# Patient Record
Sex: FEMALE | Race: WHITE | NOT HISPANIC OR LATINO | Employment: UNEMPLOYED | URBAN - METROPOLITAN AREA
[De-identification: names, ages, dates, MRNs, and addresses within clinical notes are randomized per-mention and may not be internally consistent; named-entity substitution may affect disease eponyms.]

---

## 2017-01-03 DIAGNOSIS — M79.673 PAIN OF FOOT, UNSPECIFIED LATERALITY: Primary | ICD-10-CM

## 2017-01-05 ENCOUNTER — OFFICE VISIT (OUTPATIENT)
Dept: ORTHOPEDICS | Facility: CLINIC | Age: 62
End: 2017-01-05
Payer: COMMERCIAL

## 2017-01-05 ENCOUNTER — HOSPITAL ENCOUNTER (OUTPATIENT)
Dept: RADIOLOGY | Facility: HOSPITAL | Age: 62
Discharge: HOME | End: 2017-01-05
Attending: ORTHOPAEDIC SURGERY
Payer: COMMERCIAL

## 2017-01-05 VITALS
WEIGHT: 164 LBS | SYSTOLIC BLOOD PRESSURE: 152 MMHG | BODY MASS INDEX: 30.18 KG/M2 | HEIGHT: 62 IN | HEART RATE: 79 BPM | DIASTOLIC BLOOD PRESSURE: 73 MMHG

## 2017-01-05 DIAGNOSIS — M20.11 HALLUX VALGUS, RIGHT: Primary | ICD-10-CM

## 2017-01-05 DIAGNOSIS — M79.673 PAIN OF FOOT, UNSPECIFIED LATERALITY: ICD-10-CM

## 2017-01-05 PROCEDURE — 99999 PR PBB SHADOW E&M-EST. PATIENT-LVL III: CPT | Mod: PBBFAC,,, | Performed by: ORTHOPAEDIC SURGERY

## 2017-01-05 PROCEDURE — 99214 OFFICE O/P EST MOD 30 MIN: CPT | Mod: S$GLB,,, | Performed by: ORTHOPAEDIC SURGERY

## 2017-01-05 PROCEDURE — 1159F MED LIST DOCD IN RCRD: CPT | Mod: S$GLB,,, | Performed by: ORTHOPAEDIC SURGERY

## 2017-01-05 PROCEDURE — 73630 X-RAY EXAM OF FOOT: CPT | Mod: 26,RT,, | Performed by: RADIOLOGY

## 2017-01-05 PROCEDURE — 73630 X-RAY EXAM OF FOOT: CPT | Mod: TC,PN,RT

## 2017-01-06 ENCOUNTER — PATIENT MESSAGE (OUTPATIENT)
Dept: FAMILY MEDICINE | Facility: CLINIC | Age: 62
End: 2017-01-06

## 2017-01-06 RX ORDER — PHENTERMINE HYDROCHLORIDE 37.5 MG/1
1 TABLET ORAL DAILY
COMMUNITY
Start: 2017-01-02 | End: 2019-06-14 | Stop reason: ALTCHOICE

## 2017-01-06 NOTE — PROGRESS NOTES
Past Medical History   Diagnosis Date    Allergy     Arthritis     GERD (gastroesophageal reflux disease)     Ulcer        Past Surgical History   Procedure Laterality Date    Hysterectomy      Knee surgery      Tonsillectomy, adenoidectomy, bilateral myringotomy and tubes      Cholecystectomy      Eye surgery      Tubal ligation         Current Outpatient Prescriptions   Medication Sig    ondansetron (ZOFRAN-ODT) 4 MG TbDL Take 1 tablet (4 mg total) by mouth every 8 (eight) hours as needed.     Current Facility-Administered Medications   Medication    dexamethasone (DECADRON) injection 4 mg    triamcinolone acetonide (KENALOG-40) injection 40 mg       Review of patient's allergies indicates:   Allergen Reactions    Amoxicillin Nausea Only     Cannot tolerate side effects    Erythromycin Nausea Only     Other reaction(s): Nausea    Oxycodone      Other reaction(s): Swelling  Other reaction(s): Flushing (skin)    Phenergan [promethazine] Other (See Comments)     Other reaction(s): Muscle pain  Muscle pain    Imitrex [sumatriptan succinate] Swelling and Palpitations     Other reaction(s): swelling  Other reaction(s): chest pains       History reviewed. No pertinent family history.    Social History     Social History    Marital status:      Spouse name: N/A    Number of children: N/A    Years of education: N/A     Occupational History    Not on file.     Social History Main Topics    Smoking status: Never Smoker    Smokeless tobacco: Never Used    Alcohol use No    Drug use: No    Sexual activity: Not on file     Other Topics Concern    Not on file     Social History Narrative       Chief Complaint:   Chief Complaint   Patient presents with    Foot Pain     right       History of present illness: Is a 61-year-old female patient of mine who comes in with a new complaint of right foot pain.  Patient denies injury or trauma.  She's been having pain around the great toe now for a few  weeks.  Does admit to being on her feet a lot recently.  Unfortunately, her mother recently passed away and she was standing up in the ICU for several weeks.  Pain as a 1 out of 10.  Pain when walking.  Pain on top of her foot and around the MTP joint of the great toe.  No previous history of foot pain.  No previous treatment.      Answers for HPI/ROS submitted by the patient on 1/2/2017   Leg pain  unexpected weight change: No  appetite change : No  sleep disturbance: No  IMMUNOCOMPROMISED: No  nervous/ anxious: No  dysphoric mood: No  rash: No  visual disturbance: No  eye redness: No  eye pain: No  ear pain: No  tinnitus: No  hearing loss: No  sinus pressure : No  nosebleeds: No  enviro allergies: Yes  food allergies: No  cough: No  shortness of breath: No  sweating: No  dysuria: No  frequency: No  difficulty urinating: No  hematuria: No  painful intercourse: No  chest pain: No  palpitations: No  nausea: No  vomiting: No  diarrhea: No  blood in stool: No  constipation: No  headaches: No  dizziness: No  numbness: Yes  seizures: No  joint swelling: No  myalgia: No  weakness: No  back pain: No  Pain Chronicity: recurrent  History of trauma: No  Onset: 1 to 4 weeks ago  Frequency: daily  Progression since onset: gradually worsening  Injury mechanism: jumping  injury location: at home  pain- numeric: 3/10  pain location: right foot  pain quality: sharp, burning  Radiating Pain: No  Aggravating factors: walking  fever: No  inability to bear weight: Yes  itching: No  joint locking: No  limited range of motion: No  stiffness: No  tingling: No  Treatments tried: cold, heat, exercise, NSAIDs, rest, other  physical therapy: ineffective  Improvement on treatment: no relief        Physical Examination:    Vital Signs:    Vitals:    01/05/17 1005   BP: (!) 152/73   Pulse: 79       Body mass index is 30 kg/(m^2).    This a well-developed, well nourished patient in no acute distress.  They are alert and oriented and cooperative  to examination.  Pt. walks without an antalgic gait.      Examination of the patient's right foot and ankle shows no signs of rashes or erythema. The patient has no ecchymosis or effusion or masses. The patient has a negative anterior drawer and talar tilting exam. The patient has full range of motion of ankle dorsiflexion, plantarflexion, inversion, and eversion. Patient has 5 out of 5 motor strength in all muscle groups. Patient has 2+ dorsalis pedis pulses and intact light touch sensation. The patient is nontender over both medial ankle ligaments and medial malleolus as well as lateral ankle ligaments and the lateral malleolus.  Patient does have pain of the MTP joint.  Mild bunion deformity.    Examination of the patient's left foot and ankle shows no signs of rashes or erythema. The patient has no ecchymosis or effusion or masses. The patient has a negative anterior drawer and talar tilting exam. The patient has full range of motion of ankle dorsiflexion, plantarflexion, inversion, and eversion. Patient has 5 out of 5 motor strength in all muscle groups. Patient has 2+ dorsalis pedis pulses and intact light touch sensation. The patient is nontender over both medial ankle ligaments and medial malleolus as well as lateral ankle ligaments and the lateral malleolus. Negative squeeze test.        X-rays: 3 views of the right foot are ordered and reviewed which show mild hallux valgus formation.  Achilles bone spur.     Assessment:: Right hallux valgus    Plan:  I reviewed the findings and the x-rays with her today.  We discussed treatment options.  Patient has a very mild hallux valgus to just recently became symptomatic.  I recommended some inserts for her shoes including cushioning under the first MTP joint particularly.  Follow-up as needed.    This note was created using Dragon voice recognition software that occasionally misinterpreted phrases or words.    Consult note is delivered via Epic messaging  service.

## 2017-01-10 ENCOUNTER — PATIENT MESSAGE (OUTPATIENT)
Dept: ORTHOPEDICS | Facility: CLINIC | Age: 62
End: 2017-01-10

## 2017-01-12 ENCOUNTER — OFFICE VISIT (OUTPATIENT)
Dept: ORTHOPEDICS | Facility: CLINIC | Age: 62
End: 2017-01-12
Payer: COMMERCIAL

## 2017-01-12 ENCOUNTER — HOSPITAL ENCOUNTER (OUTPATIENT)
Dept: RADIOLOGY | Facility: HOSPITAL | Age: 62
Discharge: HOME | End: 2017-01-12
Attending: ORTHOPAEDIC SURGERY
Payer: COMMERCIAL

## 2017-01-12 VITALS
SYSTOLIC BLOOD PRESSURE: 142 MMHG | HEIGHT: 62 IN | BODY MASS INDEX: 30.18 KG/M2 | DIASTOLIC BLOOD PRESSURE: 73 MMHG | WEIGHT: 164 LBS | HEART RATE: 81 BPM

## 2017-01-12 DIAGNOSIS — S93.602A FOOT SPRAIN, LEFT, INITIAL ENCOUNTER: Primary | ICD-10-CM

## 2017-01-12 DIAGNOSIS — M79.672 LEFT FOOT PAIN: Primary | ICD-10-CM

## 2017-01-12 DIAGNOSIS — M79.672 LEFT FOOT PAIN: ICD-10-CM

## 2017-01-12 PROCEDURE — 73630 X-RAY EXAM OF FOOT: CPT | Mod: 26,LT,, | Performed by: RADIOLOGY

## 2017-01-12 PROCEDURE — 99999 PR PBB SHADOW E&M-EST. PATIENT-LVL III: CPT | Mod: PBBFAC,,, | Performed by: ORTHOPAEDIC SURGERY

## 2017-01-12 PROCEDURE — 1159F MED LIST DOCD IN RCRD: CPT | Mod: S$GLB,,, | Performed by: ORTHOPAEDIC SURGERY

## 2017-01-12 PROCEDURE — 73630 X-RAY EXAM OF FOOT: CPT | Mod: TC,PN,LT

## 2017-01-12 PROCEDURE — 99214 OFFICE O/P EST MOD 30 MIN: CPT | Mod: S$GLB,,, | Performed by: ORTHOPAEDIC SURGERY

## 2017-01-12 NOTE — PROGRESS NOTES
Past Medical History   Diagnosis Date    Allergy     Arthritis     GERD (gastroesophageal reflux disease)     Ulcer        Past Surgical History   Procedure Laterality Date    Hysterectomy      Knee surgery      Tonsillectomy, adenoidectomy, bilateral myringotomy and tubes      Cholecystectomy      Eye surgery      Tubal ligation         Current Outpatient Prescriptions   Medication Sig    ondansetron (ZOFRAN-ODT) 4 MG TbDL Take 1 tablet (4 mg total) by mouth every 8 (eight) hours as needed.    phentermine (ADIPEX-P) 37.5 mg tablet Take 1 tablet by mouth once daily.     Current Facility-Administered Medications   Medication    dexamethasone (DECADRON) injection 4 mg    triamcinolone acetonide (KENALOG-40) injection 40 mg       Review of patient's allergies indicates:   Allergen Reactions    Amoxicillin Nausea Only     Cannot tolerate side effects    Erythromycin Nausea Only     Other reaction(s): Nausea    Oxycodone      Other reaction(s): Swelling  Other reaction(s): Flushing (skin)    Phenergan [promethazine] Other (See Comments)     Other reaction(s): Muscle pain  Muscle pain    Imitrex [sumatriptan succinate] Swelling and Palpitations     Other reaction(s): swelling  Other reaction(s): chest pains       History reviewed. No pertinent family history.    Social History     Social History    Marital status:      Spouse name: N/A    Number of children: N/A    Years of education: N/A     Occupational History    Not on file.     Social History Main Topics    Smoking status: Never Smoker    Smokeless tobacco: Never Used    Alcohol use No    Drug use: No    Sexual activity: Not on file     Other Topics Concern    Not on file     Social History Narrative       Chief Complaint:   Chief Complaint   Patient presents with    Foot Pain     left foot pain        History of present illness: Is a 61-year-old female patient of mine who comes in with a new complaint of left foot pain.  Patient  denies injury or trauma.  Patient stepped in a hole on January 9, 2017.  She now has pain over the dorsal lateral midfoot.  Pain with walking and standing.  Pain of 6 out of 10.    Physical Examination:    Vital Signs:    Vitals:    01/12/17 0822   BP: (!) 142/73   Pulse: 81       Body mass index is 30 kg/(m^2).    This a well-developed, well nourished patient in no acute distress.  They are alert and oriented and cooperative to examination.  Pt. walks without an antalgic gait.        Examination of the patient's left foot and ankle shows no signs of rashes or erythema. The patient has no ecchymosis or effusion or masses. The patient has a negative anterior drawer and talar tilting exam.  Tender to palpation over the dorsal midfoot in the area of the calcaneocuboid junction The patient has full range of motion of ankle dorsiflexion, plantarflexion, inversion, and eversion. Patient has 5 out of 5 motor strength in all muscle groups. Patient has 2+ dorsalis pedis pulses and intact light touch sensation. The patient is nontender over both medial ankle ligaments and medial malleolus as well  lateral ankle ligaments and the lateral malleolus. Negative squeeze test.        X-rays: 3 views of the left foot are ordered and reviewed which show no acute fracture     Assessment:: Left foot sprain    Plan:  I reviewed the findings and the x-rays with her today.  We discussed treatment options.  I recommended a postop shoe.  She may wean out of this as her pain improves.    This note was created using Dragon voice recognition software that occasionally misinterpreted phrases or words.    Consult note is delivered via Epic messaging service.    Answers for HPI/ROS submitted by the patient on 1/10/2017   Leg pain  unexpected weight change: No  appetite change : No  sleep disturbance: No  IMMUNOCOMPROMISED: No  nervous/ anxious: No  dysphoric mood: No  rash: No  visual disturbance: No  eye redness: No  eye pain: No  ear pain:  No  tinnitus: No  hearing loss: No  sinus pressure : No  nosebleeds: No  enviro allergies: Yes  food allergies: No  cough: No  shortness of breath: No  sweating: No  dysuria: No  frequency: No  difficulty urinating: No  hematuria: No  painful intercourse: No  chest pain: No  palpitations: No  nausea: No  vomiting: No  diarrhea: No  blood in stool: No  constipation: No  headaches: No  dizziness: No  numbness: No  seizures: No  joint swelling: No  myalgia: Yes  weakness: No  back pain: No  Pain Chronicity: new  History of trauma: No  Onset: yesterday  Frequency: constantly  Progression since onset: gradually worsening  Injury mechanism: twisting  injury location: at home  pain- numeric: 8/10  pain location: left foot  pain quality: aching, sharp  Radiating Pain: No  Aggravating factors: bearing weight, extension, standing, flexion, walking, rotation, touching  fever: No  inability to bear weight: Yes  itching: No  joint locking: No  limited range of motion: Yes  stiffness: No  tingling: No  Treatments tried: cold, heat, OTC pain meds, rest  physical therapy: ineffective  Improvement on treatment: no relief

## 2017-05-11 ENCOUNTER — PATIENT MESSAGE (OUTPATIENT)
Dept: ORTHOPEDICS | Facility: CLINIC | Age: 62
End: 2017-05-11

## 2017-05-12 DIAGNOSIS — M25.561 RIGHT KNEE PAIN, UNSPECIFIED CHRONICITY: Primary | ICD-10-CM

## 2017-05-15 ENCOUNTER — OFFICE VISIT (OUTPATIENT)
Dept: ORTHOPEDICS | Facility: CLINIC | Age: 62
End: 2017-05-15
Payer: COMMERCIAL

## 2017-05-15 ENCOUNTER — HOSPITAL ENCOUNTER (OUTPATIENT)
Dept: RADIOLOGY | Facility: HOSPITAL | Age: 62
Discharge: HOME | End: 2017-05-15
Attending: ORTHOPAEDIC SURGERY
Payer: COMMERCIAL

## 2017-05-15 VITALS
HEIGHT: 62 IN | BODY MASS INDEX: 30.18 KG/M2 | DIASTOLIC BLOOD PRESSURE: 71 MMHG | SYSTOLIC BLOOD PRESSURE: 142 MMHG | WEIGHT: 164 LBS | HEART RATE: 77 BPM

## 2017-05-15 DIAGNOSIS — S83.241A ACUTE MEDIAL MENISCAL TEAR, RIGHT, INITIAL ENCOUNTER: Primary | ICD-10-CM

## 2017-05-15 DIAGNOSIS — M25.561 RIGHT KNEE PAIN, UNSPECIFIED CHRONICITY: ICD-10-CM

## 2017-05-15 PROCEDURE — 99999 PR PBB SHADOW E&M-EST. PATIENT-LVL III: CPT | Mod: PBBFAC,,, | Performed by: ORTHOPAEDIC SURGERY

## 2017-05-15 PROCEDURE — 73564 X-RAY EXAM KNEE 4 OR MORE: CPT | Mod: TC,PN,RT

## 2017-05-15 PROCEDURE — 73564 X-RAY EXAM KNEE 4 OR MORE: CPT | Mod: 26,RT,, | Performed by: RADIOLOGY

## 2017-05-15 PROCEDURE — 99214 OFFICE O/P EST MOD 30 MIN: CPT | Mod: S$GLB,,, | Performed by: ORTHOPAEDIC SURGERY

## 2017-05-15 PROCEDURE — 1160F RVW MEDS BY RX/DR IN RCRD: CPT | Mod: S$GLB,,, | Performed by: ORTHOPAEDIC SURGERY

## 2017-05-15 PROCEDURE — 73562 X-RAY EXAM OF KNEE 3: CPT | Mod: 26,LT,, | Performed by: RADIOLOGY

## 2017-05-15 NOTE — PROGRESS NOTES
Past Medical History:   Diagnosis Date    Allergy     Arthritis     GERD (gastroesophageal reflux disease)     Ulcer        Past Surgical History:   Procedure Laterality Date    CHOLECYSTECTOMY      EYE SURGERY      HYSTERECTOMY      KNEE SURGERY      TONSILLECTOMY, ADENOIDECTOMY, BILATERAL MYRINGOTOMY AND TUBES      TUBAL LIGATION         Current Outpatient Prescriptions   Medication Sig    ondansetron (ZOFRAN-ODT) 4 MG TbDL Take 1 tablet (4 mg total) by mouth every 8 (eight) hours as needed.    phentermine (ADIPEX-P) 37.5 mg tablet Take 1 tablet by mouth once daily.     Current Facility-Administered Medications   Medication    dexamethasone (DECADRON) injection 4 mg    triamcinolone acetonide (KENALOG-40) injection 40 mg       Review of patient's allergies indicates:   Allergen Reactions    Amoxicillin Nausea Only     Cannot tolerate side effects    Erythromycin Nausea Only     Other reaction(s): Nausea    Oxycodone      Other reaction(s): Swelling  Other reaction(s): Flushing (skin)    Phenergan [promethazine] Other (See Comments)     Other reaction(s): Muscle pain  Muscle pain    Imitrex [sumatriptan succinate] Swelling and Palpitations     Other reaction(s): swelling  Other reaction(s): chest pains       History reviewed. No pertinent family history.    Social History     Social History    Marital status:      Spouse name: N/A    Number of children: N/A    Years of education: N/A     Occupational History    Not on file.     Social History Main Topics    Smoking status: Never Smoker    Smokeless tobacco: Never Used    Alcohol use No    Drug use: No    Sexual activity: Not on file     Other Topics Concern    Not on file     Social History Narrative       Chief Complaint:   Chief Complaint   Patient presents with    Right Knee - Pain       History of present illness: This is a 61-year-old patient of mine who comes with a new complaint of right knee pain.  Pain started in early  May.  There is no injury or trauma.  Pain with walking.  Pain is in the front and medial aspect of her knee.  Her knee completely liliana at times.  She feels popping and catching along the posterior medial knee.  She does have a history of meniscal tear on the left knee that required arthroscopic surgery several years prior.  Pain today as a 0 out of 10 but up to an 8 out of 10 after her giving way incidents.    Answers for HPI/ROS submitted by the patient on 5/12/2017   Leg pain  unexpected weight change: No  appetite change : No  sleep disturbance: No  IMMUNOCOMPROMISED: No  nervous/ anxious: No  dysphoric mood: No  rash: No  visual disturbance: No  eye redness: No  eye pain: No  ear pain: No  tinnitus: No  hearing loss: No  sinus pressure : No  nosebleeds: No  enviro allergies: No  food allergies: No  cough: No  shortness of breath: No  sweating: No  dysuria: No  frequency: No  difficulty urinating: No  hematuria: No  painful intercourse: No  chest pain: No  palpitations: No  nausea: No  vomiting: No  diarrhea: No  blood in stool: No  constipation: No  headaches: No  dizziness: No  numbness: No  seizures: No  joint swelling: No  myalgia: No  weakness: No  back pain: No  Pain Chronicity: new  History of trauma: No  Onset: 1 to 4 weeks ago  Frequency: daily  Progression since onset: gradually worsening  injury location: at home  pain- numeric: 8/10  pain location: right knee  pain quality: sharp, tight  Radiating Pain: No  Aggravating factors: walking  fever: No  inability to bear weight: Yes  itching: No  joint locking: No  limited range of motion: No  stiffness: No  tingling: No  Treatments tried: brace/corset, cold, heat, exercise, movement, rest  physical therapy: ineffective  Improvement on treatment: no relief        Physical Examination:    Vital Signs:    Vitals:    05/15/17 0845   BP: (!) 142/71   Pulse: 77       Body mass index is 30 kg/(m^2).    This a well-developed, well nourished patient in no acute  distress.  They are alert and oriented and cooperative to examination.  Pt. walks without an antalgic gait.      Examination of the right knee shows no rashes or erythema. There are no masses ecchymosis or effusion. Patient has full range of motion from 0-130°. Patient is nontender to palpation over lateral joint line and moderately tender to palpation over the medial joint line. Patient has a - Lachman exam, - anterior drawer exam, and - posterior drawer exam.  Positive medial Apley exam. Knee is stable to varus and valgus stress. 5 out of 5 motor strength. Palpable distal pulses. Intact light touch sensation. Negative Patellofemoral crepitus    Examination of the left knee shows no rashes or erythema. There are no masses ecchymosis or effusion. Patient has full range of motion from 0-130°. Patient is nontender to palpation over lateral joint line and nontender to palpation over the medial joint line. Patient has a - Lachman exam, - anterior drawer exam, and - posterior drawer exam. - Davian's exam. Knee is stable to varus and valgus stress. 5 out of 5 motor strength. Palpable distal pulses. Intact light touch sensation. Negative Patellofemoral crepitus    X-rays: X-rays of the right knee are ordered and reviewed which show very minimal medial joint space narrowing     Assessment:: Right medial meniscal tear    Plan:  I recommended an MRI to evaluate for possible bucket-handle tear of the medial meniscus.  Patient has joint line symptoms as well as mechanical symptoms and giving way of her knee.  Follow-up after the MRI is completed.    This note was created using Dragon voice recognition software that occasionally misinterpreted phrases or words.    Consult note is delivered via Epic messaging service.

## 2017-05-16 ENCOUNTER — HOSPITAL ENCOUNTER (OUTPATIENT)
Dept: RADIOLOGY | Facility: HOSPITAL | Age: 62
Discharge: HOME | End: 2017-05-16
Attending: ORTHOPAEDIC SURGERY
Payer: COMMERCIAL

## 2017-05-16 DIAGNOSIS — S83.241A ACUTE MEDIAL MENISCAL TEAR, RIGHT, INITIAL ENCOUNTER: ICD-10-CM

## 2017-05-16 PROCEDURE — 73721 MRI JNT OF LWR EXTRE W/O DYE: CPT | Mod: TC,RT

## 2017-05-16 PROCEDURE — 73721 MRI JNT OF LWR EXTRE W/O DYE: CPT | Mod: 26,RT,, | Performed by: RADIOLOGY

## 2017-05-22 ENCOUNTER — OFFICE VISIT (OUTPATIENT)
Dept: ORTHOPEDICS | Facility: CLINIC | Age: 62
End: 2017-05-22
Payer: COMMERCIAL

## 2017-05-22 VITALS
DIASTOLIC BLOOD PRESSURE: 80 MMHG | HEIGHT: 62 IN | WEIGHT: 149 LBS | BODY MASS INDEX: 27.42 KG/M2 | HEART RATE: 81 BPM | SYSTOLIC BLOOD PRESSURE: 143 MMHG

## 2017-05-22 DIAGNOSIS — M23.91 CHONDRAL DEFECT OF RIGHT TIBIAL PLATEAU: Primary | ICD-10-CM

## 2017-05-22 PROCEDURE — 99999 PR PBB SHADOW E&M-EST. PATIENT-LVL III: CPT | Mod: PBBFAC,,, | Performed by: ORTHOPAEDIC SURGERY

## 2017-05-22 PROCEDURE — 1160F RVW MEDS BY RX/DR IN RCRD: CPT | Mod: S$GLB,,, | Performed by: ORTHOPAEDIC SURGERY

## 2017-05-22 PROCEDURE — 99213 OFFICE O/P EST LOW 20 MIN: CPT | Mod: S$GLB,,, | Performed by: ORTHOPAEDIC SURGERY

## 2017-05-22 NOTE — PROGRESS NOTES
Past Medical History:   Diagnosis Date    Allergy     Arthritis     GERD (gastroesophageal reflux disease)     Ulcer        Past Surgical History:   Procedure Laterality Date    CHOLECYSTECTOMY      EYE SURGERY      HYSTERECTOMY      KNEE SURGERY      TONSILLECTOMY, ADENOIDECTOMY, BILATERAL MYRINGOTOMY AND TUBES      TUBAL LIGATION         Current Outpatient Prescriptions   Medication Sig    ondansetron (ZOFRAN-ODT) 4 MG TbDL Take 1 tablet (4 mg total) by mouth every 8 (eight) hours as needed.    phentermine (ADIPEX-P) 37.5 mg tablet Take 1 tablet by mouth once daily.     Current Facility-Administered Medications   Medication    dexamethasone (DECADRON) injection 4 mg    triamcinolone acetonide (KENALOG-40) injection 40 mg       Review of patient's allergies indicates:   Allergen Reactions    Amoxicillin Nausea Only     Cannot tolerate side effects    Erythromycin Nausea Only     Other reaction(s): Nausea    Oxycodone      Other reaction(s): Swelling  Other reaction(s): Flushing (skin)    Phenergan [promethazine] Other (See Comments)     Other reaction(s): Muscle pain  Muscle pain    Imitrex [sumatriptan succinate] Swelling and Palpitations     Other reaction(s): swelling  Other reaction(s): chest pains       History reviewed. No pertinent family history.    Social History     Social History    Marital status:      Spouse name: N/A    Number of children: N/A    Years of education: N/A     Occupational History    Not on file.     Social History Main Topics    Smoking status: Never Smoker    Smokeless tobacco: Never Used    Alcohol use No    Drug use: No    Sexual activity: Not on file     Other Topics Concern    Not on file     Social History Narrative    No narrative on file       Chief Complaint:   No chief complaint on file.      History of present illness: This is a 61-year-old patient of mine who comes with a new complaint of right knee pain.  Pain started in early May.   There is no injury or trauma.  Pain with walking.  Pain is in the front and medial aspect of her knee.  Her knee completely liliana at times.  She feels popping and catching along the posterior medial knee.  She does have a history of meniscal tear on the left knee that required arthroscopic surgery several years prior.  Pain today as a 0 out of 10 but up to an 8 out of 10 after her giving way incidents.  MRI did show chondral defect on the medial surface of the tibia with some corresponding edema.    Answers for HPI/ROS submitted by the patient on 5/19/2017   Leg pain  unexpected weight change: No  appetite change : No  sleep disturbance: No  IMMUNOCOMPROMISED: No  nervous/ anxious: No  dysphoric mood: No  rash: No  visual disturbance: No  eye redness: No  eye pain: No  ear pain: No  tinnitus: No  hearing loss: No  sinus pressure : No  nosebleeds: No  enviro allergies: No  food allergies: No  cough: No  shortness of breath: No  sweating: No  dysuria: No  frequency: No  difficulty urinating: No  hematuria: No  painful intercourse: No  chest pain: No  palpitations: No  nausea: No  vomiting: No  diarrhea: No  blood in stool: No  constipation: No  headaches: No  dizziness: No  numbness: No  seizures: No  joint swelling: No  myalgia: No  weakness: No  back pain: No  Pain Chronicity: new  History of trauma: No  Onset: 1 to 4 weeks ago  Frequency: intermittently  Progression since onset: gradually improving  injury location: at home  pain- numeric: 0/10  pain location: right knee  pain quality: sharp, tight  Radiating Pain: No  Aggravating factors: walking  fever: No  inability to bear weight: Yes  itching: No  joint locking: No  limited range of motion: No  stiffness: No  tingling: No  Treatments tried: brace/corset, cold, heat, exercise, movement, NSAIDs, OTC pain meds, rest  physical therapy: ineffective  Improvement on treatment: no relief        Physical Examination:    Vital Signs:    There were no vitals filed for  this visit.    There is no height or weight on file to calculate BMI.    This a well-developed, well nourished patient in no acute distress.  They are alert and oriented and cooperative to examination.  Pt. walks without an antalgic gait.      Examination of the right knee shows no rashes or erythema. There are no masses ecchymosis or effusion. Patient has full range of motion from 0-130°. Patient is nontender to palpation over lateral joint line and moderately tender to palpation over the medial joint line.Knee is stable to varus and valgus stress. 5 out of 5 motor strength. Palpable distal pulses. Intact light touch sensation. Negative Patellofemoral crepitus      X-rays: X-rays of the right knee are reviewed which show very minimal medial joint space narrowing    MRI of the right knee:1.  Tricompartmental osteoarthritis involving the right knee which is most pronounced in the medial compartment and also mild to moderate in the medial patellar facet. There is an area of full-thickness weightbearing medial tibial cartilage loss with underlying marrow edema. Meniscal degeneration and mild meniscal extrusion is noted without evidence for meniscal tear.     Assessment:: Right medial chondral defect    Plan:  I   reviewed the MRI with her today.  We talked about treatment options.  I recommended hyaluronic acid injections but she declined.  Patient will follow up as needed.    This note was created using Dragon voice recognition software that occasionally misinterpreted phrases or words.    Consult note is delivered via Epic messaging service.

## 2018-05-01 ENCOUNTER — OFFICE VISIT (OUTPATIENT)
Dept: OPHTHALMOLOGY | Facility: CLINIC | Age: 63
End: 2018-05-01
Payer: COMMERCIAL

## 2018-05-01 DIAGNOSIS — H25.13 NUCLEAR SCLEROSIS OF BOTH EYES: ICD-10-CM

## 2018-05-01 DIAGNOSIS — Z98.890 HX OF LASIK: ICD-10-CM

## 2018-05-01 DIAGNOSIS — H53.19 FLOATERS WITH PHOTOPSIA: Primary | ICD-10-CM

## 2018-05-01 DIAGNOSIS — H43.399 FLOATERS WITH PHOTOPSIA: Primary | ICD-10-CM

## 2018-05-01 PROCEDURE — 92002 INTRM OPH EXAM NEW PATIENT: CPT | Mod: S$GLB,,, | Performed by: OPHTHALMOLOGY

## 2018-05-01 PROCEDURE — 99999 PR PBB SHADOW E&M-EST. PATIENT-LVL II: CPT | Mod: PBBFAC,,, | Performed by: OPHTHALMOLOGY

## 2018-05-01 NOTE — PROGRESS NOTES
HPI     Eye Problem    Additional comments: Flashes and floaters with light sensitivity           Comments   DLE: x 1-2 yrs    Pt states x 1-2 days ago had a couple episodes of blurred vision but did   not last. Yesterday around 2pm she started seeing black squiggly things in   va then a little while later stared seeing sudden crescent shape flashes   of light. + very light sensitive but no pain. Seems to see flashes when   she moves her eye. + had lasik OU in 2000.       Last edited by Cheryle Quintana on 5/1/2018  8:08 AM. (History)        ROS     Positive for: Eyes (LASIK OU 2000)    Negative for: Endocrine (denies DM), Cardiovascular (denies HTN),   Heme/Lymph (denies ASA)    Last edited by Caroline Awan MD on 5/1/2018  9:03 AM.   (History)        Assessment /Plan     For exam results, see Encounter Report.    Floaters with photopsia    Nuclear sclerosis of both eyes    Hx of LASIK          No hole/tear noted on scleral depression. RD precautions reviewed. RTC 2 weeks, DFE OU, sooner if symptoms worsen.    Not visually significant.  Observe.    Monovision OS

## 2018-05-01 NOTE — PATIENT INSTRUCTIONS
What Are Flashes and Floaters?  Have you ever seen flashes of light, stars, or streaks that arent really there? A few of these flashes are seen by everyone from time to time. Usually you see them in one eye at a time. Flashes are often caused by the gel filling inside of your eye, called the vitreous, pulling on the retina. The retina is a membrane that lines the inside of your eye.  Floaters look like dark specks, clouds, threads, or spider webs moving through your eyesight. Most people see them once in a while. Floaters may be pieces of gel or other material floating inside your eye. They are usually harmless.      Who Gets Flashes?  As you age or if you are nearsighted, you are more likely to see flashes. Nearsightedness is when you have fuzzy distance vision. Sometimes, flashes are a sign of other eye problems that need care.  Who Gets Floaters?  The older you get, the more likely youll notice floaters. Floaters can also be caused by an eye injury or surgery. People who are very nearsighted may get more floaters. If floaters appear suddenly or greatly increase in number, see your healthcare provider. This may be a sign of an eye problem.  Date Last Reviewed: 5/31/2015 © 2000-2017 [employer]. [address] [place] [address], [place] [address]. All rights reserved. This information is not intended as a substitute for professional medical care. Always follow your healthcare professional's instructions.        What Are Retinal Tears and Detachments?  The retina is the inside lining of the eye. It turns light into nerve signals. These signals are then sent to the brain where they become the images you see. The retina may be torn or detached due to aging, an eye injury, or other problems. Tears and detachments are painless. But they often cause troubling vision changes. If you have symptoms of a tear or detachment, contact your eye doctor immediately. If you cannot reach your eye doctor, go to the emergency  "room. Retinal detachment is a medical emergency.      Symptoms of a detachment  If you have a detachment, you may notice:  · A shadow or curtain across your vision.  · Signs of a tear: floaters, flashes, and blurry vision  Symptoms of a tear  If you have a tear, you may notice:  · Floaters which look like spots or threads in your vision  · Flashes which look like bursts of light in your vision  · Sudden blurry vision  Date Last Reviewed: 6/13/2015 © 2000-2017 [employer]. [address] [place] [address], [place] [address]. All rights reserved. This information is not intended as a substitute for professional medical care. Always follow your healthcare professional's instructions.      POSTERIOR VITREOUS DETACHMENT PRECAUTIONS  The eye is filled with a gel (vitreous) that supports its shape. The retina is the light-sensitive tissue at the back of your eye. It records visual images and sends them to your brain so you can see. In front of the retina, the center of the eye is filled with a jelly-like substance called the vitreous.   With age, the vitreous liquefies and begins to contract,  from the retinal tissue. When the vitreous separates it causes floaters to appear gradually. (Floaters are small dots or strings that seem to be moving across your field of vision). These floaters themselves are typically harmless, however a dilated eye exam is necessary to make sure that the separation did not lead to a retinal tear.  Sometimes, when the vitreous pulls away from the retina it can cause a tear in the retina. If this happens, you will experience a sudden onset of many floaters, which may occur with flashes of light. A retinal tear is painless, but is a serious condition. If not treated, most retinal tears will progress to retinal detachment within days or weeks. This may appear as a "curtain" or "veil" covering part of the field of view, or may just cause blurred vision. Retinal detachment is also " painless, but it causes vision loss which is permanent.   Eye surgery is necessary to treat a retinal tear and prevent it from progressing to a retinal detachment. The methods commonly used are laser surgery or freezing. They may be done as outpatient procedures. Healing takes about two weeks. No treatment is necessary for floaters. They typically go away or become less noticeable with time.    SEE YOUR EYE DOCTOR IMMEDIATELY if any of the following signs of a new retinal tear occur:   Any sudden changes in your vision  Light flashes or wavy vision  Sudden blur in your vision  Burst of new floaters appearing in your field of vision

## 2018-09-09 ENCOUNTER — HOSPITAL ENCOUNTER (EMERGENCY)
Facility: HOSPITAL | Age: 63
Discharge: HOME | End: 2018-09-09
Attending: EMERGENCY MEDICINE
Payer: COMMERCIAL

## 2018-09-09 VITALS
HEIGHT: 62 IN | OXYGEN SATURATION: 99 % | TEMPERATURE: 98 F | RESPIRATION RATE: 18 BRPM | BODY MASS INDEX: 29.44 KG/M2 | HEART RATE: 90 BPM | WEIGHT: 160 LBS | SYSTOLIC BLOOD PRESSURE: 153 MMHG | DIASTOLIC BLOOD PRESSURE: 80 MMHG

## 2018-09-09 DIAGNOSIS — R07.9 CHEST PAIN: ICD-10-CM

## 2018-09-09 DIAGNOSIS — R09.1 PLEURISY: Primary | ICD-10-CM

## 2018-09-09 DIAGNOSIS — R07.89 CHEST DISCOMFORT: ICD-10-CM

## 2018-09-09 PROCEDURE — 99284 EMERGENCY DEPT VISIT MOD MDM: CPT | Mod: 25

## 2018-09-09 PROCEDURE — 93010 ELECTROCARDIOGRAM REPORT: CPT | Mod: ,,, | Performed by: INTERNAL MEDICINE

## 2018-09-09 PROCEDURE — 63600175 PHARM REV CODE 636 W HCPCS: Performed by: NURSE PRACTITIONER

## 2018-09-09 PROCEDURE — 96374 THER/PROPH/DIAG INJ IV PUSH: CPT

## 2018-09-09 PROCEDURE — 93005 ELECTROCARDIOGRAM TRACING: CPT

## 2018-09-09 RX ORDER — PREDNISONE 20 MG/1
40 TABLET ORAL DAILY
Qty: 8 TABLET | Refills: 0 | Status: SHIPPED | OUTPATIENT
Start: 2018-09-09 | End: 2018-09-13

## 2018-09-09 RX ORDER — BENZONATATE 100 MG/1
100 CAPSULE ORAL 3 TIMES DAILY PRN
Qty: 20 CAPSULE | Refills: 0 | Status: SHIPPED | OUTPATIENT
Start: 2018-09-09 | End: 2018-09-19

## 2018-09-09 RX ORDER — KETOROLAC TROMETHAMINE 30 MG/ML
10 INJECTION, SOLUTION INTRAMUSCULAR; INTRAVENOUS
Status: COMPLETED | OUTPATIENT
Start: 2018-09-09 | End: 2018-09-09

## 2018-09-09 RX ADMIN — KETOROLAC TROMETHAMINE 10 MG: 30 INJECTION, SOLUTION INTRAMUSCULAR at 06:09

## 2018-09-09 NOTE — ED PROVIDER NOTES
"Encounter Date: 9/9/2018    SCRIBE #1 NOTE: IAlly am scribing for, and in the presence of, Jerry Guevara NP .       History     Chief Complaint   Patient presents with    Chest Pain       Time seen by provider: 5:55 PM on 09/09/2018    Abi Duran is a 63 y.o. female with a hx of GERD and PNA who presents to the ED with c/o of CP since last night. She noticed the CP last night "before bed." At 3 am (approximately 15 hours ago), the pain worsened and has been constant since. Associated sx are "achy" and non reproducible neck pain and LUE pain. She notes the CP is reproducible and worsens with movement, palpation of the chest, and deep breaths. Pt states it was painful to breathe this afternoon as well prompting her to present to the ED. She took a 81 mg Aspirin this morning. She returned from vacation from [facility] a week ago with a productive cough, sore throat, nasal congestion, and sinus facial pain. The sore throat and sinus pain has since resolved but other sx persist. She reports "feeling really bad" yesterday, sx unresolved with Tylenol. She reports a hx of walking PNA and was asymptomatic. She denies hx of CHF and MI. Allergens include Amoxicillin, Erythromycin and Oxycodone.       The history is provided by the patient.     Review of patient's allergies indicates:   Allergen Reactions    Amoxicillin Nausea Only     Cannot tolerate side effects    Erythromycin Nausea Only     Other reaction(s): Nausea    Oxycodone      Other reaction(s): Swelling  Other reaction(s): Flushing (skin)    Phenergan [promethazine] Other (See Comments)     Other reaction(s): Muscle pain  Muscle pain    Imitrex [sumatriptan succinate] Swelling and Palpitations     Other reaction(s): swelling  Other reaction(s): chest pains     Past Medical History:   Diagnosis Date    Allergy     Arthritis     GERD (gastroesophageal reflux disease)     Ulcer      Past Surgical History:   Procedure Laterality Date    " CHOLECYSTECTOMY      EYE SURGERY      HYSTERECTOMY      KNEE SURGERY      TONSILLECTOMY, ADENOIDECTOMY, BILATERAL MYRINGOTOMY AND TUBES      TUBAL LIGATION       Family History   Problem Relation Age of Onset    Heart failure Mother     Stroke Mother     Macular degeneration Mother     Hypertension Mother     Heart attack Father     Emphysema Father     Lung cancer Father     Heart attack Sister     Breast cancer Maternal Grandmother     Heart attack Maternal Grandmother     Blindness Maternal Grandmother     Cataracts Maternal Grandmother     Breast cancer Paternal Grandmother     Heart attack Paternal Grandmother     Blindness Paternal Grandmother     Cataracts Paternal Grandmother     Diabetes Paternal Grandmother     Amblyopia Neg Hx     Cancer Neg Hx     Glaucoma Neg Hx     Retinal detachment Neg Hx     Strabismus Neg Hx     Thyroid disease Neg Hx      Social History     Tobacco Use    Smoking status: Never Smoker    Smokeless tobacco: Never Used   Substance Use Topics    Alcohol use: No    Drug use: No     Review of Systems   Constitutional: Negative for chills and fever.   HENT: Positive for sinus pain (resolved) and sore throat (resolved). Negative for congestion and rhinorrhea.    Eyes: Negative for pain and redness.   Respiratory: Positive for cough and shortness of breath (painful to breathe).    Cardiovascular: Positive for chest pain. Negative for palpitations.   Gastrointestinal: Negative for abdominal pain, diarrhea and nausea.   Genitourinary: Negative for dysuria, flank pain, frequency, hematuria and urgency.   Musculoskeletal: Positive for myalgias (LUE) and neck pain. Negative for gait problem.   Skin: Negative for rash.   Neurological: Negative for dizziness, light-headedness and headaches.       Physical Exam     Initial Vitals [09/09/18 1741]   BP Pulse Resp Temp SpO2   (!) 174/88 95 18 97.9 °F (36.6 °C) 99 %      MAP       --         Physical Exam    Nursing  note and vitals reviewed.  Constitutional: She appears well-developed and well-nourished. She is not diaphoretic. She is active. She does not have a sickly appearance. No distress.   HENT:   Head: Normocephalic and atraumatic.   Eyes: Conjunctivae are normal.   Neck: Normal range of motion and full passive range of motion without pain.   Cardiovascular: Normal rate, regular rhythm and normal heart sounds. Exam reveals no gallop and no friction rub.    No murmur heard.  Pulmonary/Chest: Breath sounds normal. She has no wheezes. She has no rhonchi. She has no rales.       Anterior chest wall tenderness.    Abdominal: Soft. Bowel sounds are normal. There is no tenderness.   Musculoskeletal: Normal range of motion.   Neurological: She is alert. Gait normal.   Skin: Skin is warm and dry. Capillary refill takes less than 2 seconds.   Psychiatric: She has a normal mood and affect.         ED Course   Procedures  Labs Reviewed - No data to display       Imaging Results          X-Ray Chest PA And Lateral (In process)                  Medical Decision Making:   History:   Old Medical Records: I decided to obtain old medical records.  Clinical Tests:   Radiological Study: Ordered and Reviewed  Medical Tests: Ordered and Reviewed       APC / Resident Notes:   Abi Duran is a 63 year old female presenting to the ED with c/o anterior chest pain. Patient's pain has been consistent since 3 AM but was resolved in the ED with IV Toradol. Her pain was completely reproducible with palpation of the chest. After getting relief with Toradol, she states she coughed and the chest pain returned. Chest xray interpreted by Dr. Hernandez with no acute findings such as pneumonia, pneumothorax. EKG shows no evidence of acute ischemia. I do not suspect that this pain is cardiac in nature as her pain is reproducible with palpation of the chest/movement/coughing. I do not suspect ACS or pericarditis/endocarditis. I also do not suspect PE.  Symptoms are most consistent with pleurisy due to recent viral upper respiratory infection. Patient advised me that she is unable to take oral NSAIDs. I will therefore prescribe a short course of prednisone and cough suppressants. Patient was provided with specific return precautions and verbalized understanding. I instructed her to follow up with Dr. Perales in 1-2 days as well. Based on my clinical evaluation, I do not appreciate any immediate, emergent, or life threatening condition or etiology that warrants additional workup today and feel that the patient can be discharged with close follow up care.          Scribe Attestation:   Scribe #1: I performed the above scribed service and the documentation accurately describes the services I performed. I attest to the accuracy of the note.    I, DEMETRIO Beach, personally performed the services described in this documentation. All medical record entries made by the scribe were at my direction and in my presence.  I have reviewed the chart and agree that the record reflects my personal performance and is accurate and complete. DEMETRIO Beach.  10:34 PM 09/09/2018             Clinical Impression:     1. Pleurisy    2. Chest discomfort    3. Chest pain          Disposition:   Disposition: Discharged  Condition: Stable                        Payton Guevara NP  09/09/18 0799

## 2018-09-10 NOTE — ED NOTES
"Patient is resting in bed with  at the bedside. Patient reports "Coughing and I had a sharp pain, but it is gone now." Patient rates her pain a 1/10. No needs or questions at this time.   "

## 2018-09-10 NOTE — ED NOTES
Upon discharge, patient is AAOx4, no cardiac or respiratory complications. Follow up care and  Medications have been reviewed with patient and has been instructed to return to the ER if needed. Patient verbalized understanding and ambulated to the lobby without difficulty. JUSTINO PENA.

## 2019-06-14 ENCOUNTER — HOSPITAL ENCOUNTER (EMERGENCY)
Facility: HOSPITAL | Age: 64
Discharge: HOME | End: 2019-06-14
Attending: EMERGENCY MEDICINE
Payer: COMMERCIAL

## 2019-06-14 ENCOUNTER — NURSE TRIAGE (OUTPATIENT)
Dept: ADMINISTRATIVE | Facility: CLINIC | Age: 64
End: 2019-06-14

## 2019-06-14 VITALS
TEMPERATURE: 98 F | RESPIRATION RATE: 20 BRPM | OXYGEN SATURATION: 97 % | BODY MASS INDEX: 34.24 KG/M2 | WEIGHT: 186.06 LBS | SYSTOLIC BLOOD PRESSURE: 141 MMHG | HEART RATE: 85 BPM | DIASTOLIC BLOOD PRESSURE: 68 MMHG | HEIGHT: 62 IN

## 2019-06-14 DIAGNOSIS — R09.1 PLEURISY: ICD-10-CM

## 2019-06-14 DIAGNOSIS — J40 BRONCHITIS: Primary | ICD-10-CM

## 2019-06-14 DIAGNOSIS — R06.02 SOB (SHORTNESS OF BREATH): ICD-10-CM

## 2019-06-14 LAB
ALBUMIN SERPL BCP-MCNC: 3.9 G/DL (ref 3.5–5.2)
ALP SERPL-CCNC: 90 U/L (ref 55–135)
ALT SERPL W/O P-5'-P-CCNC: 25 U/L (ref 10–44)
ANION GAP SERPL CALC-SCNC: 10 MMOL/L (ref 8–16)
AST SERPL-CCNC: 21 U/L (ref 10–40)
BASOPHILS # BLD AUTO: 0 K/UL (ref 0–0.2)
BASOPHILS NFR BLD: 0.4 % (ref 0–1.9)
BILIRUB SERPL-MCNC: 0.7 MG/DL (ref 0.1–1)
BNP SERPL-MCNC: 50 PG/ML (ref 0–99)
BUN SERPL-MCNC: 15 MG/DL (ref 8–23)
CALCIUM SERPL-MCNC: 9.4 MG/DL (ref 8.7–10.5)
CHLORIDE SERPL-SCNC: 109 MMOL/L (ref 95–110)
CO2 SERPL-SCNC: 26 MMOL/L (ref 23–29)
CREAT SERPL-MCNC: 0.8 MG/DL (ref 0.5–1.4)
D DIMER PPP IA.FEU-MCNC: 0.42 MG/L FEU
DIFFERENTIAL METHOD: ABNORMAL
EOSINOPHIL # BLD AUTO: 0.2 K/UL (ref 0–0.5)
EOSINOPHIL NFR BLD: 2.1 % (ref 0–8)
ERYTHROCYTE [DISTWIDTH] IN BLOOD BY AUTOMATED COUNT: 13.3 % (ref 11.5–14.5)
EST. GFR  (AFRICAN AMERICAN): >60 ML/MIN/1.73 M^2
EST. GFR  (NON AFRICAN AMERICAN): >60 ML/MIN/1.73 M^2
GLUCOSE SERPL-MCNC: 108 MG/DL (ref 70–110)
HCT VFR BLD AUTO: 38.8 % (ref 37–48.5)
HGB BLD-MCNC: 13 G/DL (ref 12–16)
LACTATE SERPL-SCNC: 1.2 MMOL/L (ref 0.5–2.2)
LYMPHOCYTES # BLD AUTO: 2.3 K/UL (ref 1–4.8)
LYMPHOCYTES NFR BLD: 23.5 % (ref 18–48)
MCH RBC QN AUTO: 28.5 PG (ref 27–31)
MCHC RBC AUTO-ENTMCNC: 33.4 G/DL (ref 32–36)
MCV RBC AUTO: 85 FL (ref 82–98)
MONOCYTES # BLD AUTO: 0.6 K/UL (ref 0.3–1)
MONOCYTES NFR BLD: 6.7 % (ref 4–15)
NEUTROPHILS # BLD AUTO: 6.4 K/UL (ref 1.8–7.7)
NEUTROPHILS NFR BLD: 67.3 % (ref 38–73)
PLATELET # BLD AUTO: 324 K/UL (ref 150–350)
PMV BLD AUTO: 8.6 FL (ref 9.2–12.9)
POTASSIUM SERPL-SCNC: 3.7 MMOL/L (ref 3.5–5.1)
PROT SERPL-MCNC: 6.8 G/DL (ref 6–8.4)
RBC # BLD AUTO: 4.54 M/UL (ref 4–5.4)
SODIUM SERPL-SCNC: 145 MMOL/L (ref 136–145)
TROPONIN I SERPL DL<=0.01 NG/ML-MCNC: <0.006 NG/ML (ref 0–0.03)
WBC # BLD AUTO: 9.6 K/UL (ref 3.9–12.7)

## 2019-06-14 PROCEDURE — 83605 ASSAY OF LACTIC ACID: CPT

## 2019-06-14 PROCEDURE — 93005 ELECTROCARDIOGRAM TRACING: CPT

## 2019-06-14 PROCEDURE — 80053 COMPREHEN METABOLIC PANEL: CPT

## 2019-06-14 PROCEDURE — 96374 THER/PROPH/DIAG INJ IV PUSH: CPT

## 2019-06-14 PROCEDURE — 85379 FIBRIN DEGRADATION QUANT: CPT

## 2019-06-14 PROCEDURE — 63600175 PHARM REV CODE 636 W HCPCS: Performed by: NURSE PRACTITIONER

## 2019-06-14 PROCEDURE — 85025 COMPLETE CBC W/AUTO DIFF WBC: CPT

## 2019-06-14 PROCEDURE — 84484 ASSAY OF TROPONIN QUANT: CPT

## 2019-06-14 PROCEDURE — 36415 COLL VENOUS BLD VENIPUNCTURE: CPT

## 2019-06-14 PROCEDURE — 83880 ASSAY OF NATRIURETIC PEPTIDE: CPT

## 2019-06-14 PROCEDURE — 99285 EMERGENCY DEPT VISIT HI MDM: CPT | Mod: 25

## 2019-06-14 RX ORDER — AZITHROMYCIN 250 MG/1
TABLET, FILM COATED ORAL
Qty: 6 TABLET | Refills: 0 | Status: SHIPPED | OUTPATIENT
Start: 2019-06-14 | End: 2019-06-19

## 2019-06-14 RX ORDER — PREDNISONE 10 MG/1
TABLET ORAL DAILY
Qty: 12 TABLET | Refills: 0 | Status: SHIPPED | OUTPATIENT
Start: 2019-06-14 | End: 2019-06-24

## 2019-06-14 RX ORDER — KETOROLAC TROMETHAMINE 30 MG/ML
15 INJECTION, SOLUTION INTRAMUSCULAR; INTRAVENOUS
Status: COMPLETED | OUTPATIENT
Start: 2019-06-14 | End: 2019-06-14

## 2019-06-14 RX ADMIN — KETOROLAC TROMETHAMINE 15 MG: 30 INJECTION, SOLUTION INTRAMUSCULAR at 02:06

## 2019-06-14 NOTE — ED NOTES
"Presents to the ER with c/o SOB that started a few weeks ago after returning home from a cruise. Patient reports chest heaviness that she describes as "I feel as if I have to cough something up." Associated complaints are subjective fever, hoarseness, congestion and productive cough with green sputum. Mucous membranes are pink and moist. Skin is warm, dry and intact. Lungs are clear bilaterally, respirations are regular and unlabored. Denies cough, congestion, rhinorrhea or SOB. BS active x4, no tenderness with palpation, abd is soft and not distended. Denies any appetite or activity change. S1S2, capillary refill is < 2 seconds. Denies dysuria, difficulty urinating, frequency, numbness, tingling or weakness. JEAN VSS  Patient reports that her symptoms are similar to the last time she has pleurisy, which was one year ago.    "

## 2019-06-14 NOTE — ED PROVIDER NOTES
Encounter Date: 6/14/2019    SCRIBE #1 NOTE: I, Trevin Becker, am scribing for, and in the presence of, DEMETRIO Davidson.       History     Chief Complaint   Patient presents with    Shortness of Breath    Pleurisy     Hx of Pleurisy       Time seen by provider: 1:06 PM on 06/14/2019    Abi Duran is a 63 y.o. female with a PMHx of allergy and pleurisy who presents to the ED with the onset of chest heaviness that began yesterday and has been present since onset. The pt states that she went on a cruise two weeks ago and the sx began shortly after. Associated sx include a green productive cough, congestion, hoarse voice, SOB, and fever. She explains that her chest hurts worse on the left side when she coughs or breathes. The pt states that she had a pleurisy a year ago and this feels similar. She denies having a hx of blood clots, being on blood thinners, or any other sx at this time. Past surgical hx includes a tonsillectomy and an adenoidectomy. Pt has a high drug allergy to Amoxicillins.     The history is provided by the patient.     Review of patient's allergies indicates:   Allergen Reactions    Amoxicillin Nausea Only     Cannot tolerate side effects    Erythromycin Nausea Only     Other reaction(s): Nausea    Oxycodone      Other reaction(s): Swelling  Other reaction(s): Flushing (skin)    Phenergan [promethazine] Other (See Comments)     Other reaction(s): Muscle pain  Muscle pain    Imitrex [sumatriptan succinate] Swelling and Palpitations     Other reaction(s): swelling  Other reaction(s): chest pains     Past Medical History:   Diagnosis Date    Allergy     Arthritis     GERD (gastroesophageal reflux disease)     Ulcer      Past Surgical History:   Procedure Laterality Date    CHOLECYSTECTOMY      EYE SURGERY      HYSTERECTOMY      KNEE SURGERY      TONSILLECTOMY, ADENOIDECTOMY, BILATERAL MYRINGOTOMY AND TUBES      TUBAL LIGATION       Family History   Problem Relation Age of  Onset    Heart failure Mother     Stroke Mother     Macular degeneration Mother     Hypertension Mother     Heart attack Father     Emphysema Father     Lung cancer Father     Heart attack Sister     Breast cancer Maternal Grandmother     Heart attack Maternal Grandmother     Blindness Maternal Grandmother     Cataracts Maternal Grandmother     Breast cancer Paternal Grandmother     Heart attack Paternal Grandmother     Blindness Paternal Grandmother     Cataracts Paternal Grandmother     Diabetes Paternal Grandmother     Amblyopia Neg Hx     Cancer Neg Hx     Glaucoma Neg Hx     Retinal detachment Neg Hx     Strabismus Neg Hx     Thyroid disease Neg Hx      Social History     Tobacco Use    Smoking status: Never Smoker    Smokeless tobacco: Never Used   Substance Use Topics    Alcohol use: No    Drug use: No     Review of Systems   Constitutional: Positive for fever.   HENT: Positive for congestion and voice change. Negative for sore throat.    Respiratory: Positive for cough and shortness of breath.         Positive for chest heaviness.   Cardiovascular: Negative for chest pain.   Gastrointestinal: Negative for nausea.   Genitourinary: Negative for dysuria.   Musculoskeletal: Negative for back pain.   Skin: Negative for rash.   Neurological: Negative for weakness.   Hematological: Does not bruise/bleed easily.   All other systems reviewed and are negative.      Physical Exam     Initial Vitals [06/14/19 1249]   BP Pulse Resp Temp SpO2   (!) 143/66 94 20 98.2 °F (36.8 °C) 98 %      MAP       --         Physical Exam    Nursing note and vitals reviewed.  Constitutional: Vital signs are normal. She appears well-developed and well-nourished. She is not diaphoretic. No distress.   Hoarseness.   HENT:   Head: Normocephalic and atraumatic.   Mouth/Throat: Uvula is midline, oropharynx is clear and moist and mucous membranes are normal. No oropharyngeal exudate, posterior oropharyngeal edema or  posterior oropharyngeal erythema.   Eyes: Pupils are equal, round, and reactive to light.   Neck: Neck supple.   Cardiovascular: Normal rate, regular rhythm, normal heart sounds and intact distal pulses. Exam reveals no gallop and no friction rub.    No murmur heard.  Pulmonary/Chest: Effort normal and breath sounds normal. She has no wheezes. She has no rhonchi. She has no rales.   Abdominal: Soft. Normal appearance and bowel sounds are normal. There is no tenderness.   Neurological: She is alert and oriented to person, place, and time. She has normal strength.   Skin: Skin is warm, dry and intact.   Psychiatric: She has a normal mood and affect. Her speech is normal and behavior is normal.     I, Dr. Yañez, personally performed the services described in this documentation. All medical record entries made by the scribe were at my direction and in my presence.  I have reviewed the chart and agree that the record reflects my personal performance and is accurate and complete.5:46 PM 06/14/2019        ED Course   Procedures  Labs Reviewed   CBC W/ AUTO DIFFERENTIAL - Abnormal; Notable for the following components:       Result Value    MPV 8.6 (*)     All other components within normal limits   COMPREHENSIVE METABOLIC PANEL   LACTIC ACID, PLASMA   TROPONIN I   B-TYPE NATRIURETIC PEPTIDE   D DIMER, QUANTITATIVE          Imaging Results          X-Ray Chest PA And Lateral (Final result)  Result time 06/14/19 13:34:45    Final result by Kostas Lechuga Jr., MD (06/14/19 13:34:45)                 Impression:      No acute abnormality.      Electronically signed by: Kostas Lechuga MD  Date:    06/14/2019  Time:    13:34             Narrative:    EXAMINATION:  XR CHEST PA AND LATERAL    CLINICAL HISTORY:  Cough;    TECHNIQUE:  PA and lateral views of the chest were performed.    COMPARISON:  Chest of September 9, 2018    FINDINGS:  The lungs are clear, with normal appearance of pulmonary vasculature and no pleural  effusion or pneumothorax.    The cardiac silhouette is normal in size. The hilar and mediastinal contours are unremarkable.    Bones are intact.                                 Medical Decision Making:   History:   Old Medical Records: I decided to obtain old medical records.  Differential Diagnosis:   Bronchitis  Pneumonia  PE  Clinical Tests:   Lab Tests: Ordered and Reviewed  Radiological Study: Ordered and Reviewed  Medical Tests: Ordered and Reviewed       APC / Resident Notes:   Patient is a 63 y.o. female who presents to the ED 06/14/2019 who underwent emergent evaluation for shortness of breath and pleurisy.  Patient has been sick with upper respiratory symptoms for approximately 2 weeks with associated productive cough.  The patient does not appear septic or toxic.  Vital signs normal.  She is in no acute respiratory distress.  Bilateral breath sounds clear.  Chest x-ray without acute findings.  I do not think pneumonia.  However given length of course of illness will treat patient with azithromycin.  Patient also complains of chest pain with breathing deep similar to previous episodes of pleurisy.  Patient likely does have pleurisy she is given anti-inflammatories steroids for this pain.  EKG without acute ST or T-wave abnormalities.  Normal sinus rhythm noted.  Troponin normal.  D-dimer negative. I doubt PE. I do Not think sepsis. Based on my clinical evaluation, I do not appreciate any immediate, emergent, or life threatening condition or etiology that warrants additional workup today and feel that the patient can be discharged with close follow up care. Case discussed with Dr. Yañez who also evaluated patient and who is agreeable to plan of care. Follow up and return precautions discussed; patient verbalized understanding and is agreeable to plan of care. Patient discharged home in stable condition.             Scribe Attestation:   Scribe #1: I performed the above scribed service and the  documentation accurately describes the services I performed. I attest to the accuracy of the note.    Attending Attestation:           Physician Attestation for Scribe:  Physician Attestation Statement for Johannyibe #1: I, Mónica Bassett, reviewed documentation, as scribed by in my presence, and it is both accurate and complete.     Comments: I, CHARLIE DavidsonC, personally performed the services described in this documentation. All medical record entries made by the scribe were at my direction and in my presence.  I have reviewed the chart and agree that the record reflects my personal performance and is accurate and complete. DEMETRIO Davidson.  5:15 PM 06/14/2019             ED Course as of Jun 14 1715 Fri Jun 14, 2019   1327 BP(!): 143/66 [EF]   1327 Temp: 98.2 °F (36.8 °C) [EF]   1327 Temp src: Oral [EF]   1327 Pulse: 94 [EF]   1327 Resp: 20 [EF]   1327 SpO2: 98 % [EF]   1329 Sinus rhythm normal axis poor R-wave progression no change from prior EKG in 2018 no ST segment elevation or depression 82 beats per minute independently interpreted    [EF]   1341 X-Ray Chest PA And Lateral [EF]   1351 Healthy 63-year-old presents to the ER with 2 weeks of coughing now with some shortness of breath and sternal chest discomfort worsens with lying flat.  Patient was seen last year for pleurisy and reports that her symptoms are currently identical.    [EF]   1411 Lactate, Julito: 1.2 [EF]   1521 D-dimer is negative ruling out pulmonary embolism in this low risk patient.  Two weeks of symptoms she will be placed on azithromycin.  Patient reports that similar symptoms resolved in the past with prednisone so this will be provided.  Patient reports current symptoms consistent with prior episode of pleurisy.    [EF]      ED Course User Index  [EF] Trace Yañez MD     Clinical Impression:       ICD-10-CM ICD-9-CM   1. Bronchitis J40 490   2. SOB (shortness of breath) R06.02 786.05   3. Pleurisy R09.1 511.0         Disposition:    Disposition: Discharged  Condition: Stable                        Mónica Bassett NP  06/14/19 7142       Trace Yañze MD  06/14/19 8459

## 2019-06-14 NOTE — TELEPHONE ENCOUNTER
Pt has audible laryngitis and coughing.  6/2/19 returned from cruise and began feeling poorly, runny nose, progressed to fever, and productive cough, with green and yellow mucous.  Yesterday her left chest wall began hurting when she breaths/coughs, lays down. Her throat is also sore, and significant fatigue.   Coughing spells cause sob.  She had pleurisy last year, feels exactly like that.    Reason for Disposition   Difficulty breathing    Protocols used: COUGH-A-OH

## 2019-11-08 ENCOUNTER — HOSPITAL ENCOUNTER (OUTPATIENT)
Dept: RADIOLOGY | Facility: CLINIC | Age: 64
Discharge: HOME | End: 2019-11-08
Attending: PHYSICIAN ASSISTANT
Payer: COMMERCIAL

## 2019-11-08 ENCOUNTER — OFFICE VISIT (OUTPATIENT)
Dept: FAMILY MEDICINE | Facility: CLINIC | Age: 64
End: 2019-11-08
Payer: COMMERCIAL

## 2019-11-08 VITALS
BODY MASS INDEX: 35.83 KG/M2 | RESPIRATION RATE: 17 BRPM | OXYGEN SATURATION: 98 % | SYSTOLIC BLOOD PRESSURE: 138 MMHG | HEIGHT: 62 IN | DIASTOLIC BLOOD PRESSURE: 86 MMHG | TEMPERATURE: 98 F | HEART RATE: 79 BPM | WEIGHT: 194.69 LBS

## 2019-11-08 DIAGNOSIS — R60.0 LOCALIZED EDEMA: Primary | ICD-10-CM

## 2019-11-08 DIAGNOSIS — I73.9 PVD (PERIPHERAL VASCULAR DISEASE): ICD-10-CM

## 2019-11-08 DIAGNOSIS — M79.662 PAIN OF LEFT CALF: ICD-10-CM

## 2019-11-08 DIAGNOSIS — R60.0 LOCALIZED EDEMA: ICD-10-CM

## 2019-11-08 PROCEDURE — 93971 EXTREMITY STUDY: CPT | Mod: 26,LT,S$GLB, | Performed by: RADIOLOGY

## 2019-11-08 PROCEDURE — 99214 OFFICE O/P EST MOD 30 MIN: CPT | Mod: S$GLB,,, | Performed by: PHYSICIAN ASSISTANT

## 2019-11-08 PROCEDURE — 93971 US LOWER EXTREMITY VEINS LEFT: ICD-10-PCS | Mod: 26,LT,S$GLB, | Performed by: RADIOLOGY

## 2019-11-08 PROCEDURE — 93971 EXTREMITY STUDY: CPT | Mod: TC,PO,LT

## 2019-11-08 PROCEDURE — 99999 PR PBB SHADOW E&M-EST. PATIENT-LVL IV: CPT | Mod: PBBFAC,,, | Performed by: PHYSICIAN ASSISTANT

## 2019-11-08 PROCEDURE — 99214 OFFICE O/P EST MOD 30 MIN: CPT | Mod: PBBFAC,25,PO | Performed by: PHYSICIAN ASSISTANT

## 2019-11-08 PROCEDURE — 99214 PR OFFICE/OUTPT VISIT, EST, LEVL IV, 30-39 MIN: ICD-10-PCS | Mod: S$GLB,,, | Performed by: PHYSICIAN ASSISTANT

## 2019-11-08 PROCEDURE — 99999 PR PBB SHADOW E&M-EST. PATIENT-LVL IV: ICD-10-PCS | Mod: PBBFAC,,, | Performed by: PHYSICIAN ASSISTANT

## 2019-11-08 NOTE — PROGRESS NOTES
Subjective:       Patient ID: Abi Duran is a 64 y.o. female.    Chief Complaint: Leg Swelling (left leg )    HPI   L leg swelling x 2 wks  Problems with L leg x yrs  Has varicose veins   Review of Systems   Constitutional: Negative.  Negative for activity change, appetite change, chills, diaphoresis, fatigue, fever and unexpected weight change.   HENT: Negative.  Negative for hearing loss, rhinorrhea and trouble swallowing.    Eyes: Negative.  Negative for discharge and visual disturbance.   Respiratory: Negative.  Negative for chest tightness and wheezing.    Cardiovascular: Positive for leg swelling. Negative for chest pain and palpitations.   Gastrointestinal: Negative.  Negative for blood in stool, constipation, diarrhea and vomiting.   Endocrine: Negative.  Negative for polydipsia and polyuria.   Genitourinary: Negative.  Negative for difficulty urinating, dysuria, hematuria and menstrual problem.   Musculoskeletal: Negative.  Negative for arthralgias, joint swelling and neck pain.   Skin: Negative.  Negative for rash.   Neurological: Negative.  Negative for weakness and headaches.   Psychiatric/Behavioral: Negative.  Negative for confusion and dysphoric mood.       Objective:      Physical Exam   Constitutional: She appears well-developed and well-nourished. No distress.   HENT:   Head: Normocephalic and atraumatic.   Mouth/Throat: Oropharynx is clear and moist.   Eyes: Conjunctivae are normal. No scleral icterus.   Neck: Normal range of motion. Neck supple. No tracheal deviation present. No thyromegaly present.   Cardiovascular: Normal rate, regular rhythm, normal heart sounds and intact distal pulses. Exam reveals no gallop and no friction rub.   No murmur heard.  Pulmonary/Chest: Effort normal and breath sounds normal. No stridor. No respiratory distress. She has no wheezes. She has no rales.   Musculoskeletal: She exhibits edema and tenderness.   Mild swelling and tenderness L calf  Spider veins  and varicosities both legs  Neg Esposito's    Lymphadenopathy:     She has no cervical adenopathy.   Skin: Skin is warm and dry. No rash noted.   Vitals reviewed.      Assessment:       1. Localized edema    2. Pain of left calf    3. PVD (peripheral vascular disease)        Plan:       Abi was seen today for leg swelling.    Diagnoses and all orders for this visit:    Localized edema  -     Cancel: US Extremity Non Vascular Complete Left; Future    Pain of left calf  -     Cancel: US Extremity Non Vascular Complete Left; Future    PVD (peripheral vascular disease)  -     Cancel: US Extremity Non Vascular Complete Left; Future    ultrasound calf ordered  Support hose  Proper leg elevation

## 2022-09-01 ENCOUNTER — OFFICE VISIT (OUTPATIENT)
Dept: FAMILY MEDICINE | Facility: CLINIC | Age: 67
End: 2022-09-01
Payer: MEDICARE

## 2022-09-01 ENCOUNTER — HOSPITAL ENCOUNTER (OUTPATIENT)
Dept: RADIOLOGY | Facility: CLINIC | Age: 67
Discharge: HOME | End: 2022-09-01
Attending: NURSE PRACTITIONER
Payer: MEDICARE

## 2022-09-01 VITALS
SYSTOLIC BLOOD PRESSURE: 132 MMHG | BODY MASS INDEX: 33.79 KG/M2 | WEIGHT: 183.63 LBS | TEMPERATURE: 99 F | HEIGHT: 62 IN | OXYGEN SATURATION: 97 % | HEART RATE: 89 BPM | DIASTOLIC BLOOD PRESSURE: 66 MMHG

## 2022-09-01 DIAGNOSIS — R22.0 RIGHT FACIAL SWELLING: ICD-10-CM

## 2022-09-01 DIAGNOSIS — S01.80XA OPEN WOUND OF FACE, INITIAL ENCOUNTER: ICD-10-CM

## 2022-09-01 DIAGNOSIS — S01.401A: Primary | ICD-10-CM

## 2022-09-01 PROCEDURE — 99213 OFFICE O/P EST LOW 20 MIN: CPT | Mod: PBBFAC,PO | Performed by: NURSE PRACTITIONER

## 2022-09-01 PROCEDURE — 70220 XR SINUSES MIN 3 VIEWS: ICD-10-PCS | Mod: 26,,, | Performed by: RADIOLOGY

## 2022-09-01 PROCEDURE — 99999 PR PBB SHADOW E&M-EST. PATIENT-LVL III: ICD-10-PCS | Mod: PBBFAC,,, | Performed by: NURSE PRACTITIONER

## 2022-09-01 PROCEDURE — 99213 PR OFFICE/OUTPT VISIT, EST, LEVL III, 20-29 MIN: ICD-10-PCS | Mod: S$PBB,,, | Performed by: NURSE PRACTITIONER

## 2022-09-01 PROCEDURE — 99213 OFFICE O/P EST LOW 20 MIN: CPT | Mod: S$PBB,,, | Performed by: NURSE PRACTITIONER

## 2022-09-01 PROCEDURE — 99999 PR PBB SHADOW E&M-EST. PATIENT-LVL III: CPT | Mod: PBBFAC,,, | Performed by: NURSE PRACTITIONER

## 2022-09-01 PROCEDURE — 70220 X-RAY EXAM OF SINUSES: CPT | Mod: TC,FY,PO

## 2022-09-01 PROCEDURE — 70220 X-RAY EXAM OF SINUSES: CPT | Mod: 26,,, | Performed by: RADIOLOGY

## 2022-09-01 NOTE — PROGRESS NOTES
"Subjective:       Patient ID: Abi Duran is a 67 y.o. female.    Chief Complaint: Facial Pain (Sore on Face)    HPI    Patient reports today with c/o right cheeck sore. Started as a pimple she popped the blister with her fingers. The area Burns, sharp pain  Patient thinks it's a broken tooth from her sinus cavity.  Past Medical History:   Diagnosis Date    Allergy     Arthritis     GERD (gastroesophageal reflux disease)     Ulcer        Review of patient's allergies indicates:   Allergen Reactions    Amoxicillin Nausea Only     Cannot tolerate side effects    Erythromycin Nausea Only     Other reaction(s): Nausea    Oxycodone      Other reaction(s): Swelling  Other reaction(s): Flushing (skin)    Phenergan [promethazine] Other (See Comments)     Other reaction(s): Muscle pain  Muscle pain    Imitrex [sumatriptan succinate] Swelling and Palpitations     Other reaction(s): swelling  Other reaction(s): chest pains         Current Outpatient Medications:     BORON ORAL, Take by mouth., Disp: , Rfl:     Review of Systems   Constitutional:  Negative for unexpected weight change.   Eyes:  Negative for visual disturbance.   Respiratory:  Negative for shortness of breath.    Cardiovascular:  Negative for palpitations and leg swelling.   Gastrointestinal:  Negative for blood in stool.   Allergic/Immunologic: Negative for immunocompromised state.   Psychiatric/Behavioral:  Negative for agitation. The patient is not nervous/anxious.      Objective:      /66 (BP Location: Left arm, Patient Position: Sitting, BP Method: Small (Manual))   Pulse 89   Temp 98.6 °F (37 °C) (Oral)   Ht 5' 2" (1.575 m)   Wt 83.3 kg (183 lb 10.3 oz)   SpO2 97%   BMI 33.59 kg/m²   Physical Exam  Constitutional:       Appearance: She is well-developed.   HENT:      Head:      Jaw: Tenderness present.        Comments: Wound coverd with scab, inside      Mouth/Throat:      Dentition: No dental tenderness, dental caries or dental " abscesses.      Comments: No wound are foreign object observed inside jaw  Skin:     General: Skin is warm and dry.   Neurological:      Mental Status: She is alert and oriented to person, place, and time.   Psychiatric:         Behavior: Behavior normal.         Thought Content: Thought content normal.         Judgment: Judgment normal.       Assessment:       1. Cheek wound, right, initial encounter        Plan:       Cheek wound, right, initial encounter  -     X-Ray Sinuses Min 3 Views; Future; Expected date: 09/01/2022      Time spent with patient: 20 minutes    Patient with be reevaluated in 6 months or sooner prn    Greater than 50% of this visit was spent counseling as described in above documentation:Yes    clothing

## 2022-09-02 ENCOUNTER — TELEPHONE (OUTPATIENT)
Dept: FAMILY MEDICINE | Facility: CLINIC | Age: 67
End: 2022-09-02
Payer: COMMERCIAL

## 2022-09-02 ENCOUNTER — PATIENT MESSAGE (OUTPATIENT)
Dept: FAMILY MEDICINE | Facility: CLINIC | Age: 67
End: 2022-09-02
Payer: COMMERCIAL

## 2022-09-02 NOTE — TELEPHONE ENCOUNTER
----- Message from Jazzmine Andrews NP sent at 9/2/2022  2:23 PM CDT -----  Please inform patient that the xray shows no fractures, no foreign objects in the sinus. Please apply OTC Bactroban ointment wound. If patient has any other concerns of bone figments form her teeth-she should report this to her Dentist there are special xray that only the dentist has to evaluate this.  
Call placed to patient for notification. Patient verbalized understanding.     
I have personally seen and examined this patient.  I have fully participated in the care of this patient. I have reviewed all pertinent clinical information, including history, physical exam, plan and the Resident’s note and agree except as noted.

## 2022-09-07 ENCOUNTER — TELEPHONE (OUTPATIENT)
Dept: DERMATOLOGY | Facility: CLINIC | Age: 67
End: 2022-09-07
Payer: COMMERCIAL

## 2022-09-07 NOTE — TELEPHONE ENCOUNTER
----- Message from Devyn Matson sent at 9/7/2022 12:12 PM CDT -----  Type:  Sooner Appointment Request    Caller is requesting a sooner appointment.  Caller declined first available appointment listed below.  Caller will not accept being placed on the waitlist and is requesting a message be sent to doctor.    Name of Caller:  Patient  When is the first available appointment?  04/11/23  Symptoms:  possible boil on cheek-- not healing  Best Call Back Number:  [phone]  Additional Information:

## 2022-09-08 ENCOUNTER — PATIENT MESSAGE (OUTPATIENT)
Dept: FAMILY MEDICINE | Facility: CLINIC | Age: 67
End: 2022-09-08
Payer: COMMERCIAL

## 2022-09-08 NOTE — TELEPHONE ENCOUNTER
Spoke with pt via phone. She states that she loves Mrs. Andrews but she had a few issues with her notes and would like for these to be addressed or removed.   Lesion was first notice during the first week of August  Not a blister (looked like white pimple)  Pt would like abdomen notes removed due to not discussing abdomen. Pt declines any issues with abdomen at this time.  Bruise/bleed easily- Pt notes that she does bruise and bleed easily.   Obese plan was not discussed with her and there were no materials/diet given to her.   Advised pt that documentation is typically done in template style that is personalized to each pt. Pt was not agreeable to this and advised that she would still like for these changes to be made. Pt also noted that her appt with Derm was not until December and she still does not know what could be wrong with her cheek. Pt also asked if meds would be sent to her pharmacy. Offered sooner visit at [facility] for Derm, pt declined. Offered to go to external Derm clinic, pt declined. Will route msg to provider for further action.

## 2022-09-13 ENCOUNTER — PATIENT MESSAGE (OUTPATIENT)
Dept: FAMILY MEDICINE | Facility: CLINIC | Age: 67
End: 2022-09-13
Payer: COMMERCIAL

## 2022-09-14 DIAGNOSIS — Z78.0 MENOPAUSE: ICD-10-CM

## 2022-09-15 RX ORDER — DOXYCYCLINE 100 MG/1
100 CAPSULE ORAL 2 TIMES DAILY
Qty: 20 CAPSULE | Refills: 0 | Status: SHIPPED | OUTPATIENT
Start: 2022-09-15 | End: 2022-09-20 | Stop reason: SINTOL

## 2022-09-15 NOTE — TELEPHONE ENCOUNTER
I spoke with this patient today and she is concerned about possible intact her sees in her note when she saw Pleschetta Jackson on 09/01/2022.  I will send an e-mail to miss Andrews to contact patient to discuss correction of any possible intact her sees.  In addition, she still has the wound on her cheek.  I told her I will send in an antibiotic to see if this is an acute infection.  She will touch base with be early next week to see if it is getting better or not.  If it is not getting any better, I will arrange to see her next week.

## 2022-09-19 ENCOUNTER — PATIENT MESSAGE (OUTPATIENT)
Dept: FAMILY MEDICINE | Facility: CLINIC | Age: 67
End: 2022-09-19
Payer: COMMERCIAL

## 2022-09-19 NOTE — TELEPHONE ENCOUNTER
I can see her at 2:30 tomorrow afternoon in my clinic. Please ask to open up that one slot. Thanks!

## 2022-09-20 ENCOUNTER — OFFICE VISIT (OUTPATIENT)
Dept: FAMILY MEDICINE | Facility: CLINIC | Age: 67
End: 2022-09-20
Payer: MEDICARE

## 2022-09-20 VITALS
HEART RATE: 96 BPM | BODY MASS INDEX: 34.03 KG/M2 | HEIGHT: 62 IN | WEIGHT: 184.94 LBS | RESPIRATION RATE: 17 BRPM | TEMPERATURE: 98 F | OXYGEN SATURATION: 96 %

## 2022-09-20 DIAGNOSIS — L98.9 FACIAL SKIN LESION: Primary | ICD-10-CM

## 2022-09-20 PROCEDURE — 99999 PR PBB SHADOW E&M-EST. PATIENT-LVL III: CPT | Mod: PBBFAC,,, | Performed by: FAMILY MEDICINE

## 2022-09-20 PROCEDURE — 99213 PR OFFICE/OUTPT VISIT, EST, LEVL III, 20-29 MIN: ICD-10-PCS | Mod: S$PBB,,, | Performed by: FAMILY MEDICINE

## 2022-09-20 PROCEDURE — 99213 OFFICE O/P EST LOW 20 MIN: CPT | Mod: PBBFAC,PN | Performed by: FAMILY MEDICINE

## 2022-09-20 PROCEDURE — 99999 PR PBB SHADOW E&M-EST. PATIENT-LVL III: ICD-10-PCS | Mod: PBBFAC,,, | Performed by: FAMILY MEDICINE

## 2022-09-20 PROCEDURE — 99213 OFFICE O/P EST LOW 20 MIN: CPT | Mod: S$PBB,,, | Performed by: FAMILY MEDICINE

## 2022-09-20 NOTE — PROGRESS NOTES
Subjective:       Patient ID: Abi Duran is a 67 y.o. female.    Chief Complaint: Mass (Pt states that she has had a spot on her right side of her face that started on the first week of august. Patient states that it is painful to touch.)    Patient presents with approximately 6 week history of a gradually enlarging lesion on the right cheek.  She states that she noticed this the 1st week of August and appeared almost like a pimple like structure.  She also states that there was a small hair that she probably plucked.  Since that time, it has been gradually enlarging.  She states that she did try to squeeze the lesion and nothing came out.  There has been no surrounding redness and no evidence of surrounding infection.  I did start her on doxycycline before I saw her for several days but she can only tolerate 6 doses due to GI upset.  When I look at it today, it really does not look like an infectious source.  She does state that she had another type of lesion in the past which resulted from a small shard of bone from dental surgery.  She did have a tooth removed and the tooth splinter in the right maxilla 2 years ago but has had no problems since that time.    Review of Systems   Constitutional:  Negative for chills and fever.   HENT:  Positive for facial swelling (Right facial lesion). Negative for dental problem and sinus pressure/congestion.        Objective:      Physical Exam  HENT:      Right Ear: Tympanic membrane and external ear normal.      Left Ear: Tympanic membrane and external ear normal.      Mouth/Throat:      Mouth: Mucous membranes are moist.      Pharynx: No posterior oropharyngeal erythema.      Comments: No fluctuance noted on the inside of the mouth when palpating in the area of the lesion in the right buccal cavity.  There is no prominence of the mass to the inside of the mouth either  Lymphadenopathy:      Cervical: No cervical adenopathy.       Assessment:       Problem List Items  Addressed This Visit    None  Visit Diagnoses       Facial skin lesion    -  Primary            Plan:       1. I spoke with Dr. Leilani Miller in Dermatology and she will see the patient on Monday 09/26/2022 for definitive diagnosis and treatment.

## 2022-09-26 ENCOUNTER — PATIENT MESSAGE (OUTPATIENT)
Dept: ADMINISTRATIVE | Facility: HOSPITAL | Age: 67
End: 2022-09-26
Payer: COMMERCIAL

## 2022-09-26 ENCOUNTER — OFFICE VISIT (OUTPATIENT)
Dept: DERMATOLOGY | Facility: CLINIC | Age: 67
End: 2022-09-26
Payer: MEDICARE

## 2022-09-26 DIAGNOSIS — D48.5 NEOPLASM OF UNCERTAIN BEHAVIOR OF SKIN: Primary | ICD-10-CM

## 2022-09-26 PROCEDURE — 99213 OFFICE O/P EST LOW 20 MIN: CPT | Mod: PBBFAC,PO | Performed by: STUDENT IN AN ORGANIZED HEALTH CARE EDUCATION/TRAINING PROGRAM

## 2022-09-26 PROCEDURE — 99999 PR PBB SHADOW E&M-EST. PATIENT-LVL III: ICD-10-PCS | Mod: PBBFAC,,, | Performed by: STUDENT IN AN ORGANIZED HEALTH CARE EDUCATION/TRAINING PROGRAM

## 2022-09-26 PROCEDURE — 99999 PR PBB SHADOW E&M-EST. PATIENT-LVL III: CPT | Mod: PBBFAC,,, | Performed by: STUDENT IN AN ORGANIZED HEALTH CARE EDUCATION/TRAINING PROGRAM

## 2022-09-26 PROCEDURE — 11102 PR TANGENTIAL BIOPSY, SKIN, SINGLE LESION: ICD-10-PCS | Mod: S$PBB,,, | Performed by: STUDENT IN AN ORGANIZED HEALTH CARE EDUCATION/TRAINING PROGRAM

## 2022-09-26 PROCEDURE — 11102 TANGNTL BX SKIN SINGLE LES: CPT | Mod: S$PBB,,, | Performed by: STUDENT IN AN ORGANIZED HEALTH CARE EDUCATION/TRAINING PROGRAM

## 2022-09-26 PROCEDURE — 88305 TISSUE EXAM BY PATHOLOGIST: CPT | Performed by: PATHOLOGY

## 2022-09-26 PROCEDURE — 88305 TISSUE EXAM BY PATHOLOGIST: CPT | Mod: 26,,, | Performed by: PATHOLOGY

## 2022-09-26 PROCEDURE — 88305 TISSUE EXAM BY PATHOLOGIST: ICD-10-PCS | Mod: 26,,, | Performed by: PATHOLOGY

## 2022-09-26 PROCEDURE — 11102 TANGNTL BX SKIN SINGLE LES: CPT | Mod: PBBFAC,PO | Performed by: STUDENT IN AN ORGANIZED HEALTH CARE EDUCATION/TRAINING PROGRAM

## 2022-09-26 PROCEDURE — 99499 NO LOS: ICD-10-PCS | Mod: S$PBB,,, | Performed by: STUDENT IN AN ORGANIZED HEALTH CARE EDUCATION/TRAINING PROGRAM

## 2022-09-26 PROCEDURE — 99499 UNLISTED E&M SERVICE: CPT | Mod: S$PBB,,, | Performed by: STUDENT IN AN ORGANIZED HEALTH CARE EDUCATION/TRAINING PROGRAM

## 2022-09-26 NOTE — PATIENT INSTRUCTIONS
Shave Biopsy Wound Care    Your doctor has performed a shave biopsy today.  A band aid and vaseline ointment has been placed over the site.  This should remain in place for 24 hours.  It is recommended that you keep the area dry for the first 24 hours.  After 24 hours, you may remove the band aid and wash the area with warm soap and water and apply Vaseline jelly.  Many patients prefer to use Neosporin or Bacitracin ointment.  This is acceptable; however, know that you can develop an allergy to this medication even if you have used it safely for years.  It is important to keep the area moist.  Letting it dry out and get air slows healing time, and will worsen the scar.  Band aid is optional after first 24 hours.      If you notice increasing redness, tenderness, pain, or yellow drainage at the biopsy site, please notify your doctor.  These are signs of an infection.    If your biopsy site is bleeding, apply firm pressure for 15 minutes straight.  Repeat for another 15 minutes, if it is still bleeding.   If the surgical site continues to bleed, then please contact your doctor.      [facility] [place] [address]/ [phone] FAX/ [email or web address]

## 2022-09-26 NOTE — PROGRESS NOTES
Subjective:       Patient ID:  Abi Duran is a 67 y.o. female who presents for   Chief Complaint   Patient presents with    Spot     Right cheek     New patient    Patient here today for spot to right cheek x 2 months. Red, hard, and painful when touched and when talking. Admits to squeezing but nothing comes out. Pt previously treated with Bactroban and oral course of doxycycline 100 mg x 3 days. Applying warm compresses nightly.    Denies Phx NMSC      Review of Systems   Constitutional:  Negative for fever and chills.   Respiratory:  Negative for cough and shortness of breath.    Gastrointestinal:  Negative for nausea and vomiting.      Objective:    Physical Exam   Constitutional: She appears well-developed and well-nourished.   Neurological: She is alert and oriented to person, place, and time.   Psychiatric: She has a normal mood and affect.   Skin:   Areas Examined (abnormalities noted in diagram):   Head / Face Inspection Performed            Diagram Legend     Erythematous scaling macule/papule c/w actinic keratosis       Vascular papule c/w angioma      Pigmented verrucoid papule/plaque c/w seborrheic keratosis      Yellow umbilicated papule c/w sebaceous hyperplasia      Irregularly shaped tan macule c/w lentigo     1-2 mm smooth white papules consistent with Milia      Movable subcutaneous cyst with punctum c/w epidermal inclusion cyst      Subcutaneous movable cyst c/w pilar cyst      Firm pink to brown papule c/w dermatofibroma      Pedunculated fleshy papule(s) c/w skin tag(s)      Evenly pigmented macule c/w junctional nevus     Mildly variegated pigmented, slightly irregular-bordered macule c/w mildly atypical nevus      Flesh colored to evenly pigmented papule c/w intradermal nevus       Pink pearly papule/plaque c/w basal cell carcinoma      Erythematous hyperkeratotic cursted plaque c/w SCC      Surgical scar with no sign of skin cancer recurrence      Open and closed comedones       Inflammatory papules and pustules      Verrucoid papule consistent consistent with wart     Erythematous eczematous patches and plaques     Dystrophic onycholytic nail with subungual debris c/w onychomycosis     Umbilicated papule    Erythematous-base heme-crusted tan verrucoid plaque consistent with inflamed seborrheic keratosis     Erythematous Silvery Scaling Plaque c/w Psoriasis     See annotation        Assessment / Plan:      Pathology Orders:       Normal Orders This Visit    Specimen to Pathology, Dermatology     Questions:    Procedure Type: Dermatology and skin neoplasms    Number of Specimens: 1    ------------------------: -------------------------    Spec 1 Procedure: Biopsy    Spec 1 Clinical Impression: KA    Spec 1 Source: right cheek    Release to patient:           Neoplasm of uncertain behavior of skin  -     Specimen to Pathology, Dermatology  Shave biopsy procedure note:    Shave biopsy performed after verbal consent including risk of infection, scar, recurrence, need for additional treatment of site. Area prepped with alcohol, anesthetized with approximately 1.0cc of 1% lidocaine with epinephrine. Lesional tissue shaved with razor blade. Hemostasis achieved with application of aluminum chloride followed by hyfrecation. No complications. Dressing applied. Wound care explained.           No follow-ups on file.

## 2022-09-27 ENCOUNTER — PATIENT MESSAGE (OUTPATIENT)
Dept: FAMILY MEDICINE | Facility: CLINIC | Age: 67
End: 2022-09-27
Payer: COMMERCIAL

## 2022-09-27 NOTE — TELEPHONE ENCOUNTER
I spoke to patient on yesterday 9/26/22. Patient reports no one called her from administration to address concerns or complaints about office visit charting-ask I requested Alcira Marquez to reach out on two separate occasions. Alcira informed by she call patient both times but no answer.  This has been an ongoing issue for 2-3 weeks now. Patient and I discuss in detail why she had concerns about our office visit charting. I apologized to patient and charting changed. If patient needs anymore changed in office visit charting she has to go through the Medical Records department to request changes, than I will change if appropriate.    Please call patient with Medical Records number.

## 2022-09-28 LAB
FINAL PATHOLOGIC DIAGNOSIS: NORMAL
GROSS: NORMAL
Lab: NORMAL
MICROSCOPIC EXAM: NORMAL

## 2022-09-29 ENCOUNTER — PATIENT MESSAGE (OUTPATIENT)
Dept: FAMILY MEDICINE | Facility: CLINIC | Age: 67
End: 2022-09-29
Payer: COMMERCIAL

## 2022-09-29 ENCOUNTER — TELEPHONE (OUTPATIENT)
Dept: DERMATOLOGY | Facility: CLINIC | Age: 67
End: 2022-09-29
Payer: COMMERCIAL

## 2022-10-06 ENCOUNTER — OFFICE VISIT (OUTPATIENT)
Dept: DERMATOLOGY | Facility: CLINIC | Age: 67
End: 2022-10-06
Payer: MEDICARE

## 2022-10-06 VITALS
HEIGHT: 62 IN | BODY MASS INDEX: 34.04 KG/M2 | SYSTOLIC BLOOD PRESSURE: 194 MMHG | HEART RATE: 79 BPM | DIASTOLIC BLOOD PRESSURE: 104 MMHG | WEIGHT: 185 LBS

## 2022-10-06 DIAGNOSIS — F41.9 ANXIETY: ICD-10-CM

## 2022-10-06 DIAGNOSIS — C44.329 SQUAMOUS CELL CANCER OF SKIN OF RIGHT CHEEK: Primary | ICD-10-CM

## 2022-10-06 PROCEDURE — 99214 OFFICE O/P EST MOD 30 MIN: CPT | Mod: S$PBB,,, | Performed by: DERMATOLOGY

## 2022-10-06 PROCEDURE — 99214 PR OFFICE/OUTPT VISIT, EST, LEVL IV, 30-39 MIN: ICD-10-PCS | Mod: S$PBB,,, | Performed by: DERMATOLOGY

## 2022-10-06 PROCEDURE — 99999 PR PBB SHADOW E&M-EST. PATIENT-LVL III: CPT | Mod: PBBFAC,,, | Performed by: DERMATOLOGY

## 2022-10-06 PROCEDURE — 99999 PR PBB SHADOW E&M-EST. PATIENT-LVL III: ICD-10-PCS | Mod: PBBFAC,,, | Performed by: DERMATOLOGY

## 2022-10-06 PROCEDURE — 99213 OFFICE O/P EST LOW 20 MIN: CPT | Mod: PBBFAC,PO | Performed by: DERMATOLOGY

## 2022-10-06 RX ORDER — ALPRAZOLAM 0.25 MG/1
TABLET ORAL
Qty: 2 TABLET | Refills: 0
Start: 2022-10-06 | End: 2023-09-14

## 2022-10-06 NOTE — PROGRESS NOTES
ALLERGIES:  Amoxicillin, Erythromycin, Oxycodone, Phenergan [promethazine], and Imitrex [sumatriptan succinate]    CHIEF COMPLAINT:  This 67 y.o. 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 Consultation requested by Leilani Miller M.D..    The patient is accompanied to this visit by her .     HISTORY OF PRESENT ILLNESS:   Location: right cheek  Duration: 3 months or more  Quality: persistent  Context: status post biopsy by Leilani Miller M.D; path = as below; pathology accession #[id number], [facility]     Defibrillator: No  Pacemaker: No  Artificial heart valves: No  Artificial joints: No       Final Pathologic Diagnosis   Date Value Ref Range Status   09/26/2022   Final    1. Skin, right cheek, shave biopsy:  - INVASIVE SQUAMOUS CELL CARCINOMA, WELL-DIFFERENTIATED.  - THE TUMOR EXTENDS TO THE DEEP AND LATERAL BIOPSY MARGINS.  This lesion is skin cancer. You will be contacted regarding treatment.       Comment:     Interp By Karissa Hernández M.D., Signed on 09/28/2022 at 19:29         REVIEW OF SYSTEMS:   General: general health good  Skin: previous skin cancer(s) No   If yes, details:   Relevant other:  No   Cardiovascular:   High Blood Pressure: No   Chest Pain:  No   Defibrillator: as above  Pacemaker: as above  Artificial heart valves: as above  Prior Endocarditis: No   Prior Heart Attack/MI: No     If yes, when:   Prior Cardiac Bypass or Stents:  No   If yes, when:   Mitral Valve Prolapse: No   Relevant other:  No   Respiratory:   Shortness of breath:  No   Relevant other:  No   Endocrine:   Diabetes:  No   Relevant other:  No   Hem/Lymph:   Taking Prescribed Blood Thinners:  No   Easy Bleeding:  Yes  Relevant other:  No   Allergy/Immuno: as noted above  Relevant other:  No   GI:   Prior Hepatitis:  No     If yes, details:   Relevant other:  No   Musculoskeletal:    Artificial joints: as above  Relevant other:  No   Neurologic:   Prior Stroke:  No     If yes, details:   Relevant other: Anxiety  Relevant other info:  No         PAST MEDICAL HISTORY:  Past Medical History:   Diagnosis Date    Allergy     Arthritis     GERD (gastroesophageal reflux disease)     Ulcer        PAST SURGICAL HISTORY:    Past Surgical History:   Procedure Laterality Date    CHOLECYSTECTOMY      EYE SURGERY      HYSTERECTOMY      KNEE SURGERY      TONSILLECTOMY, ADENOIDECTOMY, BILATERAL MYRINGOTOMY AND TUBES      TUBAL LIGATION          SOCIAL HISTORY:  Dependencies: smoking status as noted below  Social History     Tobacco Use    Smoking status: Never    Smokeless tobacco: Never   Substance Use Topics    Alcohol use: No    Drug use: No       PERTINENT MEDICATIONS:  See medications list.    Current Outpatient Medications:     BORON ORAL, Take by mouth., Disp: , Rfl:     ALLERGIES:  Amoxicillin, Erythromycin, Oxycodone, Phenergan [promethazine], and Imitrex [sumatriptan succinate]    EXAM:  Constitutional  General appearance: well-developed, well-nourished, well-kempt older white female      Neurologic/Psychiatric  Alert,  normal orientation to time, place, person  Somewhat anxious affect  Skin: see photo(s)  Head: background moderate solar damage to exposed areas of skin  inspection/palpation reveals an approximately 1+ cm crusted biopsy site on the right cheek   she confirmed this as the site of the prior biopsy and site(s) confirmed by reference to the photograph(s) attached below taken at the time of the biopsy/biopsies by the referring physician  Photo(s) this visit:      Photo(s) from biopsy visit:      ASSESSMENT: biopsy-proven squamous cell carcinoma of the right cheek  chronic solar damage to areas as noted above  Anxiety    PLAN:  The diagnosis and management options, and risks and benefits of the alternatives, including observation/non-treatment, radiation treatment, excision with vertical  frozen section or paraffin-embedded section margin evaluation, and Mohs' Micrographic Surgery, Fresh Tissue Technique, were discussed at length with the patient and her . In particular, the discussion included, but was not limited to, the following:    One alternative at this point would be to defer further treatment and observe the lesion. With small skin cancers of this kind, it is possible that a biopsy can be sufficient to definitively treat a small skin cancer of this kind. Alternatively, some skin cancers are slow growing and do not require immediate treatment. The potential advantage of this choice would be to avoid the need for possibly unnecessary additional surgery. Among the potential disadvantages of this would be the possibility of enlargement of the lesion, more extensive spread of the lesion or recurrence at a later date, which might necessitate a larger and more complex surgery.    Radiation treatment can be an effective treatment for this type of skin cancer. The usual course of treatment is every weekday for several weeks. Local irritation will result from treatment, although no systemic side effects are expected. The potential advantage of radiation treatment is that it avoids the need for surgery. Among the disadvantages of radiation treatment are the length of treatment, the local inflammatory response, the absence of pathologic confirmation of the removal of the skin cancer, a possible increased risk of additional skin cancer in the treated area in later years, and a somewhat increased risk of recurrence at a later date.     Excisional surgery can be an effective treatment for this type of skin cancer. This would involve excision of the lesion with margin evaluation by submitting the specimen to a pathologist for either immediate marginal assessment via frozen section processing, or delayed marginal assessment by fixed-tissue processing. The potential advantage of this technique is that  it offers a way of treating the lesion with some degree of histologic confirmation of tumor removal. Among the disadvantages of this treatment are the possible need for re-excision if marginal involvement is identified, a somewhat greater likelihood of recurrence as compared to Mohs' surgery because of the less comprehensive margin evaluation inherent in the technique, and the general potential risks of surgery, including allergic reactions to the anesthetic and other materials used, infection, injury to nerves in the area with consequent loss of sensation or muscle function, and scarring or distortion of surrounding structures.    Mohs' surgery is a very effective treatment for this type of skin cancer. The potential advantage of Mohs' surgery is that this technique offers the greatest possible certainty of knowing that the skin cancer has been completely removed, with the removal of the least amount of normal tissue. The potential disadvantages of Mohs' surgery include the duration of the surgery, the possible need for a separate surgery for reconstruction following tumor removal, and scarring as a result. In addition, general potential risks of surgery as noted above also apply to treatment via Mohs' surgery.    In light of the nature of this tumor and the location on the face in an area of increased risk of recurrence,  Mohs' micrographic surgery was thought to be the most appropriate management choice, and this diagnosis is appropriate for treatment by Mohs' micrographic surgery.     We also discussed options for management of the wound following completion of tumor removal via the Mohs' technique. This discussion include a review of the nature of, and risks and benefits of the alternatives, including healing via secondary intention, primary linear closure, closure via adjacent tissue transfer/skin flap(s), and closure by use of a skin graft.     Sufficient time was available for questions, and all questions  were answered to her satisfaction. She fully understands the aims, risks, alternatives, and possible complications, and has elected to proceed with the surgery, and verbally consented to do so. The procedure will be scheduled in the near future.    She requested a prescription for anxiety to take before the procedure. I have given her a prescription for Xanax, 0.25 mg, #2, to bring with her and take after signing the consent on the day of the surgery.    Routine pre-op instructions were given to her.    --------------------------------------  Note: Some or all of this note may have been generated using voice recognition software. There may be voice recognition errors including grammatical and/or spelling errors found in the text. Attempts were made to correct these errors prior to signature.

## 2022-11-08 NOTE — PROGRESS NOTES
Prior photo(s) of site(s) to confirm location(s):    Defibrillator: No  Pacemaker: No  Artificial heart valves: No  Artificial joints: No    ALLERGIES:   Amoxicillin, Erythromycin, Oxycodone, Phenergan [promethazine], and Imitrex [sumatriptan succinate]      Current Outpatient Medications:     ALPRAZolam (XANAX) 0.25 MG tablet, Take one tablet as directed, one hour prior to surgery, Disp: 2 tablet, Rfl: 0    BORON ORAL, Take by mouth., Disp: , Rfl:   -------------------------------------------------------------  PROCEDURE: Mohs' Micrographic Surgery    SITE: right cheek    INDICATION: squamous cell carcinoma in an area at increased risk of recurrence    CASE NUMBER: [id number]      ANESTHETIC: 5 mL 1% Lidocaine with Epinephrine 1:100,000    SURGICAL PREP: Ethanol ophthalmic Betadine    SURGEON: Jurgen Mei MD    ASSISTANTS: Juani Perez CST     PREOPERATIVE DIAGNOSIS: squamous cell carcinoma     POSTOPERATIVE DIAGNOSIS: squamous cell carcinoma     PATHOLOGIC DIAGNOSIS: well-differentiated squamous cell carcinoma      STAGES OF MOHS' SURGERY PERFORMED: one    TUMOR-FREE PLANE ACHIEVED: yes    HEMOSTASIS: Hyfrecation    SPECIMENS: one (one in stage A)    INITIAL LESION SIZE: 1.2 x 1.2 cm    FINAL DEFECT SIZE: 1.3 x 1.5 cm    WOUND REPAIR/DISPOSITION: see below    NARRATIVE:    The patient is a 67 y.o.female referred by Leilani Miller MD with a history of cancer on the right cheek which was biopsied - pathology accession #[id number], [facility]. Findings revealed squamous cell carcinoma. Examination revealed a pink scar on the right cheek at the site of prior biopsy, which was confirmed by reference to the photograph taken at the previous patient visit, as attached above. In light of the nature of this tumor and the location on the face, Mohs' micrographic surgery was thought to be the most appropriate management choice, and this diagnosis is appropriate for treatment by Mohs' micrographic surgery.  I  "discussed it with the patient and she fully understands the aims, risks, alternatives, and possible complications, and elects to proceed.  There are no medical or surgical contraindications to the procedure.     A signed informed consent was obtained.    PROCEDURE:  The patient was placed in the semi-recumbent position on the operating table in the Mohs' Surgery Suite. The area in question was thoroughly prepped with ethanol and ophthalmic Betadine. A sterile surgical marker was used to outline the clinically apparent margins of the involved area, and a narrow margin of normal-appearing skin. Reference marks were made at the periphery of the outlined area with the surgical marker. The proposed area of excision was measured and photographed. Local anesthesia as noted above was administered.  The total volume of anesthetic used throughout this portion of the procedure was as documented above. The area was prepared and draped in the standard manner. All of the grossly identifiable area of clinically abnormal tissue and an underlying/peripheral layer was taken and processed by the Mohs' technique.  Hemostasis was obtained with the hyfrecator. Tissue was taken from any areas of residual marginal involvement (if present) and processed by the Mohs' technique in as many stages as needed until a tumor-free plane was achieved.    Colors of inks used in the reference nicks at epidermal margins (if present) and/or inking of non-epithelial edges, if applicable, is represented on the Mohs map as follows: solid lines represent red ink, dots represent blue ink, jagged lines represent black ink, curlicues represent green ink, "xxx" represents yellow ink.    The first Mohs' layer consisted of one section(s) with 5 slide(s) evaluated. No residual tumor was noted at the margins of the first Mohs' layer. Histology of the specimen(s) showed changes consistent with chronic solar damage.    A total of one section(s) and 5 slide(s) were " examined under the microscope via the Mohs technique.  A cancer free plane was reached after layer number one. Defect final size was as noted above.      The wound was covered with a nonadherent dressing between stages, and the patient allowed to wait in the waiting area during these periods. The final defect was photographed at the completion of the Mohs' procedure.    See the separate procedure note which follows regarding repair of the defect following Mohs' surgery.        -----------------------------------------------    REPAIR FOLLOWING MOHS' MICROGRAPHIC SURGERY    PREOPERATIVE DIAGNOSIS: defect following Mohs' surgery for a squamous cell carcinoma    POSTOPERATIVE DIAGNOSIS: same    PROCEDURE PERFORMED: intermediate (layered) closure     ANESTHETIC: 7 mL 1% Lidocaine with Epinephrine 1:100,000     SURGICAL PREP:ophthalmic Betadine    SURGEON: Jurgen Mei MD     ASSISTANTS: as above    LOCATION: right cheek      INDICATIONS:  Earlier in the day, the patient underwent Mohs' micrographic surgical excision 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. Tumor free margins were achieved after layer number one.  Later in the day, the management of the resulting wound was addressed with the patient. I discussed the various wound management options with the patient and she fully understands the aims, risks, alternatives, and possible complications of the alternatives, and she elects to proceed with closure of the defect in the manner noted below.  There are no medical or surgical contraindications to the procedure.    A signed informed consent was previously obtained.    PROCEDURE:  Repair via intermediate closure:  The patient was returned to the procedure room following completion of the Mohs' procedure and final slide review. Because of the size, shape and location of the defect, simple closure could not be achieved without excessive tension on the wound margins and an unacceptable risk of wound  dehiscence and standing cone deformities. After surgical prepping, additional anesthetic was infiltrated into the tissues surrounding the defect and the anticipated area of repair, to maintain anesthesia during the procedure. Preparation of the site was carried out by extending the defect through excision of small triangles of superfluous tissue on either side of the wound to square the shoulders of the defect and to allow closure without distortion by standing cone deformities, creating a fusiform defect measuring 1.3 x 4.5 cm in size. Wound margins were minimally undermined to allow closure with minimal tension. After hemostasis was achieved with the hyfrecator, closure was accomplished in layered fashion with:      seven #5-0 buried interrupted Vicryl suture(s) and    two #5-0 simple interrupted Prolene suture(s) and    two #5-0 running locked Prolene suture(s) for final approximation of the wound margins.    Total length of the final closure was 4.5 cm.     The site was photographed following completion of the repair. Final dressing consisted of petrolatum, Telfa and tape.    Estimated blood loss for the total procedure was less than 10 mL.    Total operative time including tissue processing in the Mohs' laboratory and microscopic Mohs' frozen section slide review was 2 hour(s). Verbal and written wound care instructions were given to the patient, and she expressed understanding of these instructions. The patient tolerated the procedure well and left the operating room in good condition; she is to return in 7 days for suture removal.     Dr. Mei's cell phone number was given to the patient with instructions to call prn with any problems.

## 2022-11-09 ENCOUNTER — PROCEDURE VISIT (OUTPATIENT)
Dept: DERMATOLOGY | Facility: CLINIC | Age: 67
End: 2022-11-09
Payer: MEDICARE

## 2022-11-09 VITALS
BODY MASS INDEX: 33.86 KG/M2 | DIASTOLIC BLOOD PRESSURE: 100 MMHG | HEART RATE: 80 BPM | HEIGHT: 62 IN | WEIGHT: 184 LBS | SYSTOLIC BLOOD PRESSURE: 140 MMHG

## 2022-11-09 DIAGNOSIS — C44.329 SQUAMOUS CELL CANCER OF SKIN OF RIGHT CHEEK: Primary | ICD-10-CM

## 2022-11-09 PROCEDURE — 12052 INTMD RPR FACE/MM 2.6-5.0 CM: CPT | Mod: S$PBB,51,, | Performed by: DERMATOLOGY

## 2022-11-09 PROCEDURE — 99499 NO LOS: ICD-10-PCS | Mod: S$PBB,,, | Performed by: DERMATOLOGY

## 2022-11-09 PROCEDURE — 12052 INTMD RPR FACE/MM 2.6-5.0 CM: CPT | Mod: PBBFAC,PO | Performed by: DERMATOLOGY

## 2022-11-09 PROCEDURE — 12052 PR INTERMED WOUND REPAIR FACE/EAR/EYELID/NOSE/LIP/MUC MEBR, 2.6 TO 5.0CM: ICD-10-PCS | Mod: S$PBB,51,, | Performed by: DERMATOLOGY

## 2022-11-09 PROCEDURE — 17311: ICD-10-PCS | Mod: S$PBB,,, | Performed by: DERMATOLOGY

## 2022-11-09 PROCEDURE — 17311 MOHS 1 STAGE H/N/HF/G: CPT | Mod: S$PBB,,, | Performed by: DERMATOLOGY

## 2022-11-09 PROCEDURE — 17311 MOHS 1 STAGE H/N/HF/G: CPT | Mod: PBBFAC,PO | Performed by: DERMATOLOGY

## 2022-11-09 PROCEDURE — 99499 UNLISTED E&M SERVICE: CPT | Mod: S$PBB,,, | Performed by: DERMATOLOGY

## 2022-11-14 ENCOUNTER — TELEPHONE (OUTPATIENT)
Dept: DERMATOLOGY | Facility: CLINIC | Age: 67
End: 2022-11-14
Payer: COMMERCIAL

## 2022-11-14 NOTE — TELEPHONE ENCOUNTER
"Patent left VM Friday 11/11. Returned call. After conversation with patient, sent the following message to Dr. Gonzalez.      "Returned patient's message from Friday. She says there has been no bleeding since her  spoke to you Thursday, and the incision appears the same as the photo she sent. She says she has a stomach ulcer and taking the ibuprofen causes stomach pain, so she has been taking only tylenol, but she is still have significant pain with any movement, and was asking if there is anything else she can take. She has also been putting ice packs on the incision for pain relief. She declined coming in today. "  "

## 2022-11-16 ENCOUNTER — OFFICE VISIT (OUTPATIENT)
Dept: DERMATOLOGY | Facility: CLINIC | Age: 67
End: 2022-11-16
Payer: MEDICARE

## 2022-11-16 DIAGNOSIS — Z48.02 VISIT FOR SUTURE REMOVAL: Primary | ICD-10-CM

## 2022-11-16 PROCEDURE — 99999 PR PBB SHADOW E&M-EST. PATIENT-LVL II: CPT | Mod: PBBFAC,,, | Performed by: DERMATOLOGY

## 2022-11-16 PROCEDURE — 99212 OFFICE O/P EST SF 10 MIN: CPT | Mod: PBBFAC,PO | Performed by: DERMATOLOGY

## 2022-11-16 PROCEDURE — 99999 PR PBB SHADOW E&M-EST. PATIENT-LVL II: ICD-10-PCS | Mod: PBBFAC,,, | Performed by: DERMATOLOGY

## 2022-11-16 PROCEDURE — 99024 POSTOP FOLLOW-UP VISIT: CPT | Mod: POP,,, | Performed by: DERMATOLOGY

## 2022-11-16 PROCEDURE — 99024 PR POST-OP FOLLOW-UP VISIT: ICD-10-PCS | Mod: POP,,, | Performed by: DERMATOLOGY

## 2022-11-16 NOTE — PROGRESS NOTES
CC: 67 y.o.female patient is here for suture removal.     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  Wound healing well.  Good approximation of skin edges.  No undue erythema to surrounding skin or signs or symptoms of infection.  No evidence of hematoma or other obvious cause of her persistent pain    IMPRESSION:  Healing well post Mohs' micrographic surgery and repair    PLAN:  Site cleaned with peroxide, sutures removed  Dressed with petrolatum   Reviewed further care and expected course  Followup 6 weeks; call prn sooner  Continue tylenol prn for pain

## 2022-12-07 ENCOUNTER — DOCUMENTATION ONLY (OUTPATIENT)
Dept: DERMATOLOGY | Facility: CLINIC | Age: 67
End: 2022-12-07
Payer: COMMERCIAL

## 2022-12-07 ENCOUNTER — TELEPHONE (OUTPATIENT)
Dept: DERMATOLOGY | Facility: CLINIC | Age: 67
End: 2022-12-07
Payer: COMMERCIAL

## 2022-12-07 NOTE — TELEPHONE ENCOUNTER
Patient called and stated that her surgery site was red, and looks like it is bleeding under the skin. She will send a photo to Dr. Mei.

## 2022-12-07 NOTE — PROGRESS NOTES
Amy Perez, CST  General Surgery     Conversation  (Newest Message First)  December 7, 2022  Amy Perez, CST     OR     1:23 PM  Note     Patient called and stated that her surgery site was red, and looks like it is bleeding under the skin. She will send a photo to Dr. Mei.             Received photo from patient via text message.  See photo(s) below.  Appearance suggests probable hemorrhagic suture granuloma. Will have patient in tomorrow if possible for evaluation.

## 2022-12-08 ENCOUNTER — OFFICE VISIT (OUTPATIENT)
Dept: DERMATOLOGY | Facility: CLINIC | Age: 67
End: 2022-12-08
Payer: MEDICARE

## 2022-12-08 DIAGNOSIS — T81.89XA SUTURE REACTION, INITIAL ENCOUNTER: Primary | ICD-10-CM

## 2022-12-08 PROCEDURE — 99999 PR PBB SHADOW E&M-EST. PATIENT-LVL II: CPT | Mod: PBBFAC,,, | Performed by: DERMATOLOGY

## 2022-12-08 PROCEDURE — 99212 OFFICE O/P EST SF 10 MIN: CPT | Mod: PBBFAC,PO | Performed by: DERMATOLOGY

## 2022-12-08 PROCEDURE — 99024 POSTOP FOLLOW-UP VISIT: CPT | Mod: POP,,, | Performed by: DERMATOLOGY

## 2022-12-08 PROCEDURE — 99024 PR POST-OP FOLLOW-UP VISIT: ICD-10-PCS | Mod: POP,,, | Performed by: DERMATOLOGY

## 2022-12-08 PROCEDURE — 99999 PR PBB SHADOW E&M-EST. PATIENT-LVL II: ICD-10-PCS | Mod: PBBFAC,,, | Performed by: DERMATOLOGY

## 2022-12-08 NOTE — PROGRESS NOTES
Review of patient's allergies indicates:   Allergen Reactions    Amoxicillin Nausea Only     Cannot tolerate side effects    Erythromycin Nausea Only     Other reaction(s): Nausea    Oxycodone      Other reaction(s): Swelling  Other reaction(s): Flushing (skin)    Phenergan [promethazine] Other (See Comments)     Other reaction(s): Muscle pain  Muscle pain    Imitrex [sumatriptan succinate] Swelling and Palpitations     Other reaction(s): swelling  Other reaction(s): chest pains         Current Outpatient Medications:     ALPRAZolam (XANAX) 0.25 MG tablet, Take one tablet as directed, one hour prior to surgery, Disp: 2 tablet, Rfl: 0    BORON ORAL, Take by mouth., Disp: , Rfl:      CC: 67 y.o.female patient is here for drainage at surgery site     HPI: Patient is one month s/p Mohs' micrographic surgery, fresh tissue technique, 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; with subsequent repair   Patient reports redness and drainage from site last 1-2 days  See photos below    EXAM:   Overall site well healed; there is an area of mild erythema to the superior end of the scar, with a less than 1 mm opening along the incision line which drains a small amount of hemorrhagic fluid on pressure to the area    Photos from patient          Postop      IMPRESSION: status post Mohs' micrographic surgery  Suture reaction to vicryl suture; now draining    PLAN:  Discussed diagnosis, options, risks benefits and alternatives  Expressed contents  Dressed with petrolatum and bandaid  Continue local care with petrolatum/bandaid for 3-4 days  Expect spontaneous resolution  Call prn  ============================================================  PREVIOUS NOTE(S):  Note of 11/16    CC: 67 y.o.female patient is here for suture removal.     HPI: Patient is one week(s) s/p Mohs' micrographic surgery, fresh tissue technique 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, with subsequent repair   Patient reports persistent pain to the  site; has been unable to sleep through the night until last night    EXAM:  Sutures intact.  Wound healing well.  Good approximation of skin edges.  No undue erythema to surrounding skin or signs or symptoms of infection.  No evidence of hematoma or other obvious cause of her persistent pain    IMPRESSION:  Healing well post Mohs' micrographic surgery and repair    PLAN:  Site cleaned with peroxide, sutures removed  Dressed with petrolatum   Reviewed further care and expected course  Followup 6 weeks; call prn sooner  Continue tylenol prn for pain

## 2022-12-12 ENCOUNTER — PATIENT MESSAGE (OUTPATIENT)
Dept: FAMILY MEDICINE | Facility: CLINIC | Age: 67
End: 2022-12-12
Payer: COMMERCIAL

## 2022-12-12 ENCOUNTER — TELEPHONE (OUTPATIENT)
Dept: DERMATOLOGY | Facility: CLINIC | Age: 67
End: 2022-12-12
Payer: COMMERCIAL

## 2022-12-12 RX ORDER — DOXYCYCLINE 100 MG/1
100 CAPSULE ORAL EVERY 12 HOURS
Qty: 20 CAPSULE | Refills: 0 | Status: SHIPPED | OUTPATIENT
Start: 2022-12-12 | End: 2023-09-14

## 2022-12-12 RX ORDER — SULFAMETHOXAZOLE AND TRIMETHOPRIM 800; 160 MG/1; MG/1
1 TABLET ORAL 2 TIMES DAILY
Qty: 20 TABLET | Refills: 0 | Status: SHIPPED | OUTPATIENT
Start: 2022-12-12 | End: 2022-12-12 | Stop reason: CLARIF

## 2022-12-12 NOTE — PROGRESS NOTES
2nd Addendum;3:33 pm 12/12/2022  Patient went to get prescription for Doxycyline; called to report it causes GI upset.  Will have her in tomorrow to evaluate site and possible culture for sensitivity    Addendum:   Pharmacy called to report patient allergic to Sulfa as originally prescribed, although this is not listed in her allergies here  Will Rx Doxycycline 100 mg PO BID x 10 days empirically, pending culture results  Will update allergies.  =========  Review of patient's allergies indicates:   Allergen Reactions    Amoxicillin Nausea Only     Cannot tolerate side effects    Erythromycin Nausea Only     Other reaction(s): Nausea    Oxycodone      Other reaction(s): Swelling  Other reaction(s): Flushing (skin)    Phenergan [promethazine] Other (See Comments)     Other reaction(s): Muscle pain  Muscle pain    Imitrex [sumatriptan succinate] Swelling and Palpitations     Other reaction(s): swelling  Other reaction(s): chest pains         Current Outpatient Medications:     ALPRAZolam (XANAX) 0.25 MG tablet, Take one tablet as directed, one hour prior to surgery, Disp: 2 tablet, Rfl: 0    BORON ORAL, Take by mouth., Disp: , Rfl:     sulfamethoxazole-trimethoprim 800-160mg (BACTRIM DS) 800-160 mg Tab, Take 1 tablet by mouth 2 (two) times daily., Disp: 20 tablet, Rfl: 0     CC: 67 y.o.female patient called today to report increasing redness and tenderness to site of surgery on right cheek     HPI: Patient is 4 weeks s/p Mohs' micrographic surgery, fresh tissue technique, 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; with subsequent repair   Was seen last week on 12/08; see last note; changes interpreted as late reaction to buried suture  Was originally scheduled to come in this afternoon, but given the distance at which she lives from the clinic, was asked to send photo; see below; some residual erythema to the upper scar, and also some noted to the inferior end of the scar    Photo from patient today:      Photos  12/07          IMPRESSION: status post Mohs' micrographic surgery  Possible wound infection; possible community-acquired MRSA    PLAN:  Will Rx Bactrim DS one PO BID x 10 days empirically  Call if not improving over next several days

## 2022-12-12 NOTE — TELEPHONE ENCOUNTER
Called patient to let her know that Dr. Mei will send in a rx for antibiotics. If it is not better by Wednesday call us and we will get her in.

## 2022-12-13 ENCOUNTER — OFFICE VISIT (OUTPATIENT)
Dept: DERMATOLOGY | Facility: CLINIC | Age: 67
End: 2022-12-13
Payer: MEDICARE

## 2022-12-13 PROCEDURE — 99024 PR POST-OP FOLLOW-UP VISIT: ICD-10-PCS | Mod: POP,,, | Performed by: DERMATOLOGY

## 2022-12-13 PROCEDURE — 99212 OFFICE O/P EST SF 10 MIN: CPT | Mod: PBBFAC,PO | Performed by: DERMATOLOGY

## 2022-12-13 PROCEDURE — 99999 PR PBB SHADOW E&M-EST. PATIENT-LVL II: CPT | Mod: PBBFAC,,, | Performed by: DERMATOLOGY

## 2022-12-13 PROCEDURE — 87070 CULTURE OTHR SPECIMN AEROBIC: CPT | Performed by: DERMATOLOGY

## 2022-12-13 PROCEDURE — 99024 POSTOP FOLLOW-UP VISIT: CPT | Mod: POP,,, | Performed by: DERMATOLOGY

## 2022-12-13 PROCEDURE — 99999 PR PBB SHADOW E&M-EST. PATIENT-LVL II: ICD-10-PCS | Mod: PBBFAC,,, | Performed by: DERMATOLOGY

## 2022-12-13 NOTE — PROGRESS NOTES
Review of patient's allergies indicates:   Allergen Reactions    Amoxicillin Nausea Only     Cannot tolerate side effects    Erythromycin Nausea Only     Other reaction(s): Nausea    Oxycodone      Other reaction(s): Swelling  Other reaction(s): Flushing (skin)    Phenergan [promethazine] Other (See Comments)     Other reaction(s): Muscle pain  Muscle pain    Sulfa (sulfonamide antibiotics)     Doxycycline Nausea Only    Imitrex [sumatriptan succinate] Swelling and Palpitations     Other reaction(s): swelling  Other reaction(s): chest pains         Current Outpatient Medications:     ALPRAZolam (XANAX) 0.25 MG tablet, Take one tablet as directed, one hour prior to surgery, Disp: 2 tablet, Rfl: 0    BORON ORAL, Take by mouth., Disp: , Rfl:     doxycycline (MONODOX) 100 MG capsule, Take 1 capsule (100 mg total) by mouth every 12 (twelve) hours., Disp: 20 capsule, Rfl: 0     CC: 67 y.o.female patient is here for followup post Mohs' surgery; with tenderness to site     HPI: Patient is one month s/p Mohs' micrographic surgery, fresh tissue technique, 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; with subsequent repair   See last note below  Patient reports some drainage from upper end of site yesterday; lower end of site also now tender    EXAM: there is some  jeanne-incisional erythema and tenderness on palpation to upper end of the scar; and a tender nodulocystic lesion to the lower end  There is a purulent exudate on pressure to the upper lesion    IMPRESSION: status post Mohs' micrographic surgery  Wound exudate  Possible wound infection; possible community-acquired MRSA    PLAN:  Discussed diagnosis, options, risks benefits and alternatives  prepped inferior lesion with ethanol  Approx 2-3 mm incision made along scar overlying this with a #11 blade  Some purulent/bloody exudate expressed with pressure at both sites  Swabbed with culturette for culture and sensitivity  Followup per culture results  Continue current  care  Will call with culture results when available and coordinate further treatment as needed    ============================================================  PREVIOUS NOTE(S):  Note of 12/08     Review of patient's allergies indicates:   Allergen Reactions    Amoxicillin Nausea Only     Cannot tolerate side effects    Erythromycin Nausea Only     Other reaction(s): Nausea    Oxycodone      Other reaction(s): Swelling  Other reaction(s): Flushing (skin)    Phenergan [promethazine] Other (See Comments)     Other reaction(s): Muscle pain  Muscle pain    Imitrex [sumatriptan succinate] Swelling and Palpitations     Other reaction(s): swelling  Other reaction(s): chest pains         Current Outpatient Medications:     ALPRAZolam (XANAX) 0.25 MG tablet, Take one tablet as directed, one hour prior to surgery, Disp: 2 tablet, Rfl: 0    BORON ORAL, Take by mouth., Disp: , Rfl:      CC: 67 y.o.female patient is here for drainage at surgery site     HPI: Patient is one month s/p Mohs' micrographic surgery, fresh tissue technique, 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; with subsequent repair   Patient reports redness and drainage from site last 1-2 days  See photos below    EXAM:   Overall site well healed; there is an area of mild erythema to the superior end of the scar, with a less than 1 mm opening along the incision line which drains a small amount of hemorrhagic fluid on pressure to the area    Photos from patient          Postop      IMPRESSION: status post Mohs' micrographic surgery  Suture reaction to vicryl suture; now draining    PLAN:  Discussed diagnosis, options, risks benefits and alternatives  Expressed contents  Dressed with petrolatum and bandaid  Continue local care with petrolatum/bandaid for 3-4 days  Expect spontaneous resolution  Call prn  ============================================================  PREVIOUS NOTE(S):  Note of 11/16    CC: 67 y.o.female patient is here for suture  removal.     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  Wound healing well.  Good approximation of skin edges.  No undue erythema to surrounding skin or signs or symptoms of infection.  No evidence of hematoma or other obvious cause of her persistent pain    IMPRESSION:  Healing well post Mohs' micrographic surgery and repair    PLAN:  Site cleaned with peroxide, sutures removed  Dressed with petrolatum   Reviewed further care and expected course  Followup 6 weeks; call prn sooner  Continue tylenol prn for pain

## 2022-12-16 LAB — BACTERIA SPEC AEROBE CULT: NORMAL

## 2023-01-03 ENCOUNTER — TELEPHONE (OUTPATIENT)
Dept: DERMATOLOGY | Facility: CLINIC | Age: 68
End: 2023-01-03
Payer: COMMERCIAL

## 2023-01-03 ENCOUNTER — OFFICE VISIT (OUTPATIENT)
Dept: DERMATOLOGY | Facility: CLINIC | Age: 68
End: 2023-01-03
Payer: MEDICARE

## 2023-01-03 DIAGNOSIS — T81.89XD SUTURE GRANULOMA, SUBSEQUENT ENCOUNTER: Primary | ICD-10-CM

## 2023-01-03 PROCEDURE — 99999 PR PBB SHADOW E&M-EST. PATIENT-LVL II: CPT | Mod: PBBFAC,,, | Performed by: DERMATOLOGY

## 2023-01-03 PROCEDURE — 99212 OFFICE O/P EST SF 10 MIN: CPT | Mod: PBBFAC,PO | Performed by: DERMATOLOGY

## 2023-01-03 PROCEDURE — 99024 PR POST-OP FOLLOW-UP VISIT: ICD-10-PCS | Mod: POP,,, | Performed by: DERMATOLOGY

## 2023-01-03 PROCEDURE — 99999 PR PBB SHADOW E&M-EST. PATIENT-LVL II: ICD-10-PCS | Mod: PBBFAC,,, | Performed by: DERMATOLOGY

## 2023-01-03 PROCEDURE — 99024 POSTOP FOLLOW-UP VISIT: CPT | Mod: POP,,, | Performed by: DERMATOLOGY

## 2023-01-03 NOTE — TELEPHONE ENCOUNTER
Patient called and having problems with surgery s ite, I made him an appointment to come in today.

## 2023-01-03 NOTE — PROGRESS NOTES
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; with subsequent repair  See prior notes  Patient reports tender spot to inferior end of scar x several days  Prior spot has improved; see recent notes    EXAM: healed scar; there is an approx 6 mm somewhat violaceous somewhat fluctuant nodule to the inferior end of the scar    IMPRESSION: status post Mohs' micrographic surgery  Hemorrhagic suture granuloma    PLAN:  Discussed diagnosis, options, risks benefits and alternatives  Verbal consent  Prep with ethanol  2 mm incision made with #11 blade  Small amount of sanguinopurulent material expressed  Discussed expected course  Followup prn if not resolving

## 2023-03-27 ENCOUNTER — PATIENT MESSAGE (OUTPATIENT)
Dept: FAMILY MEDICINE | Facility: CLINIC | Age: 68
End: 2023-03-27
Payer: COMMERCIAL

## 2023-03-27 ENCOUNTER — TELEPHONE (OUTPATIENT)
Dept: FAMILY MEDICINE | Facility: CLINIC | Age: 68
End: 2023-03-27
Payer: COMMERCIAL

## 2023-04-12 ENCOUNTER — PATIENT MESSAGE (OUTPATIENT)
Dept: ADMINISTRATIVE | Facility: HOSPITAL | Age: 68
End: 2023-04-12
Payer: COMMERCIAL

## 2023-04-19 ENCOUNTER — PATIENT OUTREACH (OUTPATIENT)
Dept: ADMINISTRATIVE | Facility: HOSPITAL | Age: 68
End: 2023-04-19
Payer: COMMERCIAL

## 2023-04-19 ENCOUNTER — PATIENT MESSAGE (OUTPATIENT)
Dept: ADMINISTRATIVE | Facility: HOSPITAL | Age: 68
End: 2023-04-19
Payer: COMMERCIAL

## 2023-04-19 DIAGNOSIS — Z12.39 ENCOUNTER FOR SCREENING FOR MALIGNANT NEOPLASM OF BREAST, UNSPECIFIED SCREENING MODALITY: Primary | ICD-10-CM

## 2023-09-08 ENCOUNTER — PATIENT MESSAGE (OUTPATIENT)
Dept: ORTHOPEDICS | Facility: CLINIC | Age: 68
End: 2023-09-08
Payer: MEDICARE

## 2023-09-09 ENCOUNTER — PATIENT MESSAGE (OUTPATIENT)
Dept: ORTHOPEDICS | Facility: CLINIC | Age: 68
End: 2023-09-09
Payer: MEDICARE

## 2023-09-13 DIAGNOSIS — M25.561 RIGHT KNEE PAIN, UNSPECIFIED CHRONICITY: Primary | ICD-10-CM

## 2023-09-14 ENCOUNTER — OFFICE VISIT (OUTPATIENT)
Dept: ORTHOPEDICS | Facility: CLINIC | Age: 68
End: 2023-09-14
Payer: MEDICARE

## 2023-09-14 ENCOUNTER — HOSPITAL ENCOUNTER (OUTPATIENT)
Dept: RADIOLOGY | Facility: HOSPITAL | Age: 68
Discharge: HOME | End: 2023-09-14
Attending: ORTHOPAEDIC SURGERY
Payer: MEDICARE

## 2023-09-14 VITALS — HEIGHT: 62 IN | BODY MASS INDEX: 33.86 KG/M2 | RESPIRATION RATE: 18 BRPM | WEIGHT: 184 LBS

## 2023-09-14 DIAGNOSIS — S83.241A TEAR OF MEDIAL MENISCUS OF RIGHT KNEE, CURRENT, UNSPECIFIED TEAR TYPE, INITIAL ENCOUNTER: Primary | ICD-10-CM

## 2023-09-14 DIAGNOSIS — M25.561 RIGHT KNEE PAIN, UNSPECIFIED CHRONICITY: ICD-10-CM

## 2023-09-14 DIAGNOSIS — S83.241A ACUTE MEDIAL MENISCAL TEAR, RIGHT, INITIAL ENCOUNTER: Primary | ICD-10-CM

## 2023-09-14 PROCEDURE — 99204 PR OFFICE/OUTPT VISIT, NEW, LEVL IV, 45-59 MIN: ICD-10-PCS | Mod: S$PBB,,, | Performed by: ORTHOPAEDIC SURGERY

## 2023-09-14 PROCEDURE — 99204 OFFICE O/P NEW MOD 45 MIN: CPT | Mod: S$PBB,,, | Performed by: ORTHOPAEDIC SURGERY

## 2023-09-14 PROCEDURE — 99213 OFFICE O/P EST LOW 20 MIN: CPT | Mod: PBBFAC,PO | Performed by: ORTHOPAEDIC SURGERY

## 2023-09-14 PROCEDURE — 99999 PR PBB SHADOW E&M-EST. PATIENT-LVL III: CPT | Mod: PBBFAC,,, | Performed by: ORTHOPAEDIC SURGERY

## 2023-09-14 PROCEDURE — 73564 X-RAY EXAM KNEE 4 OR MORE: CPT | Mod: 26,RT,, | Performed by: RADIOLOGY

## 2023-09-14 PROCEDURE — 73562 XR KNEE ORTHO RIGHT WITH FLEXION: ICD-10-PCS | Mod: 26,LT,, | Performed by: RADIOLOGY

## 2023-09-14 PROCEDURE — 73564 XR KNEE ORTHO RIGHT WITH FLEXION: ICD-10-PCS | Mod: 26,RT,, | Performed by: RADIOLOGY

## 2023-09-14 PROCEDURE — 73562 X-RAY EXAM OF KNEE 3: CPT | Mod: TC,PO,LT

## 2023-09-14 PROCEDURE — 99999 PR PBB SHADOW E&M-EST. PATIENT-LVL III: ICD-10-PCS | Mod: PBBFAC,,, | Performed by: ORTHOPAEDIC SURGERY

## 2023-09-14 PROCEDURE — 73562 X-RAY EXAM OF KNEE 3: CPT | Mod: 26,LT,, | Performed by: RADIOLOGY

## 2023-09-14 NOTE — PROGRESS NOTES
Past Medical History:   Diagnosis Date    Allergy     Arthritis     GERD (gastroesophageal reflux disease)     Ulcer        Past Surgical History:   Procedure Laterality Date    CHOLECYSTECTOMY      EYE SURGERY      HYSTERECTOMY      KNEE SURGERY      TONSILLECTOMY, ADENOIDECTOMY, BILATERAL MYRINGOTOMY AND TUBES      TUBAL LIGATION         Current Outpatient Medications   Medication Sig    BORON ORAL Take by mouth.     No current facility-administered medications for this visit.       Review of patient's allergies indicates:   Allergen Reactions    Amoxicillin Nausea Only     Cannot tolerate side effects    Erythromycin Nausea Only     Other reaction(s): Nausea    Oxycodone      Other reaction(s): Swelling  Other reaction(s): Flushing (skin)    Phenergan [promethazine] Other (See Comments)     Other reaction(s): Muscle pain  Muscle pain    Sulfa (sulfonamide antibiotics)     Doxycycline Nausea Only    Imitrex [sumatriptan succinate] Swelling and Palpitations     Other reaction(s): swelling  Other reaction(s): chest pains       Family History   Problem Relation Age of Onset    Heart failure Mother     Stroke Mother     Macular degeneration Mother     Hypertension Mother     Heart attack Father     Emphysema Father     Lung cancer Father     Heart attack Sister     Breast cancer Maternal Grandmother     Heart attack Maternal Grandmother     Blindness Maternal Grandmother     Cataracts Maternal Grandmother     Breast cancer Paternal Grandmother     Heart attack Paternal Grandmother     Blindness Paternal Grandmother     Cataracts Paternal Grandmother     Diabetes Paternal Grandmother     Amblyopia Neg Hx     Cancer Neg Hx     Glaucoma Neg Hx     Retinal detachment Neg Hx     Strabismus Neg Hx     Thyroid disease Neg Hx        Social History     Socioeconomic History    Marital status:    Tobacco Use    Smoking status: Never    Smokeless tobacco: Never   Substance and Sexual Activity    Alcohol use: No    Drug  use: No     Social Determinants of Health     Financial Resource Strain: Low Risk  (9/19/2022)    Overall Financial Resource Strain (CARDIA)     Difficulty of Paying Living Expenses: Not hard at all   Food Insecurity: No Food Insecurity (9/19/2022)    Hunger Vital Sign     Worried About Running Out of Food in the Last Year: Never true     Ran Out of Food in the Last Year: Never true   Transportation Needs: No Transportation Needs (9/19/2022)    PRAPARE - Transportation     Lack of Transportation (Medical): No     Lack of Transportation (Non-Medical): No   Physical Activity: Insufficiently Active (9/19/2022)    Exercise Vital Sign     Days of Exercise per Week: 2 days     Minutes of Exercise per Session: 20 min   Stress: No Stress Concern Present (9/19/2022)    [employer] [place] [employer] - Occupational Stress Questionnaire     Feeling of Stress : Not at all   Social Connections: Unknown (9/19/2022)    Social Connection and Isolation Panel [NHANES]     Frequency of Communication with Friends and Family: More than three times a week     Frequency of Social Gatherings with Friends and Family: Twice a week     Active Member of Clubs or Organizations: Yes     Attends Club or Organization Meetings: More than 4 times per year     Marital Status:    Housing Stability: Low Risk  (9/19/2022)    Housing Stability Vital Sign     Unable to Pay for Housing in the Last Year: No     Number of Places Lived in the Last Year: 2     Unstable Housing in the Last Year: No       Chief Complaint: No chief complaint on file.      History of present illness:  68-year-old female seen for right knee pain.  I saw her several years ago for the same knee and she had some chondral defects on the tibia with some early arthritis.  Patient was doing pretty well until about 3 months ago.  Started to work out in June.  Had a little pain along the medial aspect of her knees so she stopped for about a month.  She then tried again and  the pain came back again so she quit trying to exercise.  Pain is continued to worsen despite rest.  Has severe pain along the medial aspect of her knee particularly with weight-bearing.  Knee feels like it catches and locks on her now.  Has difficulty walking at all.  Pain is a 10/10 with weight-bearing.  She is tried ice, Tylenol, brace and Voltaren gel without any real relief.  Patient can not take NSAIDs because of a stomach ulcer.      Review of Systems:    Constitution: Negative for chills, fever, and sweats.  Negative for unexplained weight loss.    HENT:  Negative for headaches and blurry vision.    Cardiovascular:Negative for chest pain or irregular heart beat. Negative for hypertension.    Respiratory:  Negative for cough and shortness of breath.    Gastrointestinal: Negative for abdominal pain, heartburn, melena, nausea, and vomitting.    Genitourinary:  Negative bladder incontinence and dysuria.    Musculoskeletal:  See HPI    Neurological: Negative for numbness.    Psychiatric/Behavioral: Negative for depression.  The patient is not nervous/anxious.      Endocrine: Negative for polyuria    Hematologic/Lymphatic: Negative for bleeding problem.  Does not bruise/bleed easily.    Skin: Negative for poor would healing and rash      Physical Examination:    Vital Signs:  There were no vitals filed for this visit.    There is no height or weight on file to calculate BMI.    This a well-developed, well nourished patient in no acute distress.  They are alert and oriented and cooperative to examination.  Pt. walks without an antalgic gait.      Examination of the right knee shows no rashes or erythema. There are no masses ecchymosis or effusion. Patient has full range of motion from 0-130°. Patient is nontender to palpation over lateral joint line and markedly tender to palpation over the medial joint line.  Tender along the proximal medial tibia as well.  Patient has a - Lachman exam, - anterior drawer exam, and  - posterior drawer exam.  Positive medial Apley exam. Knee is stable to varus and valgus stress. 5 out of 5 motor strength. Palpable distal pulses. Intact light touch sensation. Negative Patellofemoral crepitus      X-rays:  X-rays of the right knee are ordered and review which show some mild to moderate medial narrowing     Assessment::  Right unstable medial meniscal tear with likely tibial insufficiency fracture    Plan:  I reviewed the findings with her and her  today.  I think she has a tibial insufficiency fracture which is giving her the severe pain likely as a result of a meniscus tear.  Recommended an MRI to evaluate both the meniscus as well as the status of the bone.  Put her on crutches today as well to protect her weight-bearing.  Follow-up after the MRI is completed.    All previous pertinent notes including ER visits, physical therapy visits, other orthopedic visits as well as other care for the same musculoskeletal problem were reviewed.  All pertinent lab values and previous imaging was reviewed pertinent to the current visit.    This note was created using [employer] voice recognition software that occasionally misinterpreted phrases or words.    Consult note is delivered via Epic messaging service.

## 2023-09-17 ENCOUNTER — HOSPITAL ENCOUNTER (OUTPATIENT)
Dept: RADIOLOGY | Facility: HOSPITAL | Age: 68
Discharge: HOME | End: 2023-09-17
Attending: ORTHOPAEDIC SURGERY
Payer: MEDICARE

## 2023-09-17 DIAGNOSIS — S83.241A TEAR OF MEDIAL MENISCUS OF RIGHT KNEE, CURRENT, UNSPECIFIED TEAR TYPE, INITIAL ENCOUNTER: ICD-10-CM

## 2023-09-17 PROCEDURE — 73721 MRI JNT OF LWR EXTRE W/O DYE: CPT | Mod: TC,RT

## 2023-09-17 PROCEDURE — 73721 MRI KNEE WITHOUT CONTRAST RIGHT: ICD-10-PCS | Mod: 26,RT,, | Performed by: RADIOLOGY

## 2023-09-17 PROCEDURE — 73721 MRI JNT OF LWR EXTRE W/O DYE: CPT | Mod: 26,RT,, | Performed by: RADIOLOGY

## 2023-09-18 ENCOUNTER — OFFICE VISIT (OUTPATIENT)
Dept: ORTHOPEDICS | Facility: CLINIC | Age: 68
End: 2023-09-18
Payer: MEDICARE

## 2023-09-18 VITALS — RESPIRATION RATE: 18 BRPM | BODY MASS INDEX: 33.86 KG/M2 | WEIGHT: 184 LBS | HEIGHT: 62 IN

## 2023-09-18 DIAGNOSIS — S83.241A TEAR OF MEDIAL MENISCUS OF RIGHT KNEE, CURRENT, UNSPECIFIED TEAR TYPE, INITIAL ENCOUNTER: Primary | ICD-10-CM

## 2023-09-18 DIAGNOSIS — M84.361D STRESS FRACTURE OF RIGHT TIBIA WITH ROUTINE HEALING, SUBSEQUENT ENCOUNTER: Primary | ICD-10-CM

## 2023-09-18 PROCEDURE — 99214 OFFICE O/P EST MOD 30 MIN: CPT | Mod: S$PBB,,, | Performed by: ORTHOPAEDIC SURGERY

## 2023-09-18 PROCEDURE — 99999 PR PBB SHADOW E&M-EST. PATIENT-LVL III: ICD-10-PCS | Mod: PBBFAC,,, | Performed by: ORTHOPAEDIC SURGERY

## 2023-09-18 PROCEDURE — 99999 PR PBB SHADOW E&M-EST. PATIENT-LVL III: CPT | Mod: PBBFAC,,, | Performed by: ORTHOPAEDIC SURGERY

## 2023-09-18 PROCEDURE — 99213 OFFICE O/P EST LOW 20 MIN: CPT | Mod: PBBFAC,PO | Performed by: ORTHOPAEDIC SURGERY

## 2023-09-18 PROCEDURE — 99214 PR OFFICE/OUTPT VISIT, EST, LEVL IV, 30-39 MIN: ICD-10-PCS | Mod: S$PBB,,, | Performed by: ORTHOPAEDIC SURGERY

## 2023-09-18 NOTE — PROGRESS NOTES
Past Medical History:   Diagnosis Date    Allergy     Arthritis     GERD (gastroesophageal reflux disease)     Ulcer        Past Surgical History:   Procedure Laterality Date    CHOLECYSTECTOMY      EYE SURGERY      HYSTERECTOMY      KNEE SURGERY      TONSILLECTOMY, ADENOIDECTOMY, BILATERAL MYRINGOTOMY AND TUBES      TUBAL LIGATION         Current Outpatient Medications   Medication Sig    BORON ORAL Take by mouth.     No current facility-administered medications for this visit.       Review of patient's allergies indicates:   Allergen Reactions    Amoxicillin Nausea Only     Cannot tolerate side effects    Erythromycin Nausea Only     Other reaction(s): Nausea    Oxycodone      Other reaction(s): Swelling  Other reaction(s): Flushing (skin)    Phenergan [promethazine] Other (See Comments)     Other reaction(s): Muscle pain  Muscle pain    Sulfa (sulfonamide antibiotics)     Doxycycline Nausea Only    Imitrex [sumatriptan succinate] Swelling and Palpitations     Other reaction(s): swelling  Other reaction(s): chest pains       Family History   Problem Relation Age of Onset    Heart failure Mother     Stroke Mother     Macular degeneration Mother     Hypertension Mother     Heart attack Father     Emphysema Father     Lung cancer Father     Heart attack Sister     Breast cancer Maternal Grandmother     Heart attack Maternal Grandmother     Blindness Maternal Grandmother     Cataracts Maternal Grandmother     Breast cancer Paternal Grandmother     Heart attack Paternal Grandmother     Blindness Paternal Grandmother     Cataracts Paternal Grandmother     Diabetes Paternal Grandmother     Amblyopia Neg Hx     Cancer Neg Hx     Glaucoma Neg Hx     Retinal detachment Neg Hx     Strabismus Neg Hx     Thyroid disease Neg Hx        Social History     Socioeconomic History    Marital status:    Tobacco Use    Smoking status: Never    Smokeless tobacco: Never   Substance and Sexual Activity    Alcohol use: No    Drug  use: No     Social Determinants of Health     Financial Resource Strain: Low Risk  (9/19/2022)    Overall Financial Resource Strain (CARDIA)     Difficulty of Paying Living Expenses: Not hard at all   Food Insecurity: No Food Insecurity (9/19/2022)    Hunger Vital Sign     Worried About Running Out of Food in the Last Year: Never true     Ran Out of Food in the Last Year: Never true   Transportation Needs: No Transportation Needs (9/19/2022)    PRAPARE - Transportation     Lack of Transportation (Medical): No     Lack of Transportation (Non-Medical): No   Physical Activity: Insufficiently Active (9/19/2022)    Exercise Vital Sign     Days of Exercise per Week: 2 days     Minutes of Exercise per Session: 20 min   Stress: No Stress Concern Present (9/19/2022)    [employer] - Occupational Stress Questionnaire     Feeling of Stress : Not at all   Social Connections: Unknown (9/19/2022)    Social Connection and Isolation Panel [NHANES]     Frequency of Communication with Friends and Family: More than three times a week     Frequency of Social Gatherings with Friends and Family: Twice a week     Active Member of Clubs or Organizations: Yes     Attends Club or Organization Meetings: More than 4 times per year     Marital Status:    Housing Stability: Low Risk  (9/19/2022)    Housing Stability Vital Sign     Unable to Pay for Housing in the Last Year: No     Number of Places Lived in the Last Year: 2     Unstable Housing in the Last Year: No       Chief Complaint: No chief complaint on file.      History of present illness:  68-year-old female seen for right knee pain.  I saw her several years ago for the same knee and she had some chondral defects on the tibia with some early arthritis.  Patient was doing pretty well until about 3 months ago.  Started to work out in June.  Had a little pain along the medial aspect of her knees so she stopped for about a month.  She then tried again and  the pain came back again so she quit trying to exercise.  Pain is continued to worsen despite rest.  Has severe pain along the medial aspect of her knee particularly with weight-bearing.  Knee feels like it catches and locks on her now.  Has difficulty walking at all.  Pain is a 10/10 with weight-bearing.  She is tried ice, Tylenol, brace and Voltaren gel without any real relief.  Patient can not take NSAIDs because of a stomach ulcer.  MRI confirmed a medial tibial stress reaction.      Review of Systems:    Constitution: Negative for chills, fever, and sweats.  Negative for unexplained weight loss.    HENT:  Negative for headaches and blurry vision.    Cardiovascular:Negative for chest pain or irregular heart beat. Negative for hypertension.    Respiratory:  Negative for cough and shortness of breath.    Gastrointestinal: Negative for abdominal pain, heartburn, melena, nausea, and vomitting.    Genitourinary:  Negative bladder incontinence and dysuria.    Musculoskeletal:  See HPI    Neurological: Negative for numbness.    Psychiatric/Behavioral: Negative for depression.  The patient is not nervous/anxious.      Endocrine: Negative for polyuria    Hematologic/Lymphatic: Negative for bleeding problem.  Does not bruise/bleed easily.    Skin: Negative for poor would healing and rash      Physical Examination:    Vital Signs:  There were no vitals filed for this visit.    There is no height or weight on file to calculate BMI.    This a well-developed, well nourished patient in no acute distress.  They are alert and oriented and cooperative to examination.  Pt. walks without an antalgic gait.      Examination of the right knee shows no rashes or erythema. There are no masses ecchymosis or effusion. Patient has full range of motion from 0-130°. Patient is nontender to palpation over lateral joint line and markedly tender to palpation over the medial joint line.  Tender along the proximal medial tibia as well.  Patient  has a - Lachman exam, - anterior drawer exam, and - posterior drawer exam.  Positive medial Apley exam. Knee is stable to varus and valgus stress. 5 out of 5 motor strength. Palpable distal pulses. Intact light touch sensation. Negative Patellofemoral crepitus      X-rays:  X-rays of the right knee are review which show some mild to moderate medial narrowing    MRI of the right knee is reviewed and interpreted:Moderate stress reaction versus osseous contusion of the anteromedial tibial plateau.  Probable chronic degenerative tear of the anterior horn of the medial meniscus.  Osteoarthritis, most severe within the medial compartment where a large region of full-thickness chondral loss is present.     Assessment::  Right tibial insufficiency fracture  Right knee osteoarthritis    Plan:  I reviewed the findings with her and her  today.  I think she has a tibial insufficiency fracture which is giving her the severe pain likely as a result of a meniscus tear.   Continue the crutches.  Recommended a medial  if she would like to avoid surgery.  Follow-up in a month.    All previous pertinent notes including ER visits, physical therapy visits, other orthopedic visits as well as other care for the same musculoskeletal problem were reviewed.  All pertinent lab values and previous imaging was reviewed pertinent to the current visit.    This note was created using [employer] voice recognition software that occasionally misinterpreted phrases or words.    Consult note is delivered via Epic messaging service.

## 2023-09-19 ENCOUNTER — PATIENT MESSAGE (OUTPATIENT)
Dept: ORTHOPEDICS | Facility: CLINIC | Age: 68
End: 2023-09-19
Payer: MEDICARE

## 2023-09-20 DIAGNOSIS — Z78.0 MENOPAUSE: ICD-10-CM

## 2023-09-22 ENCOUNTER — PATIENT MESSAGE (OUTPATIENT)
Dept: ORTHOPEDICS | Facility: CLINIC | Age: 68
End: 2023-09-22
Payer: MEDICARE

## 2023-09-26 ENCOUNTER — PATIENT MESSAGE (OUTPATIENT)
Dept: ORTHOPEDICS | Facility: CLINIC | Age: 68
End: 2023-09-26
Payer: MEDICARE

## 2023-10-19 ENCOUNTER — OFFICE VISIT (OUTPATIENT)
Dept: ORTHOPEDICS | Facility: CLINIC | Age: 68
End: 2023-10-19
Payer: MEDICARE

## 2023-10-19 DIAGNOSIS — M84.361D STRESS FRACTURE OF RIGHT TIBIA WITH ROUTINE HEALING, SUBSEQUENT ENCOUNTER: Primary | ICD-10-CM

## 2023-10-19 DIAGNOSIS — M17.10 ARTHRITIS OF KNEE: ICD-10-CM

## 2023-10-19 PROCEDURE — 99214 OFFICE O/P EST MOD 30 MIN: CPT | Mod: S$PBB,,, | Performed by: ORTHOPAEDIC SURGERY

## 2023-10-19 PROCEDURE — 99999 PR PBB SHADOW E&M-EST. PATIENT-LVL II: ICD-10-PCS | Mod: PBBFAC,,, | Performed by: ORTHOPAEDIC SURGERY

## 2023-10-19 PROCEDURE — 99214 PR OFFICE/OUTPT VISIT, EST, LEVL IV, 30-39 MIN: ICD-10-PCS | Mod: S$PBB,,, | Performed by: ORTHOPAEDIC SURGERY

## 2023-10-19 PROCEDURE — 99999 PR PBB SHADOW E&M-EST. PATIENT-LVL II: CPT | Mod: PBBFAC,,, | Performed by: ORTHOPAEDIC SURGERY

## 2023-10-19 PROCEDURE — 99212 OFFICE O/P EST SF 10 MIN: CPT | Mod: PBBFAC,PO | Performed by: ORTHOPAEDIC SURGERY

## 2023-10-19 NOTE — PROGRESS NOTES
Past Medical History:   Diagnosis Date    Allergy     Arthritis     GERD (gastroesophageal reflux disease)     Ulcer        Past Surgical History:   Procedure Laterality Date    CHOLECYSTECTOMY      EYE SURGERY      HYSTERECTOMY      KNEE SURGERY      TONSILLECTOMY, ADENOIDECTOMY, BILATERAL MYRINGOTOMY AND TUBES      TUBAL LIGATION         Current Outpatient Medications   Medication Sig    BORON ORAL Take by mouth.     No current facility-administered medications for this visit.       Review of patient's allergies indicates:   Allergen Reactions    Amoxicillin Nausea Only     Cannot tolerate side effects    Erythromycin Nausea Only     Other reaction(s): Nausea    Oxycodone      Other reaction(s): Swelling  Other reaction(s): Flushing (skin)    Phenergan [promethazine] Other (See Comments)     Other reaction(s): Muscle pain  Muscle pain    Sulfa (sulfonamide antibiotics)     Doxycycline Nausea Only    Imitrex [sumatriptan succinate] Swelling and Palpitations     Other reaction(s): swelling  Other reaction(s): chest pains       Family History   Problem Relation Age of Onset    Heart failure Mother     Stroke Mother     Macular degeneration Mother     Hypertension Mother     Heart attack Father     Emphysema Father     Lung cancer Father     Heart attack Sister     Breast cancer Maternal Grandmother     Heart attack Maternal Grandmother     Blindness Maternal Grandmother     Cataracts Maternal Grandmother     Breast cancer Paternal Grandmother     Heart attack Paternal Grandmother     Blindness Paternal Grandmother     Cataracts Paternal Grandmother     Diabetes Paternal Grandmother     Amblyopia Neg Hx     Cancer Neg Hx     Glaucoma Neg Hx     Retinal detachment Neg Hx     Strabismus Neg Hx     Thyroid disease Neg Hx        Social History     Socioeconomic History    Marital status:    Tobacco Use    Smoking status: Never    Smokeless tobacco: Never   Substance and Sexual Activity    Alcohol use: No    Drug  use: No     Social Determinants of Health     Financial Resource Strain: Low Risk  (9/19/2022)    Overall Financial Resource Strain (CARDIA)     Difficulty of Paying Living Expenses: Not hard at all   Food Insecurity: No Food Insecurity (9/19/2022)    Hunger Vital Sign     Worried About Running Out of Food in the Last Year: Never true     Ran Out of Food in the Last Year: Never true   Transportation Needs: No Transportation Needs (9/19/2022)    PRAPARE - Transportation     Lack of Transportation (Medical): No     Lack of Transportation (Non-Medical): No   Physical Activity: Insufficiently Active (9/19/2022)    Exercise Vital Sign     Days of Exercise per Week: 2 days     Minutes of Exercise per Session: 20 min   Stress: No Stress Concern Present (9/19/2022)    [employer] [place] [employer] - Occupational Stress Questionnaire     Feeling of Stress : Not at all   Social Connections: Unknown (9/19/2022)    Social Connection and Isolation Panel [NHANES]     Frequency of Communication with Friends and Family: More than three times a week     Frequency of Social Gatherings with Friends and Family: Twice a week     Active Member of Clubs or Organizations: Yes     Attends Club or Organization Meetings: More than 4 times per year     Marital Status:    Housing Stability: Low Risk  (9/19/2022)    Housing Stability Vital Sign     Unable to Pay for Housing in the Last Year: No     Number of Places Lived in the Last Year: 2     Unstable Housing in the Last Year: No       Chief Complaint: No chief complaint on file.      History of present illness:  68-year-old female seen for right knee pain.  I saw her several years ago for the same knee and she had some chondral defects on the tibia with some early arthritis.  Patient was doing pretty well until about 3 months ago.  Started to work out in June.  Had a little pain along the medial aspect of her knees so she stopped for about a month.  She then tried again and  the pain came back again so she quit trying to exercise.  She is tried ice, Tylenol, brace and Voltaren gel without any real relief.  Patient can not take NSAIDs because of a stomach ulcer.  MRI confirmed a medial tibial stress reaction.  She is been wearing the medial  brace and is doing better.  Still has some pain and swelling after prolonged activity.      Review of Systems:  Musculoskeletal:  See HPI        Physical Examination:    Vital Signs:  There were no vitals filed for this visit.    There is no height or weight on file to calculate BMI.    This a well-developed, well nourished patient in no acute distress.  They are alert and oriented and cooperative to examination.  Pt. walks without an antalgic gait.      Examination of the right knee shows no rashes or erythema. There are no masses ecchymosis or effusion. Patient has full range of motion from 0-130°. Patient is nontender to palpation over lateral joint line and moderately tender to palpation over the medial joint line.  Tender along the proximal medial tibia as well.  Knee is stable to varus and valgus stress. 5 out of 5 motor strength. Palpable distal pulses. Intact light touch sensation. Negative Patellofemoral crepitus      X-rays:  X-rays of the right knee are review which show some mild to moderate medial narrowing    MRI of the right knee is reviewed and interpreted:Moderate stress reaction versus osseous contusion of the anteromedial tibial plateau.  Probable chronic degenerative tear of the anterior horn of the medial meniscus.  Osteoarthritis, most severe within the medial compartment where a large region of full-thickness chondral loss is present.     Assessment::  Right tibial insufficiency fracture  Right knee osteoarthritis    Plan:  I reviewed the findings with her and her  today.  Continue the  brace for more strenuous activity.  We talked about knee replacement and she would like to wait till the start of the  summer to do it.  I will see her back in 3 months to see how she is doing.      All previous pertinent notes including ER visits, physical therapy visits, other orthopedic visits as well as other care for the same musculoskeletal problem were reviewed.  All pertinent lab values and previous imaging was reviewed pertinent to the current visit.    This note was created using [employer] voice recognition software that occasionally misinterpreted phrases or words.    Consult note is delivered via Epic messaging service.

## 2024-01-25 ENCOUNTER — TELEPHONE (OUTPATIENT)
Dept: FAMILY MEDICINE | Facility: CLINIC | Age: 69
End: 2024-01-25
Payer: MEDICARE

## 2024-01-25 ENCOUNTER — OFFICE VISIT (OUTPATIENT)
Dept: ORTHOPEDICS | Facility: CLINIC | Age: 69
End: 2024-01-25
Payer: MEDICARE

## 2024-01-25 VITALS — WEIGHT: 164 LBS | HEIGHT: 62 IN | BODY MASS INDEX: 30.18 KG/M2

## 2024-01-25 DIAGNOSIS — M17.10 ARTHRITIS OF KNEE: Primary | ICD-10-CM

## 2024-01-25 PROCEDURE — 99214 OFFICE O/P EST MOD 30 MIN: CPT | Mod: 57,S$PBB,, | Performed by: ORTHOPAEDIC SURGERY

## 2024-01-25 PROCEDURE — 99213 OFFICE O/P EST LOW 20 MIN: CPT | Mod: PBBFAC,PO | Performed by: ORTHOPAEDIC SURGERY

## 2024-01-25 PROCEDURE — 99999 PR PBB SHADOW E&M-EST. PATIENT-LVL III: CPT | Mod: PBBFAC,,, | Performed by: ORTHOPAEDIC SURGERY

## 2024-01-25 NOTE — TELEPHONE ENCOUNTER
Please give a approximate date of surgery The pre-op needs to 30 days We can get her in as early as tomorrow with a provider

## 2024-01-25 NOTE — PROGRESS NOTES
Past Medical History:   Diagnosis Date    Allergy     Arthritis     GERD (gastroesophageal reflux disease)     Ulcer        Past Surgical History:   Procedure Laterality Date    CHOLECYSTECTOMY      EYE SURGERY      HYSTERECTOMY      KNEE SURGERY      TONSILLECTOMY, ADENOIDECTOMY, BILATERAL MYRINGOTOMY AND TUBES      TUBAL LIGATION         Current Outpatient Medications   Medication Sig    BORON ORAL Take by mouth.     No current facility-administered medications for this visit.       Review of patient's allergies indicates:   Allergen Reactions    Amoxicillin Nausea Only     Cannot tolerate side effects    Erythromycin Nausea Only     Other reaction(s): Nausea    Oxycodone      Other reaction(s): Swelling  Other reaction(s): Flushing (skin)    Phenergan [promethazine] Other (See Comments)     Other reaction(s): Muscle pain  Muscle pain    Sulfa (sulfonamide antibiotics)     Doxycycline Nausea Only    Imitrex [sumatriptan succinate] Swelling and Palpitations     Other reaction(s): swelling  Other reaction(s): chest pains       Family History   Problem Relation Age of Onset    Heart failure Mother     Stroke Mother     Macular degeneration Mother     Hypertension Mother     Heart attack Father     Emphysema Father     Lung cancer Father     Heart attack Sister     Breast cancer Maternal Grandmother     Heart attack Maternal Grandmother     Blindness Maternal Grandmother     Cataracts Maternal Grandmother     Breast cancer Paternal Grandmother     Heart attack Paternal Grandmother     Blindness Paternal Grandmother     Cataracts Paternal Grandmother     Diabetes Paternal Grandmother     Amblyopia Neg Hx     Cancer Neg Hx     Glaucoma Neg Hx     Retinal detachment Neg Hx     Strabismus Neg Hx     Thyroid disease Neg Hx        Social History     Socioeconomic History    Marital status:    Tobacco Use    Smoking status: Never    Smokeless tobacco: Never   Substance and Sexual Activity    Alcohol use: No    Drug  use: No     Social Determinants of Health     Financial Resource Strain: Low Risk  (9/19/2022)    Overall Financial Resource Strain (CARDIA)     Difficulty of Paying Living Expenses: Not hard at all   Food Insecurity: No Food Insecurity (9/19/2022)    Hunger Vital Sign     Worried About Running Out of Food in the Last Year: Never true     Ran Out of Food in the Last Year: Never true   Transportation Needs: No Transportation Needs (9/19/2022)    PRAPARE - Transportation     Lack of Transportation (Medical): No     Lack of Transportation (Non-Medical): No   Physical Activity: Insufficiently Active (9/19/2022)    Exercise Vital Sign     Days of Exercise per Week: 2 days     Minutes of Exercise per Session: 20 min   Stress: No Stress Concern Present (9/19/2022)    [employer] - Occupational Stress Questionnaire     Feeling of Stress : Not at all   Social Connections: Unknown (9/19/2022)    Social Connection and Isolation Panel [NHANES]     Frequency of Communication with Friends and Family: More than three times a week     Frequency of Social Gatherings with Friends and Family: Twice a week     Active Member of Clubs or Organizations: Yes     Attends Club or Organization Meetings: More than 4 times per year     Marital Status:    Housing Stability: Low Risk  (9/19/2022)    Housing Stability Vital Sign     Unable to Pay for Housing in the Last Year: No     Number of Places Lived in the Last Year: 2     Unstable Housing in the Last Year: No       Chief Complaint:   Chief Complaint   Patient presents with    Right Knee - Pain       History of present illness:  68-year-old female seen for right knee pain.  I saw her several years ago for the same knee and she had some chondral defects on the tibia with some early arthritis.  Patient was doing pretty well until about 3 months ago.  Started to work out in June.  Had a little pain along the medial aspect of her knees so she stopped for about  a month.  She then tried again and the pain came back again so she quit trying to exercise.  She is tried ice, Tylenol, brace and Voltaren gel without any real relief.  Patient can not take NSAIDs because of a stomach ulcer.  MRI confirmed a medial tibial stress reaction.  She is been wearing the medial  brace and is doing better.  Still has some pain and swelling after prolonged activity.      Review of Systems:  Musculoskeletal:  See HPI        Physical Examination:    Vital Signs:  There were no vitals filed for this visit.    Body mass index is 33.65 kg/m².    This a well-developed, well nourished patient in no acute distress.  They are alert and oriented and cooperative to examination.  Pt. walks without an antalgic gait.      Examination of the right knee shows no rashes or erythema. There are no masses ecchymosis or effusion. Patient has full range of motion from 0-130°. Patient is nontender to palpation over lateral joint line and moderately tender to palpation over the medial joint line.  Tender along the proximal medial tibia as well.  Knee is stable to varus and valgus stress. 5 out of 5 motor strength. Palpable distal pulses. Intact light touch sensation. Negative Patellofemoral crepitus    Heart is regular rate without obvious murmurs   Normal respiratory effort without audible wheezing  Abdomen is soft and nontender       X-rays:  X-rays of the right knee are review which show medial narrowing    MRI of the right knee is reviewed and interpreted:Moderate stress reaction versus osseous contusion of the anteromedial tibial plateau.  Probable chronic degenerative tear of the anterior horn of the medial meniscus.  Osteoarthritis, most severe within the medial compartment where a large region of full-thickness chondral loss is present.     Assessment::  Right tibial insufficiency fracture  Right knee osteoarthritis    Plan:  I reviewed the findings with her and her  today.  We talked about  knee replacement and she would like to wait till the start of the summer to do it.  Plan is for right robotic assisted total knee arthroplasty.  Patient would like to be asleep before the nerve block is performed.  I agreed to inject her knee prior to a cruise in a month.  I will see her back in 3 weeks for the injection.  Risks, benefits, and alternatives to the procedure were explained to the patient including but not limited to damage to nerves, arteries, blood vessels, bones, tendons, ligaments, stiffness, instability, infection, permanent limb dysfunction, DVT, PE, as well as general anesthetic complications including seizure, stroke, heart attack and even death. The patient understood these risks and wished to proceed and signed the informed consent.         All previous pertinent notes including ER visits, physical therapy visits, other orthopedic visits as well as other care for the same musculoskeletal problem were reviewed.  All pertinent lab values and previous imaging was reviewed pertinent to the current visit.    This note was created using [employer] voice recognition software that occasionally misinterpreted phrases or words.    Consult note is delivered via Epic messaging service.

## 2024-01-25 NOTE — TELEPHONE ENCOUNTER
----- Message from Romeo Delarosa sent at 1/25/2024 11:20 AM CST -----  Regarding: pre op clearance  Patient was seen by Dr. Orellana today and consented for right total knee arthroplasty.  Please advise on pre op medical clearance for this patient.      Thank you,     Asa

## 2024-02-19 ENCOUNTER — OFFICE VISIT (OUTPATIENT)
Dept: ORTHOPEDICS | Facility: CLINIC | Age: 69
End: 2024-02-19
Payer: MEDICARE

## 2024-02-19 VITALS — WEIGHT: 164 LBS | BODY MASS INDEX: 29.06 KG/M2 | HEIGHT: 63 IN

## 2024-02-19 DIAGNOSIS — M17.10 ARTHRITIS OF KNEE: Primary | ICD-10-CM

## 2024-02-19 DIAGNOSIS — M17.11 PRIMARY OSTEOARTHRITIS OF RIGHT KNEE: ICD-10-CM

## 2024-02-19 PROCEDURE — 99213 OFFICE O/P EST LOW 20 MIN: CPT | Mod: PBBFAC,PO,25 | Performed by: ORTHOPAEDIC SURGERY

## 2024-02-19 PROCEDURE — 20610 DRAIN/INJ JOINT/BURSA W/O US: CPT | Mod: PBBFAC,PO | Performed by: ORTHOPAEDIC SURGERY

## 2024-02-19 PROCEDURE — 99999 PR PBB SHADOW E&M-EST. PATIENT-LVL III: CPT | Mod: PBBFAC,,, | Performed by: ORTHOPAEDIC SURGERY

## 2024-02-19 PROCEDURE — 99999PBSHW PR PBB SHADOW TECHNICAL ONLY FILED TO HB: Mod: PBBFAC,,,

## 2024-02-19 PROCEDURE — 99214 OFFICE O/P EST MOD 30 MIN: CPT | Mod: 25,S$PBB,, | Performed by: ORTHOPAEDIC SURGERY

## 2024-02-19 RX ORDER — TRIAMCINOLONE ACETONIDE 40 MG/ML
40 INJECTION, SUSPENSION INTRA-ARTICULAR; INTRAMUSCULAR
Status: DISCONTINUED | OUTPATIENT
Start: 2024-02-19 | End: 2024-02-19 | Stop reason: HOSPADM

## 2024-02-19 RX ADMIN — TRIAMCINOLONE ACETONIDE 40 MG: 40 INJECTION, SUSPENSION INTRA-ARTICULAR; INTRAMUSCULAR at 10:02

## 2024-02-19 NOTE — PROCEDURES
Large Joint Aspiration/Injection: R knee    Date/Time: 2/19/2024 10:30 AM    Performed by: Rod Orellana MD  Authorized by: Rod Orellana MD    Consent Done?:  Yes (Verbal)  Indications:  Pain  Site marked: the procedure site was marked    Timeout: prior to procedure the correct patient, procedure, and site was verified    Local anesthetic: Ropivicaine.  Anesthetic total (ml):  3      Details:  Needle Size:  20 G  Approach:  Anterolateral  Location:  Knee  Site:  R knee  Medications:  40 mg triamcinolone acetonide 40 mg/mL  Patient tolerance:  Patient tolerated the procedure well with no immediate complications

## 2024-02-19 NOTE — PROGRESS NOTES
Past Medical History:   Diagnosis Date    Allergy     Arthritis     GERD (gastroesophageal reflux disease)     Ulcer        Past Surgical History:   Procedure Laterality Date    CHOLECYSTECTOMY      EYE SURGERY      HYSTERECTOMY      KNEE SURGERY      TONSILLECTOMY, ADENOIDECTOMY, BILATERAL MYRINGOTOMY AND TUBES      TUBAL LIGATION         Current Outpatient Medications   Medication Sig    BORON ORAL Take by mouth.     No current facility-administered medications for this visit.       Review of patient's allergies indicates:   Allergen Reactions    Amoxicillin Nausea Only     Cannot tolerate side effects    Erythromycin Nausea Only     Other reaction(s): Nausea    Oxycodone      Other reaction(s): Swelling  Other reaction(s): Flushing (skin)    Phenergan [promethazine] Other (See Comments)     Other reaction(s): Muscle pain  Muscle pain    Sulfa (sulfonamide antibiotics)     Doxycycline Nausea Only    Imitrex [sumatriptan succinate] Swelling and Palpitations     Other reaction(s): swelling  Other reaction(s): chest pains       Family History   Problem Relation Age of Onset    Heart failure Mother     Stroke Mother     Macular degeneration Mother     Hypertension Mother     Heart attack Father     Emphysema Father     Lung cancer Father     Heart attack Sister     Breast cancer Maternal Grandmother     Heart attack Maternal Grandmother     Blindness Maternal Grandmother     Cataracts Maternal Grandmother     Breast cancer Paternal Grandmother     Heart attack Paternal Grandmother     Blindness Paternal Grandmother     Cataracts Paternal Grandmother     Diabetes Paternal Grandmother     Amblyopia Neg Hx     Cancer Neg Hx     Glaucoma Neg Hx     Retinal detachment Neg Hx     Strabismus Neg Hx     Thyroid disease Neg Hx        Social History     Socioeconomic History    Marital status:    Tobacco Use    Smoking status: Never    Smokeless tobacco: Never   Substance and Sexual Activity    Alcohol use: No    Drug  use: No     Social Determinants of Health     Financial Resource Strain: Low Risk  (9/19/2022)    Overall Financial Resource Strain (CARDIA)     Difficulty of Paying Living Expenses: Not hard at all   Food Insecurity: No Food Insecurity (9/19/2022)    Hunger Vital Sign     Worried About Running Out of Food in the Last Year: Never true     Ran Out of Food in the Last Year: Never true   Transportation Needs: No Transportation Needs (9/19/2022)    PRAPARE - Transportation     Lack of Transportation (Medical): No     Lack of Transportation (Non-Medical): No   Physical Activity: Insufficiently Active (9/19/2022)    Exercise Vital Sign     Days of Exercise per Week: 2 days     Minutes of Exercise per Session: 20 min   Stress: No Stress Concern Present (9/19/2022)    [employer] - Occupational Stress Questionnaire     Feeling of Stress : Not at all   Social Connections: Unknown (9/19/2022)    Social Connection and Isolation Panel [NHANES]     Frequency of Communication with Friends and Family: More than three times a week     Frequency of Social Gatherings with Friends and Family: Twice a week     Active Member of Clubs or Organizations: Yes     Attends Club or Organization Meetings: More than 4 times per year     Marital Status:    Housing Stability: Low Risk  (9/19/2022)    Housing Stability Vital Sign     Unable to Pay for Housing in the Last Year: No     Number of Places Lived in the Last Year: 2     Unstable Housing in the Last Year: No       Chief Complaint:   Chief Complaint   Patient presents with    Right Knee - Pain       History of present illness:  68-year-old female seen for right knee pain.  I saw her several years ago for the same knee and she had some chondral defects on the tibia with some early arthritis.  Patient was doing pretty well until about 3 months ago.  Started to work out in June.  Had a little pain along the medial aspect of her knees so she stopped for about  a month.  She then tried again and the pain came back again so she quit trying to exercise.  She is tried ice, Tylenol, brace and Voltaren gel without any real relief.  Patient can not take NSAIDs because of a stomach ulcer.  MRI confirmed a medial tibial stress reaction.  She is been wearing the medial  brace and is doing better.  Still has some pain and swelling after prolonged activity.      Review of Systems:  Musculoskeletal:  See HPI        Physical Examination:    Vital Signs:  There were no vitals filed for this visit.    Body mass index is 29.05 kg/m².    This a well-developed, well nourished patient in no acute distress.  They are alert and oriented and cooperative to examination.  Pt. walks without an antalgic gait.      Examination of the right knee shows no rashes or erythema. There are no masses ecchymosis or effusion. Patient has full range of motion from 0-130°. Patient is nontender to palpation over lateral joint line and moderately tender to palpation over the medial joint line.  Tender along the proximal medial tibia as well.  Knee is stable to varus and valgus stress. 5 out of 5 motor strength. Palpable distal pulses. Intact light touch sensation. Negative Patellofemoral crepitus    Heart is regular rate without obvious murmurs   Normal respiratory effort without audible wheezing  Abdomen is soft and nontender       X-rays:  X-rays of the right knee are review which show medial narrowing    MRI of the right knee is reviewed and interpreted:Moderate stress reaction versus osseous contusion of the anteromedial tibial plateau.  Probable chronic degenerative tear of the anterior horn of the medial meniscus.  Osteoarthritis, most severe within the medial compartment where a large region of full-thickness chondral loss is present.     Assessment::  Right tibial insufficiency fracture  Right knee osteoarthritis    Plan:  I reviewed the findings with her and her  today.  I agreed to inject  her right knee today before her cruise. We talked about knee replacement and she would like to wait till the start of the summer to do it.  Plan is for right robotic assisted total knee arthroplasty.  Patient would like to be asleep before the nerve block is performed.  I agreed to inject her knee prior to a cruise in a month.   Risks, benefits, and alternatives to the procedure were explained to the patient including but not limited to damage to nerves, arteries, blood vessels, bones, tendons, ligaments, stiffness, instability, infection, permanent limb dysfunction, DVT, PE, as well as general anesthetic complications including seizure, stroke, heart attack and even death. The patient understood these risks and wished to proceed and signed the informed consent.     The mobility limitation cannot be sufficiently resolved by the use of a cane. Patient's functional mobility deficit can be sufficiently resolved with the use of a (Rolling Walker or Walker). Patient's mobility limitation significantly impairs their ability to participate in one of more activities of daily living. The use of a (RW or Walker) will significantly improve the patient's ability to participate in MRADLS and the patient will use it on regular basis in the home.    All previous pertinent notes including ER visits, physical therapy visits, other orthopedic visits as well as other care for the same musculoskeletal problem were reviewed.  All pertinent lab values and previous imaging was reviewed pertinent to the current visit.    This note was created using [employer] voice recognition software that occasionally misinterpreted phrases or words.    Consult note is delivered via Epic messaging service.

## 2024-02-21 ENCOUNTER — HOSPITAL ENCOUNTER (EMERGENCY)
Facility: HOSPITAL | Age: 69
Discharge: HOME | End: 2024-02-21
Attending: EMERGENCY MEDICINE
Payer: MEDICARE

## 2024-02-21 ENCOUNTER — TELEPHONE (OUTPATIENT)
Dept: ORTHOPEDICS | Facility: CLINIC | Age: 69
End: 2024-02-21
Payer: MEDICARE

## 2024-02-21 ENCOUNTER — NURSE TRIAGE (OUTPATIENT)
Dept: ADMINISTRATIVE | Facility: CLINIC | Age: 69
End: 2024-02-21
Payer: MEDICARE

## 2024-02-21 VITALS
RESPIRATION RATE: 16 BRPM | HEART RATE: 96 BPM | BODY MASS INDEX: 28.7 KG/M2 | SYSTOLIC BLOOD PRESSURE: 229 MMHG | TEMPERATURE: 98 F | OXYGEN SATURATION: 100 % | DIASTOLIC BLOOD PRESSURE: 113 MMHG | WEIGHT: 162.06 LBS

## 2024-02-21 DIAGNOSIS — T78.40XA ALLERGIC REACTION, INITIAL ENCOUNTER: ICD-10-CM

## 2024-02-21 DIAGNOSIS — R21 RASH AND NONSPECIFIC SKIN ERUPTION: Primary | ICD-10-CM

## 2024-02-21 LAB
ALBUMIN SERPL BCP-MCNC: 4.2 G/DL (ref 3.5–5.2)
ALP SERPL-CCNC: 88 U/L (ref 55–135)
ALT SERPL W/O P-5'-P-CCNC: 12 U/L (ref 10–44)
ANION GAP SERPL CALC-SCNC: 12 MMOL/L (ref 8–16)
AST SERPL-CCNC: 15 U/L (ref 10–40)
BASOPHILS # BLD AUTO: 0.02 K/UL (ref 0–0.2)
BASOPHILS NFR BLD: 0.1 % (ref 0–1.9)
BILIRUB SERPL-MCNC: 0.4 MG/DL (ref 0.1–1)
BUN SERPL-MCNC: 18 MG/DL (ref 8–23)
CALCIUM SERPL-MCNC: 9.3 MG/DL (ref 8.7–10.5)
CHLORIDE SERPL-SCNC: 110 MMOL/L (ref 95–110)
CK SERPL-CCNC: 40 U/L (ref 20–180)
CO2 SERPL-SCNC: 21 MMOL/L (ref 23–29)
CREAT SERPL-MCNC: 0.8 MG/DL (ref 0.5–1.4)
CRP SERPL-MCNC: 2.1 MG/L (ref 0–8.2)
DIFFERENTIAL METHOD BLD: ABNORMAL
EOSINOPHIL # BLD AUTO: 0.1 K/UL (ref 0–0.5)
EOSINOPHIL NFR BLD: 0.3 % (ref 0–8)
ERYTHROCYTE [DISTWIDTH] IN BLOOD BY AUTOMATED COUNT: 13 % (ref 11.5–14.5)
ERYTHROCYTE [SEDIMENTATION RATE] IN BLOOD BY WESTERGREN METHOD: 8 MM/HR (ref 0–20)
EST. GFR  (NO RACE VARIABLE): >60 ML/MIN/1.73 M^2
GLUCOSE SERPL-MCNC: 100 MG/DL (ref 70–110)
HCT VFR BLD AUTO: 44.5 % (ref 37–48.5)
HGB BLD-MCNC: 14.7 G/DL (ref 12–16)
IMM GRANULOCYTES # BLD AUTO: 0.03 K/UL (ref 0–0.04)
IMM GRANULOCYTES NFR BLD AUTO: 0.2 % (ref 0–0.5)
LYMPHOCYTES # BLD AUTO: 4.1 K/UL (ref 1–4.8)
LYMPHOCYTES NFR BLD: 28 % (ref 18–48)
MCH RBC QN AUTO: 28.5 PG (ref 27–31)
MCHC RBC AUTO-ENTMCNC: 33 G/DL (ref 32–36)
MCV RBC AUTO: 86 FL (ref 82–98)
MONOCYTES # BLD AUTO: 0.9 K/UL (ref 0.3–1)
MONOCYTES NFR BLD: 5.9 % (ref 4–15)
NEUTROPHILS # BLD AUTO: 9.5 K/UL (ref 1.8–7.7)
NEUTROPHILS NFR BLD: 65.5 % (ref 38–73)
NRBC BLD-RTO: 0 /100 WBC
PLATELET # BLD AUTO: 351 K/UL (ref 150–450)
PMV BLD AUTO: 10.2 FL (ref 9.2–12.9)
POTASSIUM SERPL-SCNC: 3.9 MMOL/L (ref 3.5–5.1)
PROT SERPL-MCNC: 7.1 G/DL (ref 6–8.4)
RBC # BLD AUTO: 5.16 M/UL (ref 4–5.4)
SODIUM SERPL-SCNC: 143 MMOL/L (ref 136–145)
TROPONIN I SERPL DL<=0.01 NG/ML-MCNC: 0.01 NG/ML (ref 0–0.03)
WBC # BLD AUTO: 14.56 K/UL (ref 3.9–12.7)

## 2024-02-21 PROCEDURE — 85025 COMPLETE CBC W/AUTO DIFF WBC: CPT | Performed by: EMERGENCY MEDICINE

## 2024-02-21 PROCEDURE — 99284 EMERGENCY DEPT VISIT MOD MDM: CPT | Mod: 25

## 2024-02-21 PROCEDURE — 84484 ASSAY OF TROPONIN QUANT: CPT | Performed by: EMERGENCY MEDICINE

## 2024-02-21 PROCEDURE — 96374 THER/PROPH/DIAG INJ IV PUSH: CPT

## 2024-02-21 PROCEDURE — 85651 RBC SED RATE NONAUTOMATED: CPT | Performed by: EMERGENCY MEDICINE

## 2024-02-21 PROCEDURE — 86140 C-REACTIVE PROTEIN: CPT | Performed by: EMERGENCY MEDICINE

## 2024-02-21 PROCEDURE — 80053 COMPREHEN METABOLIC PANEL: CPT | Performed by: EMERGENCY MEDICINE

## 2024-02-21 PROCEDURE — 96375 TX/PRO/DX INJ NEW DRUG ADDON: CPT

## 2024-02-21 PROCEDURE — 63600175 PHARM REV CODE 636 W HCPCS: Performed by: EMERGENCY MEDICINE

## 2024-02-21 PROCEDURE — 36415 COLL VENOUS BLD VENIPUNCTURE: CPT | Performed by: EMERGENCY MEDICINE

## 2024-02-21 PROCEDURE — 82550 ASSAY OF CK (CPK): CPT | Performed by: EMERGENCY MEDICINE

## 2024-02-21 PROCEDURE — 25000003 PHARM REV CODE 250: Performed by: EMERGENCY MEDICINE

## 2024-02-21 RX ORDER — VITAMIN E/VITAMINS A AND D
1 CREAM (GRAM) TOPICAL 3 TIMES DAILY
Qty: 50 G | Refills: 0 | Status: SHIPPED | OUTPATIENT
Start: 2024-02-21 | End: 2024-05-20

## 2024-02-21 RX ORDER — PREDNISONE 20 MG/1
40 TABLET ORAL DAILY
Qty: 10 TABLET | Refills: 0 | Status: SHIPPED | OUTPATIENT
Start: 2024-02-21 | End: 2024-02-26

## 2024-02-21 RX ORDER — KETOROLAC TROMETHAMINE 30 MG/ML
15 INJECTION, SOLUTION INTRAMUSCULAR; INTRAVENOUS
Status: DISCONTINUED | OUTPATIENT
Start: 2024-02-21 | End: 2024-02-21 | Stop reason: HOSPADM

## 2024-02-21 RX ORDER — BACITRACIN ZINC 500 UNIT/G
OINTMENT (GRAM) TOPICAL 2 TIMES DAILY
Qty: 30 G | Refills: 0 | Status: SHIPPED | OUTPATIENT
Start: 2024-02-21 | End: 2024-05-20

## 2024-02-21 RX ORDER — METHYLPREDNISOLONE SOD SUCC 125 MG
125 VIAL (EA) INJECTION
Status: COMPLETED | OUTPATIENT
Start: 2024-02-21 | End: 2024-02-21

## 2024-02-21 RX ORDER — DIPHENHYDRAMINE HYDROCHLORIDE 50 MG/ML
25 INJECTION INTRAMUSCULAR; INTRAVENOUS
Status: COMPLETED | OUTPATIENT
Start: 2024-02-21 | End: 2024-02-21

## 2024-02-21 RX ORDER — FAMOTIDINE 10 MG/ML
20 INJECTION INTRAVENOUS
Status: COMPLETED | OUTPATIENT
Start: 2024-02-21 | End: 2024-02-21

## 2024-02-21 RX ADMIN — BENZOCAINE AND LEVOMENTHOL: 200; 5 SPRAY TOPICAL at 04:02

## 2024-02-21 RX ADMIN — METHYLPREDNISOLONE SODIUM SUCCINATE 125 MG: 125 INJECTION, POWDER, FOR SOLUTION INTRAMUSCULAR; INTRAVENOUS at 02:02

## 2024-02-21 RX ADMIN — DIPHENHYDRAMINE HYDROCHLORIDE 25 MG: 50 INJECTION INTRAMUSCULAR; INTRAVENOUS at 02:02

## 2024-02-21 RX ADMIN — FAMOTIDINE 20 MG: 10 INJECTION, SOLUTION INTRAVENOUS at 02:02

## 2024-02-21 NOTE — ED PROVIDER NOTES
Encounter Date: 2/21/2024       History     Chief Complaint   Patient presents with    Rash     Face red and feels like it is on fire. This started last night. Injection in knee on Monday. Thinks may be having a reaction to the med     68-year-old female past medical history of GERD presents secondary to possible allergic reaction.  Patient states she had a knee injection in 2 days later developed flushing to her face that became unbearable and she decided to come to emergency room tonight for further evaluation.  She states she is noticed discoloration to her face with no radiation but it burns as if it is on fire.  patient denies any nausea, vomiting, chest pain, shortness of breath associated.  Patient is otherwise stable and has no other complaints.      Review of patient's allergies indicates:   Allergen Reactions    Amoxicillin Nausea Only     Cannot tolerate side effects    Erythromycin Nausea Only     Other reaction(s): Nausea    Oxycodone      Other reaction(s): Swelling  Other reaction(s): Flushing (skin)    Phenergan [promethazine] Other (See Comments)     Other reaction(s): Muscle pain  Muscle pain    Sulfa (sulfonamide antibiotics)     Doxycycline Nausea Only    Imitrex [sumatriptan succinate] Swelling and Palpitations     Other reaction(s): swelling  Other reaction(s): chest pains     Past Medical History:   Diagnosis Date    Allergy     Arthritis     GERD (gastroesophageal reflux disease)     Ulcer      Past Surgical History:   Procedure Laterality Date    CHOLECYSTECTOMY      EYE SURGERY      HYSTERECTOMY      KNEE SURGERY      TONSILLECTOMY, ADENOIDECTOMY, BILATERAL MYRINGOTOMY AND TUBES      TUBAL LIGATION       Family History   Problem Relation Age of Onset    Heart failure Mother     Stroke Mother     Macular degeneration Mother     Hypertension Mother     Heart attack Father     Emphysema Father     Lung cancer Father     Heart attack Sister     Breast cancer Maternal Grandmother     Heart  attack Maternal Grandmother     Blindness Maternal Grandmother     Cataracts Maternal Grandmother     Breast cancer Paternal Grandmother     Heart attack Paternal Grandmother     Blindness Paternal Grandmother     Cataracts Paternal Grandmother     Diabetes Paternal Grandmother     Amblyopia Neg Hx     Cancer Neg Hx     Glaucoma Neg Hx     Retinal detachment Neg Hx     Strabismus Neg Hx     Thyroid disease Neg Hx      Social History     Tobacco Use    Smoking status: Never    Smokeless tobacco: Never   Substance Use Topics    Alcohol use: No    Drug use: No     Review of Systems   Skin:  Positive for color change and rash.   All other systems reviewed and are negative.      Physical Exam     Initial Vitals [02/21/24 0130]   BP Pulse Resp Temp SpO2   (!) 229/113 96 16 98.2 °F (36.8 °C) 100 %      MAP       --         Physical Exam    Nursing note and vitals reviewed.  Constitutional: She appears well-developed and well-nourished. She appears distressed.   HENT:   Head: Normocephalic and atraumatic.   Mouth/Throat: Oropharynx is clear and moist.   Eyes: Conjunctivae and EOM are normal. Pupils are equal, round, and reactive to light.   Neck: No tracheal deviation present. No JVD present.   Normal range of motion.  Cardiovascular:  Normal rate, regular rhythm, normal heart sounds and intact distal pulses.     Exam reveals no gallop and no friction rub.       No murmur heard.  Pulmonary/Chest: Breath sounds normal. No respiratory distress. She has no wheezes. She exhibits no tenderness.   Abdominal: Abdomen is soft. Bowel sounds are normal. She exhibits no distension. There is no abdominal tenderness. There is no rebound and no guarding.   Musculoskeletal:         General: No tenderness or edema. Normal range of motion.      Cervical back: Normal range of motion.     Neurological: She is alert and oriented to person, place, and time. She has normal strength. No cranial nerve deficit or sensory deficit.   Skin: Skin is  warm and dry. Capillary refill takes less than 2 seconds. Rash noted. There is erythema.   Psychiatric: She has a normal mood and affect.         ED Course   Procedures  Labs Reviewed   CBC W/ AUTO DIFFERENTIAL - Abnormal; Notable for the following components:       Result Value    WBC 14.56 (*)     Gran # (ANC) 9.5 (*)     All other components within normal limits   COMPREHENSIVE METABOLIC PANEL - Abnormal; Notable for the following components:    CO2 21 (*)     All other components within normal limits   TROPONIN I   SEDIMENTATION RATE   C-REACTIVE PROTEIN   CK          Imaging Results    None          Medications   ketorolac injection 15 mg (has no administration in time range)   methylPREDNISolone sodium succinate injection 125 mg (125 mg Intravenous Given 2/21/24 0222)   diphenhydrAMINE injection 25 mg (25 mg Intravenous Given 2/21/24 0222)   famotidine (PF) injection 20 mg (20 mg Intravenous Given 2/21/24 0222)   benzocaine-lanolin (DERMOPLAST) topical spray ( Topical (Top) Given 2/21/24 0415)     Medical Decision Making  [age] year old female on initial assessment in mild to moderate distress secondary to possible allergic reaction.  Patient is alert oriented x3, neurologically and neurovascular intact no focal deficits.  She is nontoxic-appearing and vitals stable at this time.    Differential diagnosis: Allergic reaction, cellulitis, systemic lupus, malar rash    Amount and/or Complexity of Data Reviewed  Labs: ordered. Decision-making details documented in ED Course.    Risk  Prescription drug management.  Risk Details: Patient has been reassessed noted to have no acute changes in her condition.  She was given IV fluids as well as Benadryl, Solu-Medrol and Pepcid with improvement.  Patient also use aloe vera gel and topical application for her burning feeling.  Labs images otherwise unremarkable for any acute pathology requiring further hospital admission or treatment this time.  Patient has remained  stable while in the ED and discharged home stable condition with follow-up as needed.  Ms. Duran and family are aware of the plan and in agreement with discharge.    Patient's plan and diagnosis was discussed. All questions were answered and patient was comfortable with the plan. This patient was personally seen and personally examined by me and I personally performed the services described in this documentation.   Complexity of the visit is established by the note or I have spent at least the amount of time discussing findings, exam and/or radiographs or imaging studies.     MD uses EPIC and voice recognition software prone to occasional and minor errors that may persist in the medical record.                                        Clinical Impression:  Final diagnoses:  [R21] Rash and nonspecific skin eruption (Primary)  [T78.40XA] Allergic reaction, initial encounter          ED Disposition Condition    Discharge Stable          ED Prescriptions       Medication Sig Dispense Start Date End Date Auth. Provider    predniSONE (DELTASONE) 20 MG tablet Take 2 tablets (40 mg total) by mouth once daily. for 5 days 10 tablet 2/21/2024 2/26/2024 Gerald Oconnor MD    bacitracin 500 unit/gram Oint Apply topically 2 (two) times daily. 30 g 2/21/2024 -- Gerald Oconnor MD    aloe vera 99.5 % Gel Apply 1 Application topically 3 (three) times daily. 50 g 2/21/2024 -- Gerald Oconnor MD          Follow-up Information       Follow up With Specialties Details Why Contact Info Additional Information    [place] [facility] -  Emergency Medicine  As needed, If symptoms worsen [address] [place] [address] [address] [place] [address] 1st floor    Andrew Perales Jr., MD Family Medicine Schedule an appointment as soon as possible for a visit  As needed, If symptoms worsen [address] [facility]  [address]  [place] [address]  [phone]                Gerald Oconnor MD  02/21/24 [id number]

## 2024-02-21 NOTE — TELEPHONE ENCOUNTER
Pt had a knee injection on Monday the 19th and began with redness to her face earlier, hot to touch and swelling around her eyes and face tonight.  Care advice states to see a provider within 4 hours.  ODVV offered and accepted.  Patient verbally understands, all questions answered, advised to call back for any worsening symptoms or further needs.     Reason for Disposition   [1] Swelling is red AND [2] very painful to touch    Additional Information   Negative: [1] Life-threatening reaction (anaphylaxis) in the past to similar substance (e.g., food, insect bite/sting, chemical, etc.) AND [2] < 2 hours since exposure   Negative: Unresponsive, passed out or very weak   Negative: Swollen tongue   Negative: Difficulty breathing or wheezing   Negative: Sounds like a life-threatening emergency to the triager   Negative: [1] SEVERE swelling of entire face AND [2] < 2 hours since exposure to high-risk allergen (e.g., peanuts, tree nuts, fish, shellfish or 1st dose of drug) AND [3] no serious symptoms AND [4] no serious allergic reaction in the past   Negative: Fever   Negative: Taking an ACE Inhibitor medicine (e.g., benazepril / LOTENSIN, captopril / CAPOTEN, enalapril / VASOTEC, lisinopril / ZESTRIL)   Negative: Patient sounds very sick or weak to the triager   Negative: SEVERE swelling of the entire face    Protocols used: Face Swelling-A-AH

## 2024-02-21 NOTE — TELEPHONE ENCOUNTER
Called and left VM for patient to she how she was doing.  Received nurse triage note, patient reported to ER as instructed.      Asa

## 2024-02-22 ENCOUNTER — TELEPHONE (OUTPATIENT)
Dept: ORTHOPEDICS | Facility: CLINIC | Age: 69
End: 2024-02-22
Payer: MEDICARE

## 2024-02-22 NOTE — TELEPHONE ENCOUNTER
Pt feeling much better today, with only slight irritation continuing on face. Pt is suing Aloe vera as directed and is feeling better. Pt reports knee feeling great.

## 2024-02-22 NOTE — TELEPHONE ENCOUNTER
----- Message from Kedar Holt MA sent at 2/22/2024  9:03 AM CST -----  Contact: patient  Missed a call back from office about a reaction she had to her injection on 2/19/24.    Call back number is [phone]

## 2024-03-08 ENCOUNTER — PATIENT MESSAGE (OUTPATIENT)
Dept: ADMINISTRATIVE | Facility: HOSPITAL | Age: 69
End: 2024-03-08
Payer: MEDICARE

## 2024-03-08 ENCOUNTER — PATIENT OUTREACH (OUTPATIENT)
Dept: ADMINISTRATIVE | Facility: HOSPITAL | Age: 69
End: 2024-03-08
Payer: MEDICARE

## 2024-03-08 NOTE — PROGRESS NOTES
Population Health Chart Review & Patient Outreach Details      Additional [facility] Health Notes:      BREAST CANCER SCREENING    Non-compliant report chart audits for BREAST CANCER SCREENING     Outreach to patient in reference to SCHEDULING A MAMMOGRAM EXAM.            Updates Requested / Reviewed:      External Sources: DIS         Health Maintenance Topics Overdue:      VBHM Score: 3     Osteoporosis Screening  Mammogram  Hemoglobin A1c    Influenza Vaccine  Pneumonia Vaccine  Tetanus Vaccine  Shingles/Zoster Vaccine  RSV Vaccine                  Health Maintenance Topic(s) Outreach Outcomes & Actions Taken:    Breast Cancer Screening - Outreach Outcomes & Actions Taken  : msg

## 2024-03-15 ENCOUNTER — PATIENT MESSAGE (OUTPATIENT)
Dept: FAMILY MEDICINE | Facility: CLINIC | Age: 69
End: 2024-03-15
Payer: MEDICARE

## 2024-04-09 ENCOUNTER — PATIENT MESSAGE (OUTPATIENT)
Dept: ADMINISTRATIVE | Facility: HOSPITAL | Age: 69
End: 2024-04-09
Payer: MEDICARE

## 2024-05-02 ENCOUNTER — PATIENT MESSAGE (OUTPATIENT)
Dept: ORTHOPEDICS | Facility: CLINIC | Age: 69
End: 2024-05-02
Payer: MEDICARE

## 2024-05-20 ENCOUNTER — OFFICE VISIT (OUTPATIENT)
Dept: FAMILY MEDICINE | Facility: CLINIC | Age: 69
End: 2024-05-20
Payer: MEDICARE

## 2024-05-20 VITALS
TEMPERATURE: 99 F | OXYGEN SATURATION: 98 % | SYSTOLIC BLOOD PRESSURE: 180 MMHG | BODY MASS INDEX: 29.02 KG/M2 | RESPIRATION RATE: 15 BRPM | WEIGHT: 163.81 LBS | HEART RATE: 99 BPM | DIASTOLIC BLOOD PRESSURE: 110 MMHG | HEIGHT: 63 IN

## 2024-05-20 DIAGNOSIS — Z01.818 PREOP EXAMINATION: Primary | ICD-10-CM

## 2024-05-20 DIAGNOSIS — R03.0 ELEVATED BLOOD PRESSURE READING IN OFFICE WITHOUT DIAGNOSIS OF HYPERTENSION: ICD-10-CM

## 2024-05-20 PROCEDURE — 99214 OFFICE O/P EST MOD 30 MIN: CPT | Mod: PBBFAC,PO

## 2024-05-20 PROCEDURE — 99999 PR PBB SHADOW E&M-EST. PATIENT-LVL IV: CPT | Mod: PBBFAC,,,

## 2024-05-20 PROCEDURE — 99215 OFFICE O/P EST HI 40 MIN: CPT | Mod: S$PBB,,,

## 2024-05-20 RX ORDER — VALSARTAN 160 MG/1
160 TABLET ORAL DAILY
Qty: 30 TABLET | Refills: 1 | Status: SHIPPED | OUTPATIENT
Start: 2024-05-20 | End: 2025-05-20

## 2024-05-20 NOTE — LETTER
May 24, 2024        Rod Orellana MD  [address] [place] [address] [address]  [place] [address]             [facility]  [address]  [place] [address]  Phone: [phone]  Fax: [phone]   Patient: Abi Duran   MR Number: [id number]   YOB: 1955   Date of Visit: 5/20/2024       Dear Dr. Orellana:    Thank you for referring Abi Duran to me for evaluation. Below are the relevant portions of my assessment and plan of care. After getting her blood pressure under better control, this patient is medically optimized for her upcoming procedure with you. It would be appropriate to proceed as planned.             If you have questions, please do not hesitate to call me. I look forward to following Abi along with you.    Sincerely,      Burak Rodriguez PA-C           CC    No Recipients

## 2024-05-20 NOTE — PROGRESS NOTES
Patient ID: Abi Duran is a 68 y.o. female.    Chief Complaint: Pre-op Exam (R knee replacement)        Preop exam. Knee surgery via Dr. Krause. Denies adverse reaction to general anesthesia previously. No excessive bleeding or bruising. Reviewed meds. No experienced CP or SOB. Has had stress test which was normal in the past. If she didn't have issues with her knee she could walk city blocks or flights of stairs without experiencing CP or SOB. Denies issues with JALEN in the past.     Pressure is elevated at presentation. Has been elevated at previous appointments per chart review. States that this is due to her significant pain. Pain is near a 10 today. Unable to take her pain meds prior to presentation due to not being able to drive when taking. States that pressures have been normal or low in the past when she was not in pain.     I discussed with her that the blood pressures are too high and put her at risk during her surgery. That we should initiate some sort of therapy to lower the pressures a bit to lower her risk. She is initially resistant to this idea as she does not like taking medications and is fearful of side effects of medications.           Past Medical History:   Diagnosis Date    Allergy     Arthritis     GERD (gastroesophageal reflux disease)     Ulcer                 Current Outpatient Medications:     BORON ORAL, Take by mouth., Disp: , Rfl:     aloe vera 99.5 % Gel, Apply 1 Application topically 3 (three) times daily., Disp: 50 g, Rfl: 0    bacitracin 500 unit/gram Oint, Apply topically 2 (two) times daily., Disp: 30 g, Rfl: 0    The ASCVD Risk score ([place] DK, et al., 2019) failed to calculate for the following reasons:    Cannot find a previous HDL lab    Cannot find a previous total cholesterol lab     Wt Readings from Last 3 Encounters:   05/20/24 74.3 kg (163 lb 12.8 oz)   02/21/24 73.5 kg (162 lb 0.6 oz)   02/19/24 74.4 kg (164 lb)     Temp Readings from Last 3 Encounters:  "  05/20/24 98.5 °F (36.9 °C) (Oral)   02/21/24 98.2 °F (36.8 °C) (Oral)   09/20/22 98.1 °F (36.7 °C) (Oral)     BP Readings from Last 3 Encounters:   05/20/24 (!) 180/110   02/21/24 (!) 229/113   11/09/22 (!) 140/100     Pulse Readings from Last 3 Encounters:   05/20/24 99   02/21/24 96   11/09/22 80     Resp Readings from Last 3 Encounters:   05/20/24 15   02/21/24 16   09/18/23 18     PF Readings from Last 3 Encounters:   No data found for PF     SpO2 Readings from Last 3 Encounters:   05/20/24 98%   02/21/24 100%   09/20/22 96%        No results found for: "HGBA1C"  Lab Results   Component Value Date    LDLCALC 102.6 09/29/2011    CREATININE 0.8 02/21/2024       Review of Systems   Constitutional:  Positive for activity change.   Musculoskeletal:  Positive for arthralgias and gait problem.           Objective:      Physical Exam  Vitals reviewed.   Constitutional:       General: She is not in acute distress.     Appearance: Normal appearance. She is normal weight. She is not ill-appearing, toxic-appearing or diaphoretic.   HENT:      Head: Normocephalic.      Right Ear: External ear normal.      Left Ear: External ear normal.      Nose: Nose normal. No congestion or rhinorrhea.      Mouth/Throat:      Mouth: Mucous membranes are moist.      Pharynx: Oropharynx is clear.   Eyes:      General: No scleral icterus.        Right eye: No discharge.         Left eye: No discharge.      Extraocular Movements: Extraocular movements intact.      Conjunctiva/sclera: Conjunctivae normal.   Cardiovascular:      Rate and Rhythm: Normal rate and regular rhythm.      Pulses: Normal pulses.      Heart sounds: Normal heart sounds. No murmur heard.     No friction rub. No gallop.   Pulmonary:      Effort: Pulmonary effort is normal. No respiratory distress.      Breath sounds: Normal breath sounds. No wheezing, rhonchi or rales.   Chest:      Chest wall: No tenderness.   Musculoskeletal:         General: Tenderness present. No " swelling or deformity. Normal range of motion.      Cervical back: Normal range of motion.      Right lower leg: No edema.      Left lower leg: No edema.   Skin:     General: Skin is warm and dry.      Capillary Refill: Capillary refill takes less than 2 seconds.      Coloration: Skin is not jaundiced.      Findings: No bruising, erythema, lesion or rash.   Neurological:      Mental Status: She is alert and oriented to person, place, and time.      Gait: Gait abnormal.   Psychiatric:         Mood and Affect: Mood normal.         Behavior: Behavior normal.         Thought Content: Thought content normal.         Judgment: Judgment normal.             Screening recommendations appropriate to age and health status were reviewed.    STOP BANG:    Preop examination  Addendum:      -    Pt is medically optimized for upcoming procedure. Proceed as scheduled.     Elevated blood pressure reading in office without diagnosis of hypertension  -     valsartan (DIOVAN) 160 MG tablet; Take 1 tablet (160 mg total) by mouth once daily.  Dispense: 30 tablet; Refill: 1        -    Omron cuff. Contact me Wednesday with update on pressures.     Addendum:  Updated blood pressure readings.     Tues AM - 154/86 pulse 84  Wed AM - 159/94 pulse 86  Thurs AM - 144/90 pulse 74  Fri AM - 137/80 pulse 72    RCRI risk factors include: no known RCRI risk factors. As such, per RCRI the risk of cardiac death, nonfatal myocardial infarction, or nonfatal cardiac arrest is 0.4% and the risk of myocardial infarction, pulmonary edema, ventricular fibrillation, primary cardiac arrest, or complete heart block is 0.5%.  Overall this patient can be considered low risk for this intermediate risk procedure. No further cardiac testing is recommended at this time.     Patient denies any symptoms (as per HPI) concerning for undiagnosed lung disease including JALEN. Would not recommend obtaining chest X-ray, sleep study, or PFTs at this time. Patient is a  non-smoker. We discussed the benefits of early mobilization and deep breathing after surgery.      Screened patient for alcohol misuse, use of illicit drugs, and personal or family history of anesthetic complications or bleeding diathesis and no substantial concerns were identified.     All current medications were reviewed and at this time no changes to medications are recommended prior to surgery. One week prior avoid ASA and NSAIDs     I recommend use of standard pre-op and post-op precautions for this patient. In my opinion, she is medically optimized for this procedure, and can proceed without further evaluation.

## 2024-05-20 NOTE — LETTER
May 24, 2024        Rod Orellana MD  [address] [place] [address] [address]  [place] [address]             [facility]  [address]  [place] [address]  Phone: [phone]  Fax: [phone]   Patient: Abi Duran   MR Number: [id number]   YOB: 1955   Date of Visit: 5/20/2024       Dear Dr. Orellana:    Thank you for referring Abi Duran to me for evaluation. Below are the relevant portions of my assessment and plan of care. This patient is medically optimized for her upcoming procedure with you. It would be appropriate to proceed as planned.             If you have questions, please do not hesitate to call me. I look forward to following Abi along with you.    Sincerely,      Burak Rodriguez PA-C           CC    No Recipients

## 2024-05-20 NOTE — PATIENT INSTRUCTIONS
"OMRON blood pressure cuff from [employer] by [employer]. Take medication at night. Check pressure first thing in the morning.   Contact me on Wednesday with update of blood pressure readings. Goal blood pressure is top pressure 139 or lower. Bottom pressure 89 or lower.       Martinez Alex,     If you are due for any health screening(s) below please notify me so we can arrange them to be ordered and scheduled to maintain your health. Most healthy patients complete it. Don't lose out on improving your health.     Tests to Keep You Healthy    Mammogram: DUE  Colon Cancer Screening: Met on 1/25/2016      Breast Cancer Screening    Breast cancer is the second most common cancer in women after skin cancer, and the second leading cause of death from cancer after lung cancer. Mammograms can detect breast cancer early, which significantly increases the chances of curing the cancer.      A screening mammogram is an x-ray image of the breasts used for early breast cancer detection. It can help reduce the number of deaths from breast cancer among women. To get a clear image, the breast is placed between two plastic plates to make it flat. How often a mammogram is needed depends on your age and your breast cancer risk.                     Patient Education       Checking Your Blood Pressure at Home   The Basics   Written by the doctors and editors at [facility]   How is blood pressure measured? -- Blood pressure is usually measured with a device that goes around your upper arm. This is often done in a doctor's office. But some people also check their blood pressure themselves, at home or at work.  Blood pressure is explained with 2 numbers. For instance, your blood pressure might be "140 over 90." The first (top) number is the pressure inside your arteries when your heart is patti. The second (bottom) number is the pressure inside your arteries when your heart is relaxed. The table shows how doctors and nurses define high and normal blood " "pressure (table 1).  If your blood pressure gets too high, it puts you at risk for heart attack, stroke, and kidney disease. High blood pressure does not usually cause symptoms. But it can be serious.  What is a home blood pressure meter? -- A home blood pressure meter (or "monitor") is a device you can use to check your blood pressure yourself. It has a cuff that goes around your upper arm (figure 1). Some devices have a cuff that goes around your wrist instead. But doctors aren't sure if these work as well. The meter also has a small screen, or dial, that shows your blood pressure numbers.  There are also special meters you can wear for a day or 2. These are different because they automatically check your blood pressure throughout the day and night, even while you are sleeping. If your doctor thinks you should use one of these devices, they will talk to you about how to wear it.  Why do I need to check my blood pressure at home? -- If your doctor knows or suspects that you have high blood pressure, they might want you to check it at home. There are a few reasons for this. Your doctor might want to look at:  Whether your blood pressure measures the same at home as it did in the doctor's office  How well your blood pressure medicines are working  Changes in your blood pressure, for example, if it goes up and down  People who check their own blood pressure at home usually do better at keeping it low.  How do I choose a home blood pressure meter? -- When choosing a home blood pressure meter, you will probably want to think about:  Cost - Some devices cost more than others. You should also check to see if your insurance will help pay for your device.  Size - It's important to make sure the cuff fits your arm comfortably. Your doctor or nurse can help you with this.  How easy it is to use - You should make sure you understand how to use the device. You also need to be able to read the numbers on the screen.  You do not " need a prescription to buy a home blood pressure meter. You can buy them at most pharmacies or over the internet. Your doctor or nurse can help you choose the right device for you.  How do I check my blood pressure at home? -- Once you have a home blood pressure meter, your doctor or nurse should check it to make sure it fits you and works correctly.  When it's time to check your blood pressure:  Go to the bathroom and empty your bladder first. Having a full bladder can temporarily increase your blood pressure, making the results inaccurate.  Sit in a chair with your feet flat on the ground.  Try to breathe normally and stay calm.  Attach the cuff to your arm. Place the cuff directly on your skin, not over your clothing. The cuff should be tight enough to not slip down, but not uncomfortably tight.  Sit and relax for about 3 to 5 minutes with the cuff on.  Follow the directions that came with your device to start measuring your blood pressure. This might involve squeezing the bulb at the end of the tube to inflate the cuff (fill it with air). With some monitors, you just need to press a button to inflate the cuff. When the cuff fills with air, it feels like someone is squeezing your arm, but it should not hurt. Then you will slowly deflate the cuff (let the air out of it), or it will deflate by itself. The screen or dial will show your blood pressure numbers.  Stay seated and relax for 1 minute, then measure your blood pressure again.  How often should I check my blood pressure? -- It depends. Different people need to follow different schedules. Your doctor or nurse will tell you how often to check your blood pressure, and when. Some people need to check their blood pressure twice a day, in the morning and evening.  Your doctor or nurse will probably tell you to keep track of your blood pressure for at least a few days (table 2). Then they will look at the numbers. The reason for this is that it's normal for your  "blood pressure to change a bit from day to day. For example, the numbers might change depending on whether you recently had caffeine, just exercised, or feel stressed. Checking your blood pressure over several days - or longer - will give your doctor or nurse a better idea of what is average for you.  How should I keep track of my blood pressure? -- Some blood pressure meters will record your numbers for you, or send them to your computer or smartphone. If yours does not do this, you will need to write them down. Your doctor or nurse can help you figure out the best way to keep track of the numbers.  What if my blood pressure is high? -- Your doctor or nurse will tell you what to do if your blood pressure is high when you check it at home. If you get a number that is higher than normal, measure it again to see if it is still high. If it is very high (above a certain number, which your doctor or nurse will tell you to watch out for), you should call your doctor right away.  If your blood pressure is only a little high, your doctor or nurse might tell you to keep checking it for a few more days or weeks, and then call if it does not go back down. Then they can help you decide what to do next.  All topics are updated as new evidence becomes available and our peer review process is complete.  This topic retrieved from [employer] on: Sep 21, 2021.  Topic 076656 Version 4.0  Release: 29.4.2 - C29.263  © 2021 UpToDate, Inc. and/or its affiliates. All rights reserved.  table 1: Definition of normal and high blood pressure  Level  Top number  Bottom number    High 130 or above 80 or above   Elevated 120 to 129 79 or below   Normal 119 or below 79 or below   These definitions are from the American College of Cardiology/American Heart Association. Other expert groups might use slightly different definitions.  "Elevated blood pressure" is a term doctor or nurses use as a warning. It means you do not yet have high blood pressure, " but your blood pressure is not as low as it should be for good health.  Graphic 51418 Version 6.0  figure 1: Using a home blood pressure meter     This is an example of a person using a home blood pressure meter.  Graphic 541227 Version 1.0    table 2: 7-day diary for checking blood pressure at home  Day 1  Day 2  Day 3  Day 4  Day 5  Day 6  Day 7    Morning  1st read Morning  1st read Morning  1st read Morning  1st read Morning  1st read Morning  1st read Morning  1st read   Systolic: __________ Systolic: __________ Systolic: __________ Systolic: __________ Systolic: __________ Systolic: __________ Systolic: __________   Diastolic: __________ Diastolic: __________ Diastolic: __________ Diastolic: __________ Diastolic: __________ Diastolic: __________ Diastolic: __________   Pulse: __________ Pulse: __________ Pulse: __________ Pulse: __________ Pulse: __________ Pulse: __________ Pulse: __________   Morning  2nd read Morning  2nd read Morning  2nd read Morning  2nd read Morning  2nd read Morning  2nd read Morning  2nd read   Systolic: __________ Systolic: __________ Systolic: __________ Systolic: __________ Systolic: __________ Systolic: __________ Systolic: __________   Diastolic: __________ Diastolic: __________ Diastolic: __________ Diastolic: __________ Diastolic: __________ Diastolic: __________ Diastolic: __________   Pulse: __________ Pulse: __________ Pulse: __________ Pulse: __________ Pulse: __________ Pulse: __________ Pulse: __________   Evening  1st read Evening  1st read Evening  1st read Evening  1st read Evening  1st read Evening  1st read Evening  1st read   Systolic: __________ Systolic: __________ Systolic: __________ Systolic: __________ Systolic: __________ Systolic: __________ Systolic: __________   Diastolic: __________ Diastolic: __________ Diastolic: __________ Diastolic: __________ Diastolic: __________ Diastolic: __________ Diastolic: __________   Pulse: __________ Pulse: __________  Pulse: __________ Pulse: __________ Pulse: __________ Pulse: __________ Pulse: __________   Evening  2nd read Evening  2nd read Evening  2nd read Evening  2nd read Evening  2nd read Evening  2nd read Evening  2nd read   Systolic: __________ Systolic: __________ Systolic: __________ Systolic: __________ Systolic: __________ Systolic: __________ Systolic: __________   Diastolic: __________ Diastolic: __________ Diastolic: __________ Diastolic: __________ Diastolic: __________ Diastolic: __________ Diastolic: __________   Pulse: __________ Pulse: __________ Pulse: __________ Pulse: __________ Pulse: __________ Pulse: __________ Pulse: __________   Notes    Notes    Notes    Notes    Notes    Notes    Notes      ____________________ ____________________ ____________________ ____________________ ____________________ ____________________ ____________________   ____________________ ____________________ ____________________ ____________________ ____________________ ____________________ ____________________   ____________________ ____________________ ____________________ ____________________ ____________________ ____________________ ____________________   Patient name: ______________________________     Patient ID: ________________________________    Primary care provider: _______________________    Average BP: _______________________________    Graphic 425233 Version 1.0  Consumer Information Use and Disclaimer   This information is not specific medical advice and does not replace information you receive from your health care provider. This is only a brief summary of general information. It does NOT include all information about conditions, illnesses, injuries, tests, procedures, treatments, therapies, discharge instructions or life-style choices that may apply to you. You must talk with your health care provider for complete information about your health and treatment options. This information should not be used to decide  whether or not to accept your health care provider's advice, instructions or recommendations. Only your health care provider has the knowledge and training to provide advice that is right for you. The use of this information is governed by the [employer] End User License Agreement, available at https://www.[employer]/en/solutions/[employer]/about/alexy.The use of Embedded Chat content is governed by the Embedded Chat Terms of Use. ©2021 UpToDate, Inc. All rights reserved.  Copyright   © 2021 UpToDate, Inc. and/or its affiliates. All rights reserved.

## 2024-05-21 ENCOUNTER — PATIENT MESSAGE (OUTPATIENT)
Dept: FAMILY MEDICINE | Facility: CLINIC | Age: 69
End: 2024-05-21
Payer: MEDICARE

## 2024-05-21 DIAGNOSIS — R11.0 NAUSEA: Primary | ICD-10-CM

## 2024-05-22 ENCOUNTER — NURSE TRIAGE (OUTPATIENT)
Dept: ADMINISTRATIVE | Facility: CLINIC | Age: 69
End: 2024-05-22
Payer: MEDICARE

## 2024-05-22 RX ORDER — ONDANSETRON 8 MG/1
8 TABLET, ORALLY DISINTEGRATING ORAL EVERY 6 HOURS PRN
Qty: 30 TABLET | Refills: 0 | Status: SHIPPED | OUTPATIENT
Start: 2024-05-22

## 2024-05-24 ENCOUNTER — PATIENT MESSAGE (OUTPATIENT)
Dept: ORTHOPEDICS | Facility: CLINIC | Age: 69
End: 2024-05-24
Payer: MEDICARE

## 2024-05-24 DIAGNOSIS — M17.11 PRIMARY OSTEOARTHRITIS OF RIGHT KNEE: Primary | ICD-10-CM

## 2024-05-27 DIAGNOSIS — Z01.818 PRE-OP TESTING: ICD-10-CM

## 2024-05-27 DIAGNOSIS — M17.11 PRIMARY OSTEOARTHRITIS OF RIGHT KNEE: Primary | ICD-10-CM

## 2024-05-27 RX ORDER — MUPIROCIN 20 MG/G
OINTMENT TOPICAL
OUTPATIENT
Start: 2024-05-27

## 2024-05-27 RX ORDER — SODIUM CHLORIDE 9 MG/ML
INJECTION, SOLUTION INTRAVENOUS CONTINUOUS
OUTPATIENT
Start: 2024-05-27

## 2024-05-27 RX ORDER — CLINDAMYCIN PHOSPHATE 900 MG/50ML
900 INJECTION, SOLUTION INTRAVENOUS
OUTPATIENT
Start: 2024-05-27

## 2024-06-13 ENCOUNTER — HOSPITAL ENCOUNTER (OUTPATIENT)
Dept: RADIOLOGY | Facility: HOSPITAL | Age: 69
Discharge: HOME | End: 2024-06-13
Attending: ORTHOPAEDIC SURGERY
Payer: MEDICARE

## 2024-06-13 ENCOUNTER — HOSPITAL ENCOUNTER (OUTPATIENT)
Dept: PREADMISSION TESTING | Facility: HOSPITAL | Age: 69
Discharge: HOME | End: 2024-06-13
Attending: ORTHOPAEDIC SURGERY
Payer: MEDICARE

## 2024-06-13 ENCOUNTER — PATIENT MESSAGE (OUTPATIENT)
Dept: ORTHOPEDICS | Facility: CLINIC | Age: 69
End: 2024-06-13
Payer: MEDICARE

## 2024-06-13 DIAGNOSIS — M17.11 PRIMARY OSTEOARTHRITIS OF RIGHT KNEE: Primary | ICD-10-CM

## 2024-06-13 DIAGNOSIS — M17.11 PRIMARY OSTEOARTHRITIS OF RIGHT KNEE: ICD-10-CM

## 2024-06-13 DIAGNOSIS — Z01.818 PRE-OP EXAM: ICD-10-CM

## 2024-06-13 DIAGNOSIS — Z01.818 PREOP TESTING: Primary | ICD-10-CM

## 2024-06-13 DIAGNOSIS — Z01.818 PRE-OP TESTING: ICD-10-CM

## 2024-06-13 LAB
ANION GAP SERPL CALC-SCNC: 11 MMOL/L (ref 8–16)
BASOPHILS # BLD AUTO: 0.03 K/UL (ref 0–0.2)
BASOPHILS NFR BLD: 0.3 % (ref 0–1.9)
BUN SERPL-MCNC: 29 MG/DL (ref 8–23)
CALCIUM SERPL-MCNC: 9.9 MG/DL (ref 8.7–10.5)
CHLORIDE SERPL-SCNC: 103 MMOL/L (ref 95–110)
CO2 SERPL-SCNC: 25 MMOL/L (ref 23–29)
CREAT SERPL-MCNC: 0.8 MG/DL (ref 0.5–1.4)
DIFFERENTIAL METHOD BLD: ABNORMAL
EOSINOPHIL # BLD AUTO: 0.1 K/UL (ref 0–0.5)
EOSINOPHIL NFR BLD: 1 % (ref 0–8)
ERYTHROCYTE [DISTWIDTH] IN BLOOD BY AUTOMATED COUNT: 13.2 % (ref 11.5–14.5)
EST. GFR  (NO RACE VARIABLE): >60 ML/MIN/1.73 M^2
GLUCOSE SERPL-MCNC: 92 MG/DL (ref 70–110)
HCT VFR BLD AUTO: 45.3 % (ref 37–48.5)
HGB BLD-MCNC: 14.3 G/DL (ref 12–16)
IMM GRANULOCYTES # BLD AUTO: 0.03 K/UL (ref 0–0.04)
IMM GRANULOCYTES NFR BLD AUTO: 0.3 % (ref 0–0.5)
LYMPHOCYTES # BLD AUTO: 2.6 K/UL (ref 1–4.8)
LYMPHOCYTES NFR BLD: 25 % (ref 18–48)
MCH RBC QN AUTO: 28.3 PG (ref 27–31)
MCHC RBC AUTO-ENTMCNC: 31.6 G/DL (ref 32–36)
MCV RBC AUTO: 90 FL (ref 82–98)
MONOCYTES # BLD AUTO: 0.7 K/UL (ref 0.3–1)
MONOCYTES NFR BLD: 6.5 % (ref 4–15)
MRSA SCREEN BY PCR: NEGATIVE
NEUTROPHILS # BLD AUTO: 7.1 K/UL (ref 1.8–7.7)
NEUTROPHILS NFR BLD: 66.9 % (ref 38–73)
NRBC BLD-RTO: 0 /100 WBC
PLATELET # BLD AUTO: 347 K/UL (ref 150–450)
PMV BLD AUTO: 10.2 FL (ref 9.2–12.9)
POTASSIUM SERPL-SCNC: 4.1 MMOL/L (ref 3.5–5.1)
RBC # BLD AUTO: 5.05 M/UL (ref 4–5.4)
SODIUM SERPL-SCNC: 139 MMOL/L (ref 136–145)
WBC # BLD AUTO: 10.52 K/UL (ref 3.9–12.7)

## 2024-06-13 PROCEDURE — 73700 CT LOWER EXTREMITY W/O DYE: CPT | Mod: TC,RT

## 2024-06-13 PROCEDURE — 85025 COMPLETE CBC W/AUTO DIFF WBC: CPT | Performed by: ORTHOPAEDIC SURGERY

## 2024-06-13 PROCEDURE — 73700 CT LOWER EXTREMITY W/O DYE: CPT | Mod: 26,RT,, | Performed by: RADIOLOGY

## 2024-06-13 PROCEDURE — 36415 COLL VENOUS BLD VENIPUNCTURE: CPT | Performed by: ORTHOPAEDIC SURGERY

## 2024-06-13 PROCEDURE — 87641 MR-STAPH DNA AMP PROBE: CPT | Performed by: ORTHOPAEDIC SURGERY

## 2024-06-13 PROCEDURE — 80048 BASIC METABOLIC PNL TOTAL CA: CPT | Performed by: ORTHOPAEDIC SURGERY

## 2024-06-13 PROCEDURE — 93005 ELECTROCARDIOGRAM TRACING: CPT

## 2024-06-13 NOTE — PRE ADMISSION SCREENING
"               [facility] Risk Assessment Scale    Patient Name: Abi Duran  YOB: 1955  MRN: [id number]            RIsk Factor Measure Recommendation Patient Data Scale/Score   BMI >40 Reconsider surgery, weight loss   Estimated body mass index is 29.02 kg/m² as calculated from the following:    Height as of 5/20/24: 5' 3" (1.6 m).    Weight as of 5/20/24: 74.3 kg (163 lb 12.8 oz).   [] 0 = 1 - 24.9  [x] 1 = 25-29.9  [] 2 = 30-34.9  [] 3 = 35-39.9  [] 4 = 40-44.9  [] 5 = 45-99.9   Hemoglobin AIC (if applicable) >9 Delay surgery until DM under control  Refer for:  Nutrition Therapy  Exercise   Medication    No results found for: "LABA1C", "HGBA1C"    Lab Results   Component Value Date    GLU 92 06/13/2024      [] 0 = 4.0-5.6  [] 1 = 5.7-6.4  [] 2 = 6.5-6.9  [] 3 = 7.0-7.9  [] 4 = 8.0-8.9  [] 5 = 9.0-12   Hemoglobin (Anemia) <9 Delay surgery   Correct anemia Lab Results   Component Value Date    HGB 14.3 06/13/2024    [] 20 - <9.0                    Albumin <3 Delay surgery &Workup Lab Results   Component Value Date    ALBUMIN 4.2 02/21/2024    [] 20 - <3.0   Smoking Cessation >4 Weeks Delay Surgery  Refer to OP Cessation Class    Never Smoker [] 20 - current smoker                                _____ PPD                    Hx of MI, PE, Arrhythmia, CVA, DVT <30 Days Delay Surgery    N/A [] 20      Infection Variable Delay surgery and re-evaluate   N/A [] 20 - recent/current infection     Depression (PHQ) >10 out of 27 Delay Surgery and re-evaluate  Medication  Counseling              [x] 0     []1     []2     []3      []4      [] 5                    (1-4)      (5-9)  (10-14)  (15-19)   (20-27)     Memory Impairment & Memory loss (Mini-Cog Screening Tool) Advanced dementia and/or Parkinson's Reconsider surgery     [x] 0     []1     []2     []3     []4     [] 5     Physical Conditioning (Modified AM-PAC Per Physical Therapy at [facility]) Unable to ambulate on day of surgery Delay surgery and " re-evaluate  Pre-Rehabilitation   (PT evaluation)       [x]  0   []4       []8     []12        []16     []20       (<20%)   (<40%)   (<60%)   (<80% )    (>80%)     Home Environment/Caregiver support  (Per /Navigator Interview)    Availability of basic services and/or approprate assistance during post-operative period Delay surgery and re-evaluate  Safe home environment  Health   1 week post-surgery  Transportation  availability  Ability to obtain DME/Medications post-op    [x] 0     []1     []2     []3     []4     [] 5  [x] 0     []1     []2     []3     []4     [] 5  [x] 0     []1     []2     []3     []4     [] 5  [x] 0     []1     []2     []3     []4     [] 5         MD Contact: Dr. Orellana Comments:  Total Score:  1

## 2024-06-13 NOTE — PRE ADMISSION SCREENING
Patient Name: Abi Duran  YOB: 1955   MRN: [id number]     [facility]   Basic Mobility Inpatient Short Form 6 Clicks         How much difficulty does the patient currently have  Unable  A Lot  A Little  None      1. Turning over in bed (including adjusting bedclothes, sheets and blankets)?     1 []    2 []    3 []    4 [x]        2. Sitting down on and standing up from a chair with arms (e.g., wheelchair, bedside commode, etc.)     1 []  2 []  3 []     4 [x]      3. Moving from lying on back to sitting on the side of the bed?     1 []  2 []  3 []    4 [x]    How much help from another person does the patient currently need  Total  A Lot  A Little  None      4. Moving to and from a bed to a chair (including a wheelchair)?    1 []  2 []  3 []    4 [x]      5. Need to walk in hospital room?    1 []  2 []  3 []    4 [x]      6. Climbing 3-5 steps with a railing?    1 []  2 []  3 []    4 [x]       Raw Score:     24             CMS 0-100% Score:     0       %   Standardized Score:    61.14           CMS Modifier:      [facility] AMPAC[facility]   Basic Mobility Inpatient Short Form 6 Clicks Score Conversion Table*         *Use this form to convert -PAC Basic Mobility Inpatient Raw Scores.   [facility] Inpatient Basic Mobility Short Form Scoring Example   1. Add the number values associated with the response to each item. For example, items totals yield a Raw Score of 21.   2. Match the raw score to the t-Scale scores (t-Scale score = 50.25, SE = 4.69).   3. Find the associated CMS % (CMS % = 28.97%).   4. Locate the correct CMS Functional Modifier Code, or G Code (G code = CJ)     NOTE: Each -PAC Short Form has a separate conversion table. Make sure that you use the correct conversion table.       Instruction Manual - page 45 contains conversion table

## 2024-06-13 NOTE — PRE ADMISSION SCREENING
JOINT CAMP ASSESSMENT    Name Abi Duran   MRN [id number]    Age/Sex 68 y.o. female    Surgeon Dr. Rod Orellana   Joint Camp Date 6/13/2024   Surgery Date 6/25/2024   Procedure Right Knee Arthroplasty   Insurance Payor: MEDICARE / Plan: MEDICARE PART A & B / Product Type: Government /    Care Team Patient Care Team:  Andrew Perales Jr., MD as PCP - General (Family Medicine)    Pharmacy   [id number] - [place], [place] - [address]  [address]  [place] [address]  Phone: [phone] Fax: [phone]     AM-PAC Score   24   Risk Assessment Score 1     Past Medical History:   Diagnosis Date    Allergy     Arthritis     Coronary artery disease     GERD (gastroesophageal reflux disease)     Hypertension     Ulcer        Past Surgical History:   Procedure Laterality Date    CARDIAC SURGERY      angiogram    CHOLECYSTECTOMY      EYE SURGERY      lasik    HYSTERECTOMY      KNEE SURGERY      scope    TONSILLECTOMY, ADENOIDECTOMY, BILATERAL MYRINGOTOMY AND TUBES      TUBAL LIGATION           Home Enviroment     Living Arrangement: Lives with spouse  Home Environment: 1-story house/ trailer, number of outside stairs: 1, walk-in shower  Home Safety Concerns: Pets in the home: dogs (1).    DISCHARGE CAREGIVER/SUPPORT SYSTEM     Identified post-op caregiver: Patient has children / family / friends to help, spouse and daughter.  Patient's caregiver(s) will be able to provide physical assistance. Patient will have someone to assist overnight.      Caregiver present at pre-op interview:  Yes      PRE-OPERATIVE FUNCTIONAL STATUS     Employment: Retired    Pre-op Functional Status: Patient is independent with mobility/ambulation, transfers, ADL's, IADL's.    Use of assistive device for ambulation: crutches & brace  ADL: self care  ADL Limitations: difficulty with walking  Medical Restrictions: Unstable ambulation and Decreased range of motions in extremities    POTENTIAL BARRIERS TO DISCHARGE/POTENTIAL  POST-OP COMPLICATIONS     Patient with hx of HTN, coronary artery disease. Patient to stay overnight per patient and Dr. Orellana.    DISCHARGE PLAN     Expected LOS of 1 days or less for joint replacement discussed with patient. We also discussed a discharge path of HH for approximately two weeks with a transition to outpatient PT on the third week given no post-op complications.      Patient in agreement with discharge plan: Yes    Discharge to: Discharge home with home health (PT/OT) x2 weeks with transition to outpatient PT     HH:  [facility] ([place]). Patient disclosure form completed and sent to case management for upload to the medical record.      OP PT: [facility] ([place])     Home DME: crutches    Needed DME at D/C: rolling walker and bedside commode. Patient to purchase BSC from retail prior to surgery.     Rx: Per Dr. Orellana at discharge     Meds to Beds: Yes  Patient expected to discharge on  Lovenox due to complications with Aspirin  for DVT prophylaxis.

## 2024-06-13 NOTE — DISCHARGE INSTRUCTIONS
To confirm, Your doctor has instructed you that surgery is scheduled for:   6/25/24  Please report to [facility], Registration the morning of surgery. You must check-in and receive a wristband before going to your procedure.  [address] [place] [address] [place] [address]    Pre-Op will call the afternoon prior to surgery between 1:00 and 6:00 PM with the final arrival time.  Phone number: [phone]    PLEASE NOTE:  The surgery schedule has many variables which may affect the time of your surgery case.  Family members should be available if your surgery time changes.  Plan to be here the day of your procedure between 4-6 hours.    MEDICATIONS:  TAKE ONLY THESE MEDICATIONS WITH A SMALL SIP OF WATER THE MORNING OF YOUR PROCEDURE:    SEE MED LIST          DO NOT TAKE THESE MEDICATIONS 5-7 DAYS PRIOR to your procedure or per your surgeon's request:   ASPIRIN, ALEVE, ADVIL, IBUPROFEN, FISH OIL VITAMIN E, HERBALS  (May take Tylenol)    ONLY if you are prescribed any types of blood thinners such as:  Aspirin, Coumadin, Plavix, Pradaxa, Xarelto, Aggrenox, Effient, Eliquis, Savasya, Brilinta, or any other, ask your surgeon whether you should stop taking them and how long before surgery you should stop.  You may also need to verify with the prescribing physician if it is ok to stop your medication.      INSTRUCTIONS IMPORTANT!!  Do not eat or drink anything between midnight and the time of your procedure- this includes gum, mints, and candy.  Do not smoke or drink alcoholic beverages 24 hours prior to your procedure.  Shower the night before AND the morning of your procedure with a Chlorhexidine wash such as Hibiclens or Dial antibacterial soap from the neck down.  Do not get it on your face or in your eyes.  You may use your own shampoo and face wash. This helps your skin to be as bacteria free as possible.    If you wear contact lenses, dentures, hearing aids or glasses, bring a container to put them in  during surgery and give to a family member for safe keeping.  Please leave all jewelry, piercing's and valuables at home. You must remove your false eyelashes prior to surgery.    DO NOT remove hair from the surgery site.  Do not shave the incision site unless you are given specific instructions to do so.    ONLY if you have been diagnosed with sleep apnea please bring your C-PAP machine.  ONLY if you wear home oxygen please bring your portable oxygen tank the day of your procedure.  ONLY if you have a history of OPEN HEART SURGERY you will need a clearance from your Cardiologist per Anesthesia.      ONLY for patients requiring bowel prep, written instructions will be given by your doctor's office.  ONLY if you have a neuro stimulator, please bring the controller with you the morning of surgery  ONLY if a type and screen test is needed before surgery, please return:  If your doctor has scheduled you for an overnight stay, bring a small overnight bag with any personal items you need.  Make arrangements in advance for transportation home by a responsible adult. You can not go home in an uber or a cab per hospital policy.  It is not safe to drive a vehicle during the 24 hours after anesthesia.          All  facilities and properties are tobacco free.  Smoking is NOT allowed.   If you have any questions about these instructions, call Pre-Op Admit  Nursing at [phone] or the Pre-Op Day Surgery Unit at [phone].

## 2024-06-18 DIAGNOSIS — M17.11 PRIMARY OSTEOARTHRITIS OF RIGHT KNEE: Primary | ICD-10-CM

## 2024-06-18 DIAGNOSIS — Z96.651 S/P TOTAL KNEE REPLACEMENT, RIGHT: ICD-10-CM

## 2024-06-21 ENCOUNTER — PATIENT MESSAGE (OUTPATIENT)
Dept: ORTHOPEDICS | Facility: CLINIC | Age: 69
End: 2024-06-21
Payer: MEDICARE

## 2024-06-24 ENCOUNTER — ANESTHESIA EVENT (OUTPATIENT)
Dept: SURGERY | Facility: HOSPITAL | Age: 69
End: 2024-06-24
Payer: MEDICARE

## 2024-06-24 RX ORDER — ACETAMINOPHEN 500 MG
1000 TABLET ORAL EVERY 8 HOURS PRN
Qty: 30 TABLET | Refills: 0 | Status: SHIPPED | OUTPATIENT
Start: 2024-06-24

## 2024-06-24 RX ORDER — IBUPROFEN 600 MG/1
600 TABLET ORAL 3 TIMES DAILY PRN
Qty: 30 TABLET | Refills: 0 | Status: SHIPPED | OUTPATIENT
Start: 2024-06-24

## 2024-06-24 RX ORDER — CYCLOBENZAPRINE HCL 10 MG
10 TABLET ORAL 3 TIMES DAILY PRN
Qty: 30 TABLET | Refills: 0 | Status: SHIPPED | OUTPATIENT
Start: 2024-06-24 | End: 2024-07-04

## 2024-06-24 RX ORDER — HYDROCODONE BITARTRATE AND ACETAMINOPHEN 5; 325 MG/1; MG/1
1 TABLET ORAL EVERY 6 HOURS PRN
Qty: 28 TABLET | Refills: 0 | Status: SHIPPED | OUTPATIENT
Start: 2024-06-24

## 2024-06-24 RX ORDER — ONDANSETRON 4 MG/1
4 TABLET, FILM COATED ORAL EVERY 6 HOURS PRN
Qty: 30 TABLET | Refills: 0 | Status: SHIPPED | OUTPATIENT
Start: 2024-06-24

## 2024-06-25 ENCOUNTER — ANESTHESIA (OUTPATIENT)
Dept: SURGERY | Facility: HOSPITAL | Age: 69
End: 2024-06-25
Payer: MEDICARE

## 2024-06-25 ENCOUNTER — PATIENT MESSAGE (OUTPATIENT)
Dept: ORTHOPEDICS | Facility: CLINIC | Age: 69
End: 2024-06-25

## 2024-06-25 ENCOUNTER — HOSPITAL ENCOUNTER (OUTPATIENT)
Facility: HOSPITAL | Age: 69
Discharge: HOME | End: 2024-06-26
Attending: ORTHOPAEDIC SURGERY | Admitting: ORTHOPAEDIC SURGERY
Payer: MEDICARE

## 2024-06-25 DIAGNOSIS — M17.11 PRIMARY OSTEOARTHRITIS OF RIGHT KNEE: ICD-10-CM

## 2024-06-25 DIAGNOSIS — Z01.818 PRE-OP TESTING: ICD-10-CM

## 2024-06-25 PROBLEM — I10 HYPERTENSION: Status: ACTIVE | Noted: 2024-06-25

## 2024-06-25 PROCEDURE — 27201423 OPTIME MED/SURG SUP & DEVICES STERILE SUPPLY: Performed by: ORTHOPAEDIC SURGERY

## 2024-06-25 PROCEDURE — 64447 NJX AA&/STRD FEMORAL NRV IMG: CPT | Performed by: ANESTHESIOLOGY

## 2024-06-25 PROCEDURE — 94799 UNLISTED PULMONARY SVC/PX: CPT

## 2024-06-25 PROCEDURE — 63600175 PHARM REV CODE 636 W HCPCS: Performed by: ANESTHESIOLOGY

## 2024-06-25 PROCEDURE — 71000033 HC RECOVERY, INTIAL HOUR: Performed by: ORTHOPAEDIC SURGERY

## 2024-06-25 PROCEDURE — C1776 JOINT DEVICE (IMPLANTABLE): HCPCS | Performed by: ORTHOPAEDIC SURGERY

## 2024-06-25 PROCEDURE — 25000003 PHARM REV CODE 250: Performed by: ORTHOPAEDIC SURGERY

## 2024-06-25 PROCEDURE — 63600175 PHARM REV CODE 636 W HCPCS: Performed by: ORTHOPAEDIC SURGERY

## 2024-06-25 PROCEDURE — 0055T BONE SRGRY CMPTR CT/MRI IMAG: CPT | Mod: ,,, | Performed by: ORTHOPAEDIC SURGERY

## 2024-06-25 PROCEDURE — 25000003 PHARM REV CODE 250: Performed by: STUDENT IN AN ORGANIZED HEALTH CARE EDUCATION/TRAINING PROGRAM

## 2024-06-25 PROCEDURE — 71000039 HC RECOVERY, EACH ADD'L HOUR: Performed by: ORTHOPAEDIC SURGERY

## 2024-06-25 PROCEDURE — C1713 ANCHOR/SCREW BN/BN,TIS/BN: HCPCS | Performed by: ORTHOPAEDIC SURGERY

## 2024-06-25 PROCEDURE — 36000712 HC OR TIME LEV V 1ST 15 MIN: Performed by: ORTHOPAEDIC SURGERY

## 2024-06-25 PROCEDURE — 27200665 HC NERVE BLOCK NEEDLE/ CATHETER: Performed by: ANESTHESIOLOGY

## 2024-06-25 PROCEDURE — 63600175 PHARM REV CODE 636 W HCPCS: Mod: JZ,JG | Performed by: ORTHOPAEDIC SURGERY

## 2024-06-25 PROCEDURE — 27447 TOTAL KNEE ARTHROPLASTY: CPT | Mod: RT,,, | Performed by: ORTHOPAEDIC SURGERY

## 2024-06-25 PROCEDURE — 37000008 HC ANESTHESIA 1ST 15 MINUTES: Performed by: ORTHOPAEDIC SURGERY

## 2024-06-25 PROCEDURE — 97161 PT EVAL LOW COMPLEX 20 MIN: CPT

## 2024-06-25 PROCEDURE — C1769 GUIDE WIRE: HCPCS | Performed by: ORTHOPAEDIC SURGERY

## 2024-06-25 PROCEDURE — 94761 N-INVAS EAR/PLS OXIMETRY MLT: CPT

## 2024-06-25 PROCEDURE — 27200651 HC AIRWAY, LMA: Performed by: ANESTHESIOLOGY

## 2024-06-25 PROCEDURE — 25000003 PHARM REV CODE 250: Performed by: ANESTHESIOLOGY

## 2024-06-25 PROCEDURE — 25000003 PHARM REV CODE 250: Performed by: HOSPITALIST

## 2024-06-25 PROCEDURE — 37000009 HC ANESTHESIA EA ADD 15 MINS: Performed by: ORTHOPAEDIC SURGERY

## 2024-06-25 PROCEDURE — 97110 THERAPEUTIC EXERCISES: CPT

## 2024-06-25 PROCEDURE — 97116 GAIT TRAINING THERAPY: CPT

## 2024-06-25 PROCEDURE — 99900031 HC PATIENT EDUCATION (STAT)

## 2024-06-25 PROCEDURE — 36000713 HC OR TIME LEV V EA ADD 15 MIN: Performed by: ORTHOPAEDIC SURGERY

## 2024-06-25 PROCEDURE — 94799 UNLISTED PULMONARY SVC/PX: CPT | Mod: XB

## 2024-06-25 PROCEDURE — 63600175 PHARM REV CODE 636 W HCPCS: Performed by: STUDENT IN AN ORGANIZED HEALTH CARE EDUCATION/TRAINING PROGRAM

## 2024-06-25 PROCEDURE — C9290 INJ, BUPIVACAINE LIPOSOME: HCPCS | Performed by: ANESTHESIOLOGY

## 2024-06-25 DEVICE — PRIMARY TIBIAL BASEPLATE
Type: IMPLANTABLE DEVICE | Site: KNEE | Status: FUNCTIONAL
Brand: TRIATHLON

## 2024-06-25 DEVICE — PATELLA
Type: IMPLANTABLE DEVICE | Site: KNEE | Status: FUNCTIONAL
Brand: TRIATHLON

## 2024-06-25 DEVICE — CRUCIATE RETAINING FEMORAL
Type: IMPLANTABLE DEVICE | Site: KNEE | Status: FUNCTIONAL
Brand: TRIATHLON

## 2024-06-25 DEVICE — INSERT TIBIAL SZ 3 9MM: Type: IMPLANTABLE DEVICE | Site: KNEE | Status: FUNCTIONAL

## 2024-06-25 DEVICE — TOBRA FULL DOSE ANTIBIOTIC BONE CEMENT, 10 PACK CATALOG NUMBER IS 6197-9-010
Type: IMPLANTABLE DEVICE | Site: KNEE | Status: FUNCTIONAL
Brand: SIMPLEX

## 2024-06-25 RX ORDER — CLINDAMYCIN PHOSPHATE 900 MG/50ML
900 INJECTION, SOLUTION INTRAVENOUS
Status: COMPLETED | OUTPATIENT
Start: 2024-06-25 | End: 2024-06-26

## 2024-06-25 RX ORDER — HYDROCODONE BITARTRATE AND ACETAMINOPHEN 5; 325 MG/1; MG/1
1 TABLET ORAL EVERY 6 HOURS PRN
Status: DISCONTINUED | OUTPATIENT
Start: 2024-06-25 | End: 2024-06-26 | Stop reason: HOSPADM

## 2024-06-25 RX ORDER — ONDANSETRON 4 MG/1
8 TABLET, ORALLY DISINTEGRATING ORAL EVERY 6 HOURS PRN
Status: DISCONTINUED | OUTPATIENT
Start: 2024-06-25 | End: 2024-06-26 | Stop reason: HOSPADM

## 2024-06-25 RX ORDER — DEXAMETHASONE SODIUM PHOSPHATE 4 MG/ML
INJECTION, SOLUTION INTRA-ARTICULAR; INTRALESIONAL; INTRAMUSCULAR; INTRAVENOUS; SOFT TISSUE
Status: DISCONTINUED | OUTPATIENT
Start: 2024-06-25 | End: 2024-06-25

## 2024-06-25 RX ORDER — LOPERAMIDE HYDROCHLORIDE 2 MG/1
2 CAPSULE ORAL CONTINUOUS PRN
Status: DISCONTINUED | OUTPATIENT
Start: 2024-06-25 | End: 2024-06-26 | Stop reason: HOSPADM

## 2024-06-25 RX ORDER — KETAMINE HCL IN 0.9 % NACL 50 MG/5 ML
SYRINGE (ML) INTRAVENOUS
Status: DISCONTINUED | OUTPATIENT
Start: 2024-06-25 | End: 2024-06-25

## 2024-06-25 RX ORDER — PHENYLEPHRINE HYDROCHLORIDE 10 MG/ML
INJECTION INTRAVENOUS
Status: DISCONTINUED | OUTPATIENT
Start: 2024-06-25 | End: 2024-06-25

## 2024-06-25 RX ORDER — SODIUM CHLORIDE 9 MG/ML
INJECTION, SOLUTION INTRAVENOUS CONTINUOUS
Status: DISCONTINUED | OUTPATIENT
Start: 2024-06-25 | End: 2024-06-25

## 2024-06-25 RX ORDER — NAPROXEN SODIUM 220 MG/1
81 TABLET, FILM COATED ORAL 2 TIMES DAILY
Status: DISCONTINUED | OUTPATIENT
Start: 2024-06-25 | End: 2024-06-26 | Stop reason: HOSPADM

## 2024-06-25 RX ORDER — FENTANYL CITRATE 50 UG/ML
100 INJECTION, SOLUTION INTRAMUSCULAR; INTRAVENOUS SEE ADMIN INSTRUCTIONS
Status: DISCONTINUED | OUTPATIENT
Start: 2024-06-25 | End: 2024-06-25 | Stop reason: HOSPADM

## 2024-06-25 RX ORDER — BUPIVACAINE HYDROCHLORIDE 5 MG/ML
INJECTION, SOLUTION EPIDURAL; INTRACAUDAL
Status: COMPLETED | OUTPATIENT
Start: 2024-06-25 | End: 2024-06-25

## 2024-06-25 RX ORDER — FAMOTIDINE 20 MG/1
20 TABLET, FILM COATED ORAL 2 TIMES DAILY
Status: DISCONTINUED | OUTPATIENT
Start: 2024-06-25 | End: 2024-06-26 | Stop reason: HOSPADM

## 2024-06-25 RX ORDER — MUPIROCIN 20 MG/G
OINTMENT TOPICAL
Status: DISCONTINUED | OUTPATIENT
Start: 2024-06-25 | End: 2024-06-25

## 2024-06-25 RX ORDER — ENOXAPARIN SODIUM 100 MG/ML
40 INJECTION SUBCUTANEOUS
Status: DISCONTINUED | OUTPATIENT
Start: 2024-06-26 | End: 2024-06-26 | Stop reason: HOSPADM

## 2024-06-25 RX ORDER — CLINDAMYCIN PHOSPHATE 900 MG/50ML
900 INJECTION, SOLUTION INTRAVENOUS
Status: COMPLETED | OUTPATIENT
Start: 2024-06-25 | End: 2024-06-25

## 2024-06-25 RX ORDER — ONDANSETRON HYDROCHLORIDE 2 MG/ML
INJECTION, SOLUTION INTRAVENOUS
Status: DISCONTINUED | OUTPATIENT
Start: 2024-06-25 | End: 2024-06-25

## 2024-06-25 RX ORDER — PROPOFOL 10 MG/ML
VIAL (ML) INTRAVENOUS
Status: DISCONTINUED | OUTPATIENT
Start: 2024-06-25 | End: 2024-06-25

## 2024-06-25 RX ORDER — SODIUM CHLORIDE, SODIUM LACTATE, POTASSIUM CHLORIDE, CALCIUM CHLORIDE 600; 310; 30; 20 MG/100ML; MG/100ML; MG/100ML; MG/100ML
INJECTION, SOLUTION INTRAVENOUS CONTINUOUS
Status: DISCONTINUED | OUTPATIENT
Start: 2024-06-25 | End: 2024-06-26 | Stop reason: HOSPADM

## 2024-06-25 RX ORDER — CELECOXIB 100 MG/1
400 CAPSULE ORAL
Status: COMPLETED | OUTPATIENT
Start: 2024-06-25 | End: 2024-06-25

## 2024-06-25 RX ORDER — LIDOCAINE HYDROCHLORIDE 20 MG/ML
INJECTION INTRAVENOUS
Status: DISCONTINUED | OUTPATIENT
Start: 2024-06-25 | End: 2024-06-25

## 2024-06-25 RX ORDER — MIDAZOLAM HYDROCHLORIDE 1 MG/ML
2 INJECTION, SOLUTION INTRAMUSCULAR; INTRAVENOUS
Status: DISCONTINUED | OUTPATIENT
Start: 2024-06-25 | End: 2024-06-25

## 2024-06-25 RX ORDER — METOCLOPRAMIDE HYDROCHLORIDE 5 MG/ML
10 INJECTION INTRAMUSCULAR; INTRAVENOUS EVERY 10 MIN PRN
Status: DISCONTINUED | OUTPATIENT
Start: 2024-06-25 | End: 2024-06-25 | Stop reason: HOSPADM

## 2024-06-25 RX ORDER — VALSARTAN 80 MG/1
160 TABLET ORAL DAILY
Status: DISCONTINUED | OUTPATIENT
Start: 2024-06-26 | End: 2024-06-26 | Stop reason: HOSPADM

## 2024-06-25 RX ORDER — DOCUSATE SODIUM 100 MG/1
100 CAPSULE, LIQUID FILLED ORAL EVERY 12 HOURS
Status: DISCONTINUED | OUTPATIENT
Start: 2024-06-25 | End: 2024-06-26 | Stop reason: HOSPADM

## 2024-06-25 RX ORDER — SODIUM CHLORIDE 0.9 % (FLUSH) 0.9 %
10 SYRINGE (ML) INJECTION
Status: DISCONTINUED | OUTPATIENT
Start: 2024-06-25 | End: 2024-06-26 | Stop reason: HOSPADM

## 2024-06-25 RX ORDER — LIDOCAINE HYDROCHLORIDE 10 MG/ML
1 INJECTION, SOLUTION EPIDURAL; INFILTRATION; INTRACAUDAL; PERINEURAL ONCE
Status: DISCONTINUED | OUTPATIENT
Start: 2024-06-25 | End: 2024-06-25

## 2024-06-25 RX ORDER — SODIUM CHLORIDE, SODIUM LACTATE, POTASSIUM CHLORIDE, CALCIUM CHLORIDE 600; 310; 30; 20 MG/100ML; MG/100ML; MG/100ML; MG/100ML
INJECTION, SOLUTION INTRAVENOUS CONTINUOUS
Status: DISCONTINUED | OUTPATIENT
Start: 2024-06-25 | End: 2024-06-25

## 2024-06-25 RX ORDER — ACETAMINOPHEN 500 MG
1000 TABLET ORAL
Status: COMPLETED | OUTPATIENT
Start: 2024-06-25 | End: 2024-06-25

## 2024-06-25 RX ORDER — FENTANYL CITRATE 50 UG/ML
25 INJECTION, SOLUTION INTRAMUSCULAR; INTRAVENOUS EVERY 5 MIN PRN
Status: DISCONTINUED | OUTPATIENT
Start: 2024-06-25 | End: 2024-06-25 | Stop reason: HOSPADM

## 2024-06-25 RX ADMIN — DOCUSATE SODIUM 100 MG: 100 CAPSULE, LIQUID FILLED ORAL at 08:06

## 2024-06-25 RX ADMIN — FENTANYL CITRATE 100 MCG: 50 INJECTION, SOLUTION INTRAMUSCULAR; INTRAVENOUS at 10:06

## 2024-06-25 RX ADMIN — BUPIVACAINE HYDROCHLORIDE 10 ML: 5 INJECTION, SOLUTION EPIDURAL; INTRACAUDAL; PERINEURAL at 10:06

## 2024-06-25 RX ADMIN — SODIUM CHLORIDE, POTASSIUM CHLORIDE, SODIUM LACTATE AND CALCIUM CHLORIDE: 600; 310; 30; 20 INJECTION, SOLUTION INTRAVENOUS at 03:06

## 2024-06-25 RX ADMIN — ROPIVACAINE HYDROCHLORIDE: 5 INJECTION, SOLUTION EPIDURAL; INFILTRATION; PERINEURAL at 11:06

## 2024-06-25 RX ADMIN — LIDOCAINE HYDROCHLORIDE 80 MG: 20 INJECTION, SOLUTION INTRAVENOUS at 11:06

## 2024-06-25 RX ADMIN — TRANEXAMIC ACID 1000 MG: 100 INJECTION, SOLUTION INTRAVENOUS at 11:06

## 2024-06-25 RX ADMIN — FENTANYL CITRATE 100 MCG: 50 INJECTION, SOLUTION INTRAMUSCULAR; INTRAVENOUS at 02:06

## 2024-06-25 RX ADMIN — FENTANYL CITRATE 25 MCG: 50 INJECTION, SOLUTION INTRAMUSCULAR; INTRAVENOUS at 11:06

## 2024-06-25 RX ADMIN — MUPIROCIN 1 G: 20 OINTMENT TOPICAL at 09:06

## 2024-06-25 RX ADMIN — FENTANYL CITRATE 50 MCG: 50 INJECTION, SOLUTION INTRAMUSCULAR; INTRAVENOUS at 12:06

## 2024-06-25 RX ADMIN — HYDROCODONE BITARTRATE AND ACETAMINOPHEN 1 TABLET: 5; 325 TABLET ORAL at 05:06

## 2024-06-25 RX ADMIN — DEXAMETHASONE SODIUM PHOSPHATE 8 MG: 4 INJECTION, SOLUTION INTRA-ARTICULAR; INTRALESIONAL; INTRAMUSCULAR; INTRAVENOUS; SOFT TISSUE at 11:06

## 2024-06-25 RX ADMIN — PHENYLEPHRINE HYDROCHLORIDE 100 MCG: 10 INJECTION INTRAVENOUS at 11:06

## 2024-06-25 RX ADMIN — SODIUM CHLORIDE, SODIUM GLUCONATE, SODIUM ACETATE, POTASSIUM CHLORIDE AND MAGNESIUM CHLORIDE: 526; 502; 368; 37; 30 INJECTION, SOLUTION INTRAVENOUS at 11:06

## 2024-06-25 RX ADMIN — PHENYLEPHRINE HYDROCHLORIDE 0.2 MCG/KG/MIN: 10 INJECTION INTRAVENOUS at 11:06

## 2024-06-25 RX ADMIN — ONDANSETRON 4 MG: 2 INJECTION INTRAMUSCULAR; INTRAVENOUS at 11:06

## 2024-06-25 RX ADMIN — CLINDAMYCIN PHOSPHATE 900 MG: 900 INJECTION, SOLUTION INTRAVENOUS at 06:06

## 2024-06-25 RX ADMIN — MIDAZOLAM HYDROCHLORIDE 2 MG: 1 INJECTION, SOLUTION INTRAMUSCULAR; INTRAVENOUS at 10:06

## 2024-06-25 RX ADMIN — SODIUM CHLORIDE, SODIUM GLUCONATE, SODIUM ACETATE, POTASSIUM CHLORIDE AND MAGNESIUM CHLORIDE: 526; 502; 368; 37; 30 INJECTION, SOLUTION INTRAVENOUS at 09:06

## 2024-06-25 RX ADMIN — ACETAMINOPHEN 1000 MG: 500 TABLET ORAL at 09:06

## 2024-06-25 RX ADMIN — FAMOTIDINE 20 MG: 20 TABLET, FILM COATED ORAL at 08:06

## 2024-06-25 RX ADMIN — Medication 15 MG: at 11:06

## 2024-06-25 RX ADMIN — ONDANSETRON 8 MG: 4 TABLET, ORALLY DISINTEGRATING ORAL at 05:06

## 2024-06-25 RX ADMIN — PROPOFOL 120 MG: 10 INJECTION, EMULSION INTRAVENOUS at 11:06

## 2024-06-25 RX ADMIN — ASPIRIN 81 MG CHEWABLE TABLET 81 MG: 81 TABLET CHEWABLE at 08:06

## 2024-06-25 RX ADMIN — MIDAZOLAM HYDROCHLORIDE 1 MG: 1 INJECTION, SOLUTION INTRAMUSCULAR; INTRAVENOUS at 11:06

## 2024-06-25 RX ADMIN — BUPIVACAINE 20 ML: 13.3 INJECTION, SUSPENSION, LIPOSOMAL INFILTRATION at 10:06

## 2024-06-25 RX ADMIN — GLYCOPYRROLATE 0.2 MG: 0.2 INJECTION, SOLUTION INTRAMUSCULAR; INTRAVITREAL at 11:06

## 2024-06-25 RX ADMIN — CLINDAMYCIN PHOSPHATE 900 MG: 18 INJECTION, SOLUTION INTRAVENOUS at 11:06

## 2024-06-25 RX ADMIN — CELECOXIB 400 MG: 100 CAPSULE ORAL at 09:06

## 2024-06-25 NOTE — OP NOTE
[facility]  Orthopedic Surgery  Operative Note    SUMMARY     Date of Procedure: 6/25/2024     Procedure: Procedure(s) (LRB):  ROBOTIC ARTHROPLASTY, KNEE, TOTAL (Right)       Surgeons and Role:     * Rod Orellana MD - Primary    Assistant: Romeo HOLDER    Pre-Operative Diagnosis: Primary osteoarthritis of right knee [M17.11]  Pre-op testing [Z01.818]    Post-Operative Diagnosis: Post-Op Diagnosis Codes:     * Primary osteoarthritis of right knee [M17.11]     * Pre-op testing [Z01.818]    Anesthesia: General    Complications: No    Estimated Blood Loss (EBL): 10 mL           Implants:   Implant Name Type Inv. Item Serial No.  Lot No. LRB No. Used Action   PIN BONE 4 X 140MM STERILE - [id number]  PIN BONE 4 X 140MM STERILE  [employer] [id number] Right 1 Implanted and Explanted   PIN BONE [id number] - [id number]  PIN BONE [id number]  [employer]RY[employer]. [id number] Right 1 Implanted and Explanted   FEMORAL CRUC RTN SAEG SZ 3 RT - [id number]  FEMORAL CRUC RTN SAGE SZ 3 RT  [employer]. [id number] Right 1 Implanted   CEMENT BONE ANTIBIO SIMPLEX P - [id number]  CEMENT BONE ANTIBIO SIMPLEX P  [employer]. [id number] Right 1 Implanted   PATELLA TRIATHLON 29X8 SYMTRC - [id number]  PATELLA TRIATHLON 29X8 SYMTRC  [employer]. [id number] Right 1 Implanted   BASEPLATE TIB SAGE PRIM SZ 3 - [id number]  BASEPLATE TIB SAGE PRIM SZ 3  [employer]. [id number] Right 1 Implanted   INSERT TIBIAL SZ 3 9MM - [id number]  INSERT TIBIAL SZ 3 9MM  [employer] [id number] Right 1 Implanted       Tourniquet time: 52min at 300mmHg    Specimens:   Specimen (24h ago, onward)      None                    Condition: Good    Disposition: PACU - hemodynamically stable.    Attestation: I was present and scrubbed for the entire procedure.    INDICATIONS FOR THE PROCEDURE: A 68F with a history of chronic   Right knee arthritis, had failed all conservative measures including cortisone    injections, PT, viscosupplementation and activity modification. After a long   discussion, the patient wished to proceed with the procedure above.     PROCEDURE IN DETAIL: Risks, benefits and alternatives of the procedure were   explained to the patient including, but not limited to damage to nerves,   arteries or blood vessels. Also explained risk of infection, stiffness, DVT, PE,   polyethylene wear as well as anesthetic complications including seizure, stroke,   heart attack and death, understood this and signed informed consent. The   patient's Right knee was marked prior to coming to the Operating Room. The patient was brought to the operating room, placed on the operating table in a supine position.A  formal timeout was done in which correct patient, procedure and op site were all   correctly identified and confirmed by the entire operating team.  900mg Clinda was given prior to surgical incision. Spinal anesthesia was induced. The patient'sRight  lower extremity was prepped and   draped in normal sterile fashion. TheRight  leg was exsanguinated with an   Esmarch. Tourniquet was inflated up 300 mmHg. Standard anterior approach to   the knee was made. Medial parapatellar arthrotomy was made, leaving about 1/8   inch of tissue for later repair. Proximal medial tibial release was performed,   making sure not to release any of the MCL. We then   everted the patella and reflexed the knee. Fat pad was excised as well as some   of the ACL. Soft tissue off the distal anterior femur was removed for   visualization, so as to help prevent notching.  We started off by placing our femoral pins.  Two pins were placed about 2 centimeters proximal and 1 centimeter anterior to the medial epicondyle.  We then retracted down to the level of the tibial tubercle.    Two pins were placed just short of bicortically in the tibia.  We then hooked up our arays to our pins.  We placed 2 checkpoints.  One in the tibia and 1 in the  femur.  We then took the leg through range of motion.  We then began by marking off our bony points.  We did this 1st on the femur and then on the tibia.  After this we did ligament balancing of the knee 1st extension and then in flexion by using some spoons and an osteotome.  We then adjusted our bone cuts on the computer and once we liked our plan began by making our cuts.  The AVdirect robotic assisted cutting arm was then brought in and then we made our femoral cuts and then turned our attention to the tibia.  Tibial cuts were made.  All of bone was removed as well as excess soft tissue.  Posterior osteophytes removed as well.  We then began to trial.  We placed a size 3 femur and a size 3 tibia with a size 9 polyethylene.  We were within 1 millimeter between our medial and lateral compartments in both flexion and extension.        We turned our attention to patella, caliper was used on the patella where it   measured 22. We set guide at 14 and made our 8-mm cut for polyethylene component, 29 was selected. Then drill holes were placed and the patellar button was placed.   This had good tracking. No need for a lateral release and with no hand tests,   it stayed centered the whole time. We then marked our tibial rotation. We then drilled our femoral lugs.  We then   removed everything except the size 3 tibial tray. We then checked our tibial tray   with a drop da again and then marked our rotation and pinned it into place then   drilled, and then punched for our keel. Robot and pins were all removed. We then started preparing final   components on the back table. Anterior, medial and lateral structures were injected with our local   Cocktail. Bony surfaces   copiously irrigated with Pulsavac and then thoroughly dried. Once the cement   was the appropriate consistency, first the tibia and then the femur were   cemented into place, removing excess cement. A 9 trial was placed. The knee   was held in compression and  then the patella was placed. Cement was allowed to   cure. Once the cement was cured, we then removed excess cement. Again trialed   one final time, again seeing a 9. We then tapped into place the   final 9 meniscal bearing into place. After this was done, we then did our   diluted iodine soak for 3 minutes and then proceeded with closing.  Arthrotomy was closed using our StrataFix.   Subcutaneous tissue was closed using 2-0 Vicryl and skin was using a running 3-0   StrataFix and Dermabond.Instrument, sponge, and needle counts were correct prior to wound closure and at the conclusion of the case.  Sterile dressing was applied.   They were extubated, awakened and transferred from the Operating Room to the   Recovery Room in stable condition.

## 2024-06-25 NOTE — ANESTHESIA PROCEDURE NOTES
Peripheral Block    Patient location during procedure: pre-op   Block not for primary anesthetic.  Reason for block: at surgeon's request and post-op pain management   Post-op Pain Location: right knee pain   Start time: 6/25/2024 10:25 AM  Timeout: 6/25/2024 10:20 AM   End time: 6/25/2024 10:30 AM    Staffing  Authorizing Provider: Jurgen Petty MD  Performing Provider: Jurgen Petty MD    Staffing  Performed by: Jurgen Petty MD  Authorized by: Jurgen Petty MD    Preanesthetic Checklist  Completed: patient identified, IV checked, site marked, risks and benefits discussed, surgical consent, monitors and equipment checked, pre-op evaluation and timeout performed  Peripheral Block  Patient position: supine  Prep: ChloraPrep  Patient monitoring: heart rate, cardiac monitor, continuous pulse ox, continuous capnometry and frequent blood pressure checks  Block type: adductor canal  Laterality: right  Injection technique: single shot  Needle  Needle type: Stimuplex   Needle gauge: 21 G  Needle length: 4 in  Needle localization: anatomical landmarks and ultrasound guidance   -ultrasound image captured on disc.  Assessment  Injection assessment: negative aspiration, negative parasthesia and local visualized surrounding nerve  Paresthesia pain: none  Heart rate change: no  Slow fractionated injection: yes    Medications:    Medications: BUPivacaine liposome (PF) 1.3 % (13.3 mg/mL) suspension - Injection   20 mL - 6/25/2024 10:25:00 AM  bupivacaine (pf) (MARCAINE) injection 0.5% - Perineural   10 mL - 6/25/2024 10:25:00 AM    Additional Notes  VSS.  [facility] RN monitoring vitals throughout procedure.  Patient tolerated procedure well.

## 2024-06-25 NOTE — TRANSFER OF CARE
"Anesthesia Transfer of Care Note    Patient: Abi Duran    Procedure(s) Performed: Procedure(s) (LRB):  ROBOTIC ARTHROPLASTY, KNEE, TOTAL (Right)    Patient location: PACU    Anesthesia Type: general    Transport from OR: Transported from OR on 2-3 L/min O2 by NC with adequate spontaneous ventilation    Post pain: adequate analgesia    Post assessment: no apparent anesthetic complications    Post vital signs: stable    Level of consciousness: responds to stimulation and lethargic    Nausea/Vomiting: no nausea/vomiting    Complications: none    Transfer of care protocol was followed      Last vitals: Visit Vitals  BP (!) 180/77   Pulse 78   Temp 36.2 °C (97.2 °F) (Skin)   Resp 16   Ht 5' 3" (1.6 m)   Wt 73.9 kg (163 lb)   SpO2 100%   Breastfeeding No   BMI 28.87 kg/m²     "

## 2024-06-25 NOTE — HPI
Patient with hypertension and GERD admitted after right total knee arthroplasty with Dr. Orellana.  She reports pain is controlled at this time.  She denies shortness or breath or chest pain.  She denies nausea, vomiting, abdominal pain.  She does note that she has nausea with narcotics in the past.  Patient will be monitored by the medicine service postoperatively.

## 2024-06-25 NOTE — PROGRESS NOTES
Pt prepared for surgery. Pt resting in bed, with family/friend at bedside. Family signed up for text messaging. Belongings kept by   IS teaching performed. Fall risk and Allergy armband placed.Limb alert band to left arm for CRPS Pt states no IV's to left arm  SCDs on.

## 2024-06-25 NOTE — DISCHARGE INSTRUCTIONS
"Discharge Instructions: After Your Surgery/Procedure  Youve just had surgery. During surgery you were given medicine called anesthesia to keep you relaxed and free of pain. After surgery you may have some pain or nausea. This is common. Here are some tips for feeling better and getting well after surgery.     Stay on schedule with your medication.   Going home  Your doctor or nurse will show you how to take care of yourself when you go home. He or she will also answer your questions. Have an adult family member or friend drive you home.      For your safety we recommend these precaution for the first 24 hours after your procedure:  Do not drive or use heavy equipment.  Do not make important decisions or sign legal papers.  Do not drink alcohol.  Have someone stay with you, if needed. He or she can watch for problems and help keep you safe.  Your concentration, balance, coordination, and judgement may be impaired for many hours after anesthesia.  Use caution when ambulating or standing up.     You may feel weak and "washed out" after anesthesia and surgery.      Subtle residual effects of general anesthesia or sedation with regional / local anesthesia can last more than 24 hours.  Rest for the remainder of the day or longer if your Doctor/Surgeon has advised you to do so.  Although you may feel normal within the first 24 hours, your reflexes and mental ability may be impaired without you realizing it.  You may feel dizzy, lightheaded or sleepy for 24 hours or longer.      Be sure to go to all follow-up visits with your doctor. And rest after your surgery for as long as your doctor tells you to.  Coping with pain  If you have pain after surgery, pain medicine will help you feel better. Take it as told, before pain becomes severe. Also, ask your doctor or pharmacist about other ways to control pain. This might be with heat, ice, or relaxation. And follow any other instructions your surgeon or nurse gives you.  Tips " for taking pain medicine  To get the best relief possible, remember these points:  Pain medicines can upset your stomach. Taking them with a little food may help.  Most pain relievers taken by mouth need at least 20 to 30 minutes to start to work.  Taking medicine on a schedule can help you remember to take it. Try to time your medicine so that you can take it before starting an activity. This might be before you get dressed, go for a walk, or sit down for dinner.  Constipation is a common side effect of pain medicines. Call your doctor before taking any medicines such as laxatives or stool softeners to help ease constipation. Also ask if you should skip any foods. Drinking lots of fluids and eating foods such as fruits and vegetables that are high in fiber can also help. Remember, do not take laxatives unless your surgeon has prescribed them.  Drinking alcohol and taking pain medicine can cause dizziness and slow your breathing. It can even be deadly. Do not drink alcohol while taking pain medicine.  Pain medicine can make you react more slowly to things. Do not drive or run machinery while taking pain medicine.  Your health care provider may tell you to take acetaminophen to help ease your pain. Ask him or her how much you are supposed to take each day. Acetaminophen or other pain relievers may interact with your prescription medicines or other over-the-counter (OTC) drugs. Some prescription medicines have acetaminophen and other ingredients. Using both prescription and OTC acetaminophen for pain can cause you to overdose. Read the labels on your OTC medicines with care. This will help you to clearly know the list of ingredients, how much to take, and any warnings. It may also help you not take too much acetaminophen. If you have questions or do not understand the information, ask your pharmacist or health care provider to explain it to you before you take the OTC medicine.  Managing nausea  Some people have an  upset stomach after surgery. This is often because of anesthesia, pain, or pain medicine, or the stress of surgery. These tips will help you handle nausea and eat healthy foods as you get better. If you were on a special food plan before surgery, ask your doctor if you should follow it while you get better. These tips may help:  Do not push yourself to eat. Your body will tell you when to eat and how much.  Start off with clear liquids and soup. They are easier to digest.  Next try semi-solid foods, such as mashed potatoes, applesauce, and gelatin, as you feel ready.  Slowly move to solid foods. Dont eat fatty, rich, or spicy foods at first.  Do not force yourself to have 3 large meals a day. Instead eat smaller amounts more often.  Take pain medicines with a small amount of solid food, such as crackers or toast, to avoid nausea.     Call your surgeon if  You still have pain an hour after taking medicine. The medicine may not be strong enough.  You feel too sleepy, dizzy, or groggy. The medicine may be too strong.  You have side effects like nausea, vomiting, or skin changes, such as rash, itching, or hives.       If you have obstructive sleep apnea  You were given anesthesia medicine during surgery to keep you comfortable and free of pain. After surgery, you may have more apnea spells because of this medicine and other medicines you were given. The spells may last longer than usual.   At home:  Keep using the continuous positive airway pressure (CPAP) device when you sleep. Unless your health care provider tells you not to, use it when you sleep, day or night. CPAP is a common device used to treat obstructive sleep apnea.  Talk with your provider before taking any pain medicine, muscle relaxants, or sedatives. Your provider will tell you about the possible dangers of taking these medicines.  © 5611-6313 [employer]. [address] [place] [address], [place] [address]. All rights reserved. This information is  not intended as a substitute for professional medical care. Always follow your healthcare professional's instructions.                      Using an Incentive Spirometer    An incentive spirometer is a device that helps you do deep breathing exercises. These exercises expand your lungs, aid in circulation, and help prevent pneumonia. Deep breathing exercises also help you breathe better and improve the function of your lungs by:  Keeping your lungs clear  Strengthening your breathing muscles  Helping prevent respiratory complications or problems  The incentive spirometer gives you a way to take an active part in recover. A nurse or therapist will teach you breathing exercises. To do these exercises, you will breathe in through your mouth and not your nose. The incentive spirometer only works correctly if you breathe in through your mouth.  Steps to clear lungs  Step 1. Exhale normally. Then, inhale normally.  Relax and breathe out.  Step 2. Place your lips tightly around the mouthpiece.  Make sure the device is upright and not tilted.  Step 3. Inhale as much air as you can through the mouthpiece (don't breath through your nose).  Inhale slowly and deeply.  Hold your breath long enough to keep the balls or disk raised for at least 3 to 5 seconds, or as instructed by your healthcare provider.  Some spirometers have an indicator to let you know that you are breathing in too fast. If the indicator goes off, breathe in more slowly.  Step 4. Repeat the exercise regularly.  Do this exercise every hour while you're awake, or as instructed by your healthcare provider.  If you were taught deep breathing and coughing exercises, do them regularly as instructed by your healthcare provider.                         Post op instructions for prevention of DVT  What is deep vein thrombosis?  Deep vein thrombosis (DVT) is the medical term for blood clots in the deep veins of the leg.  These blood clots can be dangerous.  A DVT can  block a blood vessel and keep blood from getting where it needs to go.  Another problem is that the clot can travel to other parts of the body such as the lungs.  A clot that travels to the lungs is called a pulmonary embolus (PE) and can cause serious problems with breathing which can lead to death.  Am I at risk for DVT/PE?  If you are not very active, you are at risk of DVT.  Anyone confined to bed, sitting for long periods of time, recovering from surgery, etc. increases the risk of DVT.  Other risk factors are cancer diagnosis, certain medications, estrogen replacement in any form,older age, obesity, pregnancy, smoking, history of clotting disorders, and dehydration.  How will I know if I have a DVT?  Swelling in the lower leg  Pain  Warmth, redness, hardness or bulging of the vein  If you have any of these symptoms, call your doctors office right away.  Some people will not have any symptoms until the clot moves to the lungs.  What are the symptoms of a PE?  Panting, shortness of breath, or trouble breathing  Sharp, knife-like chest pain when you breathe  Coughing or coughing up blood  Rapid heartbeat  If you have any of these symptoms or get worse quickly, call 911 for emergency treatment.  How can I prevent a DVT?  Avoid long periods of inactivity and dont cross your legs--get up and walk around every hour or so.  Stay active--walking after surgery is highly encouraged.  This means you should get out of the house and walk in the neighborhood.  Going up and down stairs will not impair healing (unless advised against such activity by your doctor).    Drink plenty of noncaffeinated, nonalcoholic fluids each day to prevent dehydration.  Wear special support stockings, if they have been advised by your doctor.  If you travel, stop at least once an hour and walk around.  Avoid smoking (assistance with stopping is available through your healthcare provider)  Always notify your doctor if you are not able to follow  the post operative instructions that are given to you at the time of discharge.  It may be necessary to prescribe one of the medications available to prevent DVT.                  We hope your stay was comfortable as you heal now, mend and rest.            For we have enjoyed taking care of you by giving your our best.    And as you get better, by regaining your health and strength;   We count it as a privilege to have served you and hope your time at [facility] was well spent.      Thank  You!!!                Exparel(bupivacaine) has been injected to provide approximately 72 hours of reduced pain after your surgery.  Do not remove the bracelet for five days.  Report to your doctor as soon as possible if you experience any of the following:   Restlessness   Anxiety   Speech problems    Lightheadedness   Numbness and tingling of the mouth and lips   Seizures    Metallic taste   Blurred vision   Tremors    Twitching   Depression   Extreme drowsiness  Avoid additional use of local anesthetics (such as dental procedures) for five days (96 hours).

## 2024-06-25 NOTE — ANESTHESIA PREPROCEDURE EVALUATION
06/25/2024  Abi Duran is a 68 y.o., female.      Pre-op Assessment    I have reviewed the Patient Summary Reports.     I have reviewed the Nursing Notes. I have reviewed the NPO Status.   I have reviewed the Medications.     Review of Systems  Anesthesia Hx:  No problems with previous Anesthesia                Cardiovascular:     Hypertension   CAD                  Coronary Artery Disease:                            Hypertension         Pulmonary:  Pulmonary Normal                       Hepatic/GI:     GERD      Gerd          Neurological:      Headaches      Dx of Headaches                               Physical Exam  General: Well nourished    Airway:  Mallampati: II   Mouth Opening: Normal  TM Distance: Normal  Neck ROM: Normal ROM        Anesthesia Plan  Type of Anesthesia, risks & benefits discussed:    Anesthesia Type: Regional, Gen Supraglottic Airway, Gen ETT  Intra-op Monitoring Plan: Standard ASA Monitors  Post Op Pain Control Plan: multimodal analgesia and peripheral nerve block  Informed Consent: Informed consent signed with the Patient and all parties understand the risks and agree with anesthesia plan.  All questions answered.   ASA Score: 2  Anesthesia Plan Notes: Pt refuses spinal anesthetic    Ready For Surgery From Anesthesia Perspective.     .

## 2024-06-25 NOTE — ASSESSMENT & PLAN NOTE
Chronic, controlled. Latest blood pressure and vitals reviewed-     Temp:  [97.2 °F (36.2 °C)-99.1 °F (37.3 °C)]   Pulse:  []   Resp:  [12-38]   BP: (120-220)/()   SpO2:  [94 %-100 %] .   Home meds for hypertension were reviewed and noted below.   Hypertension Medications               valsartan (DIOVAN) 160 MG tablet Take 1 tablet (160 mg total) by mouth once daily.            While in the hospital, will manage blood pressure as follows; Continue home antihypertensive regimen    Will utilize p.r.n. blood pressure medication only if patient's blood pressure greater than 180/110 and she develops symptoms such as worsening chest pain or shortness of breath.

## 2024-06-25 NOTE — PLAN OF CARE
Problem: Physical Therapy  Goal: Physical Therapy Goal  Description: Goals to be met by: 2024     Patient will increase functional independence with mobility by performin. Supine to sit with Contact Guard Assistance  2. Sit to stand transfer with Contact Guard Assistance  3. Bed to chair transfer with Contact Guard Assistance using Rolling Walker  4. Gait  x 250 feet with Contact Guard Assistance using Rolling Walker.   5. Lower extremity exercise program x20 reps   Outcome: Progressing   PT eval and treat. Thera ex in supine. Gait with RW 120ft min assist and another person following with chair, 1 seated rest. LIT

## 2024-06-25 NOTE — H&P
Past Medical History:   Diagnosis Date    Allergy      Arthritis      GERD (gastroesophageal reflux disease)      Ulcer                 Past Surgical History:   Procedure Laterality Date    CHOLECYSTECTOMY        EYE SURGERY        HYSTERECTOMY        KNEE SURGERY        TONSILLECTOMY, ADENOIDECTOMY, BILATERAL MYRINGOTOMY AND TUBES        TUBAL LIGATION                  Current Outpatient Medications   Medication Sig    BORON ORAL Take by mouth.      No current facility-administered medications for this visit.               Review of patient's allergies indicates:   Allergen Reactions    Amoxicillin Nausea Only       Cannot tolerate side effects    Erythromycin Nausea Only       Other reaction(s): Nausea    Oxycodone         Other reaction(s): Swelling  Other reaction(s): Flushing (skin)    Phenergan [promethazine] Other (See Comments)       Other reaction(s): Muscle pain  Muscle pain    Sulfa (sulfonamide antibiotics)      Doxycycline Nausea Only    Imitrex [sumatriptan succinate] Swelling and Palpitations       Other reaction(s): swelling  Other reaction(s): chest pains               Family History   Problem Relation Age of Onset    Heart failure Mother      Stroke Mother      Macular degeneration Mother      Hypertension Mother      Heart attack Father      Emphysema Father      Lung cancer Father      Heart attack Sister      Breast cancer Maternal Grandmother      Heart attack Maternal Grandmother      Blindness Maternal Grandmother      Cataracts Maternal Grandmother      Breast cancer Paternal Grandmother      Heart attack Paternal Grandmother      Blindness Paternal Grandmother      Cataracts Paternal Grandmother      Diabetes Paternal Grandmother      Amblyopia Neg Hx      Cancer Neg Hx      Glaucoma Neg Hx      Retinal detachment Neg Hx      Strabismus Neg Hx      Thyroid disease Neg Hx           Social History           Socioeconomic History    Marital status:    Tobacco Use    Smoking status:  Never    Smokeless tobacco: Never   Substance and Sexual Activity    Alcohol use: No    Drug use: No      Social Determinants of Health           Financial Resource Strain: Low Risk  (9/19/2022)     Overall Financial Resource Strain (CARDIA)      Difficulty of Paying Living Expenses: Not hard at all   Food Insecurity: No Food Insecurity (9/19/2022)     Hunger Vital Sign      Worried About Running Out of Food in the Last Year: Never true      Ran Out of Food in the Last Year: Never true   Transportation Needs: No Transportation Needs (9/19/2022)     PRAPARE - Transportation      Lack of Transportation (Medical): No      Lack of Transportation (Non-Medical): No   Physical Activity: Insufficiently Active (9/19/2022)     Exercise Vital Sign      Days of Exercise per Week: 2 days      Minutes of Exercise per Session: 20 min   Stress: No Stress Concern Present (9/19/2022)     [employer] [place] [employer] - Occupational Stress Questionnaire      Feeling of Stress : Not at all   Social Connections: Unknown (9/19/2022)     Social Connection and Isolation Panel [NHANES]      Frequency of Communication with Friends and Family: More than three times a week      Frequency of Social Gatherings with Friends and Family: Twice a week      Active Member of Clubs or Organizations: Yes      Attends Club or Organization Meetings: More than 4 times per year      Marital Status:    Housing Stability: Low Risk  (9/19/2022)     Housing Stability Vital Sign      Unable to Pay for Housing in the Last Year: No      Number of Places Lived in the Last Year: 2      Unstable Housing in the Last Year: No         Chief Complaint:       Chief Complaint   Patient presents with    Right Knee - Pain         History of present illness:  68-year-old female seen for right knee pain.  I saw her several years ago for the same knee and she had some chondral defects on the tibia with some early arthritis.  Patient was doing pretty well until  about 3 months ago.  Started to work out in June.  Had a little pain along the medial aspect of her knees so she stopped for about a month.  She then tried again and the pain came back again so she quit trying to exercise.  She is tried ice, Tylenol, brace and Voltaren gel without any real relief.  Patient can not take NSAIDs because of a stomach ulcer.  MRI confirmed a medial tibial stress reaction.  She is been wearing the medial  brace and is doing better.  Still has some pain and swelling after prolonged activity.        Review of Systems:  Musculoskeletal:  See HPI           Physical Examination:     Vital Signs:  There were no vitals filed for this visit.     Body mass index is 29.05 kg/m².     This a well-developed, well nourished patient in no acute distress.  They are alert and oriented and cooperative to examination.  Pt. walks without an antalgic gait.       Examination of the right knee shows no rashes or erythema. There are no masses ecchymosis or effusion. Patient has full range of motion from 0-130°. Patient is nontender to palpation over lateral joint line and moderately tender to palpation over the medial joint line.  Tender along the proximal medial tibia as well.  Knee is stable to varus and valgus stress. 5 out of 5 motor strength. Palpable distal pulses. Intact light touch sensation. Negative Patellofemoral crepitus     Heart is regular rate without obvious murmurs   Normal respiratory effort without audible wheezing  Abdomen is soft and nontender         X-rays:  X-rays of the right knee are review which show medial narrowing     MRI of the right knee is reviewed and interpreted:Moderate stress reaction versus osseous contusion of the anteromedial tibial plateau.  Probable chronic degenerative tear of the anterior horn of the medial meniscus.  Osteoarthritis, most severe within the medial compartment where a large region of full-thickness chondral loss is present.     Assessment::   Right tibial insufficiency fracture  Right knee osteoarthritis     Plan:  I reviewed the findings with her and her  today.  I agreed to inject her right knee today before her cruise. We talked about knee replacement and she would like to wait till the start of the summer to do it.  Plan is for right robotic assisted total knee arthroplasty.  Patient would like to be asleep before the nerve block is performed.  I agreed to inject her knee prior to a cruise in a month.   Risks, benefits, and alternatives to the procedure were explained to the patient including but not limited to damage to nerves, arteries, blood vessels, bones, tendons, ligaments, stiffness, instability, infection, permanent limb dysfunction, DVT, PE, as well as general anesthetic complications including seizure, stroke, heart attack and even death. The patient understood these risks and wished to proceed and signed the informed consent.      The mobility limitation cannot be sufficiently resolved by the use of a cane. Patient's functional mobility deficit can be sufficiently resolved with the use of a (Rolling Walker or Walker). Patient's mobility limitation significantly impairs their ability to participate in one of more activities of daily living. The use of a (RW or Walker) will significantly improve the patient's ability to participate in MRADLS and the patient will use it on regular basis in the home.     All previous pertinent notes including ER visits, physical therapy visits, other orthopedic visits as well as other care for the same musculoskeletal problem were reviewed.  All pertinent lab values and previous imaging was reviewed pertinent to the current visit.     This note was created using [email or web address] voice recognition software that occasionally misinterpreted phrases or words.     Consult note is delivered via Epic messaging service.

## 2024-06-25 NOTE — PT/OT/SLP EVAL
Physical Therapy Evaluation    Patient Name:  Abi Duran   MRN:  [id number]    Recommendations:     Discharge Recommendations: Low Intensity Therapy   Discharge Equipment Recommendations: none (has Rw)   Barriers to discharge: None    Assessment:     Abi Duran is a 68 y.o. female admitted with a medical diagnosis of Primary osteoarthritis of right knee.  She presents with the following impairments/functional limitations: weakness, impaired endurance, impaired sensation, impaired functional mobility, gait instability, decreased lower extremity function, pain, decreased ROM, impaired cardiopulmonary response to activity, orthopedic precautions .    Pt seen at room 315. Pt alert, slightly anxious. Pt seen for thera ex in supine with good execution. C/o numbness RLE. Pt post general anesthesia and encouraged mobility. Pt requiring min/mod assist to sit EOB and to stand with VC on technique. Pt ambulated with RW 120ft with min assist and another person following with chair. Pt took 1 seated rest.  OOb chair post PT  Pt to benefit from low intensity therapies.    Rehab Prognosis: Good; patient would benefit from acute skilled PT services to address these deficits and reach maximum level of function.    Recent Surgery: Procedure(s) (LRB):  ROBOTIC ARTHROPLASTY, KNEE, TOTAL (Right) Day of Surgery    Plan:     During this hospitalization, patient to be seen BID to address the identified rehab impairments via gait training, therapeutic activities, therapeutic exercises and progress toward the following goals:    Plan of Care Expires:  06/30/24    Subjective   Pt stated that she is determined and will get better soon  Stated they live far away thus the admission  Chief Complaint: RLE numbness-  Patient/Family Comments/goals: get well  Pain/Comfort:  Pain Rating 1: 8/10  Location - Side 1: Right  Location 1: knee  Pain Addressed 1: Reposition, Distraction, Nurse notified    Patients cultural, spiritual, Mu-ism  conflicts given the current situation:      Living Environment:  Home with spouse  Prior to admission, patients level of function was indep but activities limited by pain with use of crutches/elastic support.  Equipment used at home: none.  DME owned (not currently used): none.  Upon discharge, patient will have assistance from family.    Objective:     Communicated with nurse Johnson prior to session.  Patient found HOB elevated with Christine BELTRAN  upon PT entry to room.    General Precautions: Standard, fall  Orthopedic Precautions:RLE weight bearing as tolerated   Braces: N/A  Respiratory Status: Room air    Exams:  Postural Exam:  Patient presented with the following abnormalities:    -       Rounded shoulders  -       Forward head  -       BMI 28.66  RLE ROM: Deficits: RK flexion ~80*  RLE Strength: Deficits: 3/5  LLE ROM: WFL  LLE Strength: WFL    Functional Mobility:  Bed Mobility:     Scooting: minimum assistance  Supine to Sit: minimum assistance  Transfers:     Sit to Stand:  minimum assistance with rolling walker  Bed to Chair: minimum assistance with  rolling walker  using  Stand Pivot  Gait: 120ft with RW min assist and another person following with chair and 1 seated rest      AM-PAC 6 CLICK MOBILITY  Total Score:16       Treatment & Education:  Patient was educated on the importance of OOB activity and functional mobility to negate negative effects of prolonged bed rest during hospitalization, safe transfers and ambulation, and D/C planning   Thera ex with AP,QS/GS,SLR,HS  Gait at hallways with RW min assist and chair following  OOb chair with legs elevated- provided with apple sauce, G crackers and PB    Patient left up in chair with all lines intact, call button in reach, chair alarm on, nurse Johnson notified, and spouse present.    GOALS:   Multidisciplinary Problems       Physical Therapy Goals          Problem: Physical Therapy    Goal Priority Disciplines Outcome Goal Variances Interventions    Physical Therapy Goal     PT, PT/OT Progressing     Description: Goals to be met by: 2024     Patient will increase functional independence with mobility by performin. Supine to sit with Contact Guard Assistance  2. Sit to stand transfer with Contact Guard Assistance  3. Bed to chair transfer with Contact Guard Assistance using Rolling Walker  4. Gait  x 250 feet with Contact Guard Assistance using Rolling Walker.   5. Lower extremity exercise program x20 reps                        History:     Past Medical History:   Diagnosis Date    Allergy     Arthritis     Coronary artery disease     GERD (gastroesophageal reflux disease)     Hypertension     Ulcer        Past Surgical History:   Procedure Laterality Date    CARDIAC SURGERY      angiogram    CHOLECYSTECTOMY      EYE SURGERY      lasik    HYSTERECTOMY      KNEE SURGERY      scope    TONSILLECTOMY, ADENOIDECTOMY, BILATERAL MYRINGOTOMY AND TUBES      TUBAL LIGATION         Time Tracking:     PT Received On: 24  PT Start Time: 1527     PT Stop Time: 1624  PT Total Time (min): 57 min     Billable Minutes: Evaluation 10, Gait Training 30, and Therapeutic Exercise 17      2024

## 2024-06-25 NOTE — SUBJECTIVE & OBJECTIVE
Past Medical History:   Diagnosis Date    Allergy     Arthritis     Coronary artery disease     GERD (gastroesophageal reflux disease)     Hypertension     Ulcer        Past Surgical History:   Procedure Laterality Date    CARDIAC SURGERY      angiogram    CHOLECYSTECTOMY      EYE SURGERY      lasik    HYSTERECTOMY      KNEE SURGERY      scope    TONSILLECTOMY, ADENOIDECTOMY, BILATERAL MYRINGOTOMY AND TUBES      TUBAL LIGATION         Review of patient's allergies indicates:   Allergen Reactions    Oxycodone Swelling     Other reaction(s):swelling of face  Other reaction(s): Flushing (skin)    Amoxicillin Nausea Only     Cannot tolerate side effects    Doxycycline Nausea Only    Erythromycin Nausea Only     Other reaction(s): Nausea    Imitrex [sumatriptan succinate] Swelling and Palpitations     Other reaction(s): swelling  Other reaction(s): chest pains    Phenergan [promethazine] Other (See Comments)     Other reaction(s): Muscle pain  Muscle pain    Sulfa (sulfonamide antibiotics) Nausea And Vomiting       No current facility-administered medications on file prior to encounter.     Current Outpatient Medications on File Prior to Encounter   Medication Sig    ondansetron (ZOFRAN-ODT) 8 MG TbDL Take 1 tablet (8 mg total) by mouth every 6 (six) hours as needed (nausea).    valsartan (DIOVAN) 160 MG tablet Take 1 tablet (160 mg total) by mouth once daily.    BORON ORAL Take by mouth.     Family History       Problem Relation (Age of Onset)    Blindness Maternal Grandmother, Paternal Grandmother    Breast cancer Maternal Grandmother, Paternal Grandmother    Cataracts Maternal Grandmother, Paternal Grandmother    Diabetes Paternal Grandmother    Emphysema Father    Heart attack Father, Sister, Maternal Grandmother, Paternal Grandmother    Heart failure Mother    Hypertension Mother    Lung cancer Father    Macular degeneration Mother    Stroke Mother          Tobacco Use    Smoking status: Never    Smokeless tobacco:  Never   Substance and Sexual Activity    Alcohol use: No    Drug use: No    Sexual activity: Yes     Partners: Male     Review of Systems   Constitutional:  Negative for chills and fever.   HENT:  Negative for congestion and rhinorrhea.    Respiratory:  Negative for cough, shortness of breath and wheezing.    Cardiovascular:  Negative for chest pain, palpitations and leg swelling.   Gastrointestinal:  Negative for abdominal distention, abdominal pain, nausea and vomiting.   Endocrine: Negative for cold intolerance and heat intolerance.   Genitourinary:  Negative for dysuria and urgency.   Musculoskeletal:  Positive for arthralgias. Negative for neck stiffness.   Skin:  Negative for rash and wound.   Neurological:  Negative for dizziness and weakness.     Objective:     Vital Signs (Most Recent):  Temp: 98.8 °F (37.1 °C) (06/25/24 1516)  Pulse: 83 (06/25/24 1516)  Resp: 18 (06/25/24 1516)  BP: (!) 127/59 (06/25/24 1516)  SpO2: 96 % (06/25/24 1516) Vital Signs (24h Range):  Temp:  [97.2 °F (36.2 °C)-99.1 °F (37.3 °C)] 98.8 °F (37.1 °C)  Pulse:  [] 83  Resp:  [12-38] 18  SpO2:  [94 %-100 %] 96 %  BP: (120-220)/() 127/59     Weight: 73.9 kg (163 lb)  Body mass index is 28.87 kg/m².     Physical Exam  Constitutional:       General: She is not in acute distress.     Appearance: She is well-developed.   HENT:      Head: Normocephalic and atraumatic.   Eyes:      Pupils: Pupils are equal, round, and reactive to light.   Cardiovascular:      Rate and Rhythm: Normal rate and regular rhythm.      Heart sounds: No murmur heard.  Pulmonary:      Effort: Pulmonary effort is normal. No respiratory distress.      Breath sounds: Normal breath sounds. No wheezing or rales.   Abdominal:      General: Bowel sounds are normal. There is no distension.      Palpations: Abdomen is soft.      Tenderness: There is no abdominal tenderness.   Musculoskeletal:      Comments: Right knee wrapped in Ace bandage.  Distal pulses intact    Skin:     General: Skin is warm and dry.      Findings: No rash.   Neurological:      Mental Status: She is alert and oriented to person, place, and time.      Cranial Nerves: No cranial nerve deficit.   Psychiatric:         Behavior: Behavior normal.              CRANIAL NERVES     CN III, IV, VI   Pupils are equal, round, and reactive to light.       Significant Labs: All pertinent labs within the past 24 hours have been reviewed.    Significant Imaging: I have reviewed all pertinent imaging results/findings within the past 24 hours.

## 2024-06-25 NOTE — ANESTHESIA POSTPROCEDURE EVALUATION
Anesthesia Post Evaluation    Patient: Abi Duran    Procedure(s) Performed: Procedure(s) (LRB):  ROBOTIC ARTHROPLASTY, KNEE, TOTAL (Right)    Final Anesthesia Type: general      Patient location during evaluation: PACU  Patient participation: Yes- Able to Participate  Level of consciousness: awake  Post-procedure vital signs: reviewed and stable  Pain management: adequate  Airway patency: patent    PONV status at discharge: No PONV  Anesthetic complications: no      Cardiovascular status: blood pressure returned to baseline  Respiratory status: unassisted  Hydration status: euvolemic  Follow-up not needed.              Vitals Value Taken Time   /59 06/25/24 1516   Temp 37.1 °C (98.8 °F) 06/25/24 1516   Pulse 83 06/25/24 1516   Resp 18 06/25/24 1516   SpO2 96 % 06/25/24 1516         Event Time   Out of Recovery 15:05:00         Pain/Penny Score: Pain Rating Prior to Med Admin: 5 (6/25/2024  2:51 PM)  Penny Score: 10 (6/25/2024  3:00 PM)

## 2024-06-25 NOTE — ASSESSMENT & PLAN NOTE
S/p right knee TKA with Dr. Orellana on 06/25/2024  Will follow postop surgical recommendations.  Encourage incentive spirometry use  Early ambulation as recommended by PT  As needed antiemetics, pain medication  DVT prophylaxis with ASA 81 mg BID  Monitor H/H

## 2024-06-25 NOTE — NURSING
Nurses Note -- 4 Eyes      6/25/2024   6:50 PM      Skin assessed during: Admit      [x] No Altered Skin Integrity Present    []Prevention Measures Documented      [] Yes- Altered Skin Integrity Present or Discovered   [] LDA Added if Not in Epic (Describe Wound)   [] New Altered Skin Integrity was Present on Admit and Documented in LDA   [] Wound Image Taken    Wound Care Consulted? No    Attending Nurse:  NATASHA Johnson     Second RN/Staff Member: JULI Hawthorne

## 2024-06-25 NOTE — PLAN OF CARE
[facility] received notification that pt was being admitted.      Per Mirela Quintero with [facility] - okay to pull RW.  [facility] delivered RW to pt at bedside and returned paperwork to the first floor DME closet.

## 2024-06-25 NOTE — PLAN OF CARE
Released per anesthesia, when criteria met. VS WDL, Resp even and unlabored. IS reviewed and pt encouraged to continue independently. Dressing dry and intact, pain relieved with pain med; pt states she only has been taking tylenol for pain, she is sensitive to pain meds and allergic to oxycodone. It made her face red. She is having trouble voiding, psychologically, she cannot go in the bed.  After transferred to [facility], she was able to release her urine in the external cath and felt much relief.

## 2024-06-25 NOTE — H&P
[facility]  History & Physical    Patient Name: Abi Duran  MRN: [id number]  Patient Class: OP- Outpatient Recovery  Admission Date: 6/25/2024  Attending Physician: Urszula Ch MD  Primary Care Provider: Andrew Perales Jr., MD         Patient information was obtained from patient and past medical records.     Subjective:     Principal Problem:Primary osteoarthritis of right knee    Chief Complaint: No chief complaint on file.       HPI: Patient with hypertension and GERD admitted after right total knee arthroplasty with Dr. Orellana.  She reports pain is controlled at this time.  She denies shortness or breath or chest pain.  She denies nausea, vomiting, abdominal pain.  She does note that she has nausea with narcotics in the past.  Patient will be monitored by the medicine service postoperatively.    Past Medical History:   Diagnosis Date    Allergy     Arthritis     Coronary artery disease     GERD (gastroesophageal reflux disease)     Hypertension     Ulcer        Past Surgical History:   Procedure Laterality Date    CARDIAC SURGERY      angiogram    CHOLECYSTECTOMY      EYE SURGERY      lasik    HYSTERECTOMY      KNEE SURGERY      scope    TONSILLECTOMY, ADENOIDECTOMY, BILATERAL MYRINGOTOMY AND TUBES      TUBAL LIGATION         Review of patient's allergies indicates:   Allergen Reactions    Oxycodone Swelling     Other reaction(s):swelling of face  Other reaction(s): Flushing (skin)    Amoxicillin Nausea Only     Cannot tolerate side effects    Doxycycline Nausea Only    Erythromycin Nausea Only     Other reaction(s): Nausea    Imitrex [sumatriptan succinate] Swelling and Palpitations     Other reaction(s): swelling  Other reaction(s): chest pains    Phenergan [promethazine] Other (See Comments)     Other reaction(s): Muscle pain  Muscle pain    Sulfa (sulfonamide antibiotics) Nausea And Vomiting       No current facility-administered medications on file prior  to encounter.     Current Outpatient Medications on File Prior to Encounter   Medication Sig    ondansetron (ZOFRAN-ODT) 8 MG TbDL Take 1 tablet (8 mg total) by mouth every 6 (six) hours as needed (nausea).    valsartan (DIOVAN) 160 MG tablet Take 1 tablet (160 mg total) by mouth once daily.    BORON ORAL Take by mouth.     Family History       Problem Relation (Age of Onset)    Blindness Maternal Grandmother, Paternal Grandmother    Breast cancer Maternal Grandmother, Paternal Grandmother    Cataracts Maternal Grandmother, Paternal Grandmother    Diabetes Paternal Grandmother    Emphysema Father    Heart attack Father, Sister, Maternal Grandmother, Paternal Grandmother    Heart failure Mother    Hypertension Mother    Lung cancer Father    Macular degeneration Mother    Stroke Mother          Tobacco Use    Smoking status: Never    Smokeless tobacco: Never   Substance and Sexual Activity    Alcohol use: No    Drug use: No    Sexual activity: Yes     Partners: Male     Review of Systems   Constitutional:  Negative for chills and fever.   HENT:  Negative for congestion and rhinorrhea.    Respiratory:  Negative for cough, shortness of breath and wheezing.    Cardiovascular:  Negative for chest pain, palpitations and leg swelling.   Gastrointestinal:  Negative for abdominal distention, abdominal pain, nausea and vomiting.   Endocrine: Negative for cold intolerance and heat intolerance.   Genitourinary:  Negative for dysuria and urgency.   Musculoskeletal:  Positive for arthralgias. Negative for neck stiffness.   Skin:  Negative for rash and wound.   Neurological:  Negative for dizziness and weakness.     Objective:     Vital Signs (Most Recent):  Temp: 98.8 °F (37.1 °C) (06/25/24 1516)  Pulse: 83 (06/25/24 1516)  Resp: 18 (06/25/24 1516)  BP: (!) 127/59 (06/25/24 1516)  SpO2: 96 % (06/25/24 1516) Vital Signs (24h Range):  Temp:  [97.2 °F (36.2 °C)-99.1 °F (37.3 °C)] 98.8 °F (37.1 °C)  Pulse:  [] 83  Resp:   [12-38] 18  SpO2:  [94 %-100 %] 96 %  BP: (120-220)/() 127/59     Weight: 73.9 kg (163 lb)  Body mass index is 28.87 kg/m².     Physical Exam  Constitutional:       General: She is not in acute distress.     Appearance: She is well-developed.   HENT:      Head: Normocephalic and atraumatic.   Eyes:      Pupils: Pupils are equal, round, and reactive to light.   Cardiovascular:      Rate and Rhythm: Normal rate and regular rhythm.      Heart sounds: No murmur heard.  Pulmonary:      Effort: Pulmonary effort is normal. No respiratory distress.      Breath sounds: Normal breath sounds. No wheezing or rales.   Abdominal:      General: Bowel sounds are normal. There is no distension.      Palpations: Abdomen is soft.      Tenderness: There is no abdominal tenderness.   Musculoskeletal:      Comments: Right knee wrapped in Ace bandage.  Distal pulses intact   Skin:     General: Skin is warm and dry.      Findings: No rash.   Neurological:      Mental Status: She is alert and oriented to person, place, and time.      Cranial Nerves: No cranial nerve deficit.   Psychiatric:         Behavior: Behavior normal.              CRANIAL NERVES     CN III, IV, VI   Pupils are equal, round, and reactive to light.       Significant Labs: All pertinent labs within the past 24 hours have been reviewed.    Significant Imaging: I have reviewed all pertinent imaging results/findings within the past 24 hours.  Assessment/Plan:     * Primary osteoarthritis of right knee  S/p right knee TKA with Dr. Orellana on 06/25/2024  Will follow postop surgical recommendations.  Encourage incentive spirometry use  Early ambulation as recommended by PT  As needed antiemetics, pain medication  DVT prophylaxis with ASA 81 mg BID  Monitor H/H      Hypertension  Chronic, controlled. Latest blood pressure and vitals reviewed-     Temp:  [97.2 °F (36.2 °C)-99.1 °F (37.3 °C)]   Pulse:  []   Resp:  [12-38]   BP: (120-220)/()   SpO2:  [94 %-100 %]  .   Home meds for hypertension were reviewed and noted below.   Hypertension Medications               valsartan (DIOVAN) 160 MG tablet Take 1 tablet (160 mg total) by mouth once daily.            While in the hospital, will manage blood pressure as follows; Continue home antihypertensive regimen    Will utilize p.r.n. blood pressure medication only if patient's blood pressure greater than 180/110 and she develops symptoms such as worsening chest pain or shortness of breath.      VTE Risk Mitigation (From admission, onward)           Ordered     enoxaparin injection 40 mg  Every 24 hours (non-standard times)         06/25/24 1455     IP VTE LOW RISK PATIENT  Once         06/25/24 1455     Place sequential compression device  Until discontinued         06/25/24 1455                               Pt prepared for surgery. Pt resting in bed, with family/friend at bedside. Family signed up for text messaging. Belongings kept by   IS teaching performed. Fall risk and Allergy armband placed.Limb alert band to left arm for CRPS Pt states no IV's to left arm  SCDs on.     Urszula Ch MD  Department of Hospital Medicine  [facility] - Med/Surg

## 2024-06-26 VITALS
DIASTOLIC BLOOD PRESSURE: 67 MMHG | SYSTOLIC BLOOD PRESSURE: 146 MMHG | RESPIRATION RATE: 14 BRPM | HEIGHT: 63 IN | TEMPERATURE: 98 F | BODY MASS INDEX: 28.67 KG/M2 | OXYGEN SATURATION: 95 % | WEIGHT: 161.81 LBS | HEART RATE: 80 BPM

## 2024-06-26 LAB
ANION GAP SERPL CALC-SCNC: 7 MMOL/L (ref 8–16)
BASOPHILS # BLD AUTO: 0.01 K/UL (ref 0–0.2)
BASOPHILS NFR BLD: 0.1 % (ref 0–1.9)
BUN SERPL-MCNC: 20 MG/DL (ref 8–23)
CALCIUM SERPL-MCNC: 8.6 MG/DL (ref 8.7–10.5)
CHLORIDE SERPL-SCNC: 111 MMOL/L (ref 95–110)
CO2 SERPL-SCNC: 22 MMOL/L (ref 23–29)
CREAT SERPL-MCNC: 0.9 MG/DL (ref 0.5–1.4)
DIFFERENTIAL METHOD BLD: ABNORMAL
EOSINOPHIL # BLD AUTO: 0 K/UL (ref 0–0.5)
EOSINOPHIL NFR BLD: 0.1 % (ref 0–8)
ERYTHROCYTE [DISTWIDTH] IN BLOOD BY AUTOMATED COUNT: 13.3 % (ref 11.5–14.5)
EST. GFR  (NO RACE VARIABLE): >60 ML/MIN/1.73 M^2
GLUCOSE SERPL-MCNC: 137 MG/DL (ref 70–110)
HCT VFR BLD AUTO: 35.7 % (ref 37–48.5)
HGB BLD-MCNC: 11.1 G/DL (ref 12–16)
IMM GRANULOCYTES # BLD AUTO: 0.09 K/UL (ref 0–0.04)
IMM GRANULOCYTES NFR BLD AUTO: 0.6 % (ref 0–0.5)
LYMPHOCYTES # BLD AUTO: 1.7 K/UL (ref 1–4.8)
LYMPHOCYTES NFR BLD: 10.4 % (ref 18–48)
MCH RBC QN AUTO: 28.5 PG (ref 27–31)
MCHC RBC AUTO-ENTMCNC: 31.1 G/DL (ref 32–36)
MCV RBC AUTO: 92 FL (ref 82–98)
MONOCYTES # BLD AUTO: 1.2 K/UL (ref 0.3–1)
MONOCYTES NFR BLD: 7.3 % (ref 4–15)
NEUTROPHILS # BLD AUTO: 13 K/UL (ref 1.8–7.7)
NEUTROPHILS NFR BLD: 81.5 % (ref 38–73)
NRBC BLD-RTO: 0 /100 WBC
PLATELET # BLD AUTO: 263 K/UL (ref 150–450)
PMV BLD AUTO: 10.4 FL (ref 9.2–12.9)
POTASSIUM SERPL-SCNC: 4.6 MMOL/L (ref 3.5–5.1)
RBC # BLD AUTO: 3.9 M/UL (ref 4–5.4)
SODIUM SERPL-SCNC: 140 MMOL/L (ref 136–145)
WBC # BLD AUTO: 15.9 K/UL (ref 3.9–12.7)

## 2024-06-26 PROCEDURE — 63600175 PHARM REV CODE 636 W HCPCS: Performed by: ORTHOPAEDIC SURGERY

## 2024-06-26 PROCEDURE — 94760 N-INVAS EAR/PLS OXIMETRY 1: CPT

## 2024-06-26 PROCEDURE — 25000003 PHARM REV CODE 250: Performed by: ORTHOPAEDIC SURGERY

## 2024-06-26 PROCEDURE — 97110 THERAPEUTIC EXERCISES: CPT | Mod: CQ

## 2024-06-26 PROCEDURE — 80048 BASIC METABOLIC PNL TOTAL CA: CPT | Performed by: ORTHOPAEDIC SURGERY

## 2024-06-26 PROCEDURE — 97165 OT EVAL LOW COMPLEX 30 MIN: CPT

## 2024-06-26 PROCEDURE — 85025 COMPLETE CBC W/AUTO DIFF WBC: CPT | Performed by: ORTHOPAEDIC SURGERY

## 2024-06-26 PROCEDURE — 97535 SELF CARE MNGMENT TRAINING: CPT

## 2024-06-26 PROCEDURE — 36415 COLL VENOUS BLD VENIPUNCTURE: CPT | Performed by: ORTHOPAEDIC SURGERY

## 2024-06-26 PROCEDURE — 97116 GAIT TRAINING THERAPY: CPT | Mod: CQ

## 2024-06-26 PROCEDURE — 94799 UNLISTED PULMONARY SVC/PX: CPT

## 2024-06-26 PROCEDURE — 25000003 PHARM REV CODE 250: Performed by: HOSPITALIST

## 2024-06-26 RX ORDER — ACETAMINOPHEN 325 MG/1
650 TABLET ORAL EVERY 6 HOURS PRN
Status: DISCONTINUED | OUTPATIENT
Start: 2024-06-26 | End: 2024-06-26 | Stop reason: HOSPADM

## 2024-06-26 RX ADMIN — FAMOTIDINE 20 MG: 20 TABLET, FILM COATED ORAL at 08:06

## 2024-06-26 RX ADMIN — DOCUSATE SODIUM 100 MG: 100 CAPSULE, LIQUID FILLED ORAL at 08:06

## 2024-06-26 RX ADMIN — ONDANSETRON 8 MG: 4 TABLET, ORALLY DISINTEGRATING ORAL at 12:06

## 2024-06-26 RX ADMIN — ENOXAPARIN SODIUM 40 MG: 40 INJECTION SUBCUTANEOUS at 07:06

## 2024-06-26 RX ADMIN — ASPIRIN 81 MG CHEWABLE TABLET 81 MG: 81 TABLET CHEWABLE at 08:06

## 2024-06-26 RX ADMIN — SODIUM CHLORIDE, POTASSIUM CHLORIDE, SODIUM LACTATE AND CALCIUM CHLORIDE: 600; 310; 30; 20 INJECTION, SOLUTION INTRAVENOUS at 01:06

## 2024-06-26 RX ADMIN — HYDROCODONE BITARTRATE AND ACETAMINOPHEN 1 TABLET: 5; 325 TABLET ORAL at 07:06

## 2024-06-26 RX ADMIN — CLINDAMYCIN PHOSPHATE 900 MG: 900 INJECTION, SOLUTION INTRAVENOUS at 02:06

## 2024-06-26 RX ADMIN — ONDANSETRON 8 MG: 4 TABLET, ORALLY DISINTEGRATING ORAL at 07:06

## 2024-06-26 RX ADMIN — VALSARTAN 160 MG: 80 TABLET, FILM COATED ORAL at 08:06

## 2024-06-26 RX ADMIN — ACETAMINOPHEN 650 MG: 325 TABLET ORAL at 11:06

## 2024-06-26 RX ADMIN — HYDROCODONE BITARTRATE AND ACETAMINOPHEN 1 TABLET: 5; 325 TABLET ORAL at 12:06

## 2024-06-26 NOTE — DISCHARGE SUMMARY
[facility]  Discharge Summary      Patient Name: Abi Duran  MRN: [id number]  ELI: [id number]  Patient Class: OP- Outpatient Recovery  Admission Date: 6/25/2024  Hospital Length of Stay: 0 days  Discharge Date and Time: 6/26/2024 12:28 PM  Attending Physician: No att. providers found   Discharging Provider: Urszula Ch MD  Primary Care Provider: Andrew Perales Jr., MD    Primary Care Team: Networked reference to record PCT     HPI:   Patient with hypertension and GERD admitted after right total knee arthroplasty with Dr. Orellana.  She reports pain is controlled at this time.  She denies shortness or breath or chest pain.  She denies nausea, vomiting, abdominal pain.  She does note that she has nausea with narcotics in the past.  Patient will be monitored by the medicine service postoperatively.    Procedure(s) (LRB):  ROBOTIC ARTHROPLASTY, KNEE, TOTAL (Right)      Hospital Course:   Patient was monitored by the hospital medicine service after right total knee arthroplasty with Dr. Orellana.  Postoperative course was unremarkable.  Hemoglobin was stable.  Pain was decently controlled.  She worked with PT and OT who recommended home health which was arranged.  She was discharged home with pain medication as well as Xarelto per orthopedist.  She was cleared for discharge by orthopedist and will follow up with him in clinic.     Goals of Care Treatment Preferences:  Code Status: Full Code      Consults:   Consults (From admission, onward)          Status Ordering Provider     Inpatient consult to Hospitalist  Once        Provider:  Urszula Ch MD Acknowledged GUEVARA, BENJAMIN GRAY            No new Assessment & Plan notes have been filed under this hospital service since the last note was generated.  Service: Hospital Medicine    Final Active Diagnoses:    Diagnosis Date Noted POA    PRINCIPAL PROBLEM:  Primary osteoarthritis of right knee [M17.11] 06/25/2024  Yes    Hypertension [I10] 06/25/2024 Yes      Problems Resolved During this Admission:       Discharged Condition: good    Disposition: Home or Self Care    Follow Up:   Follow-up Information       [facility]. Call in 1 day(s).    Specialties: Physical Therapy, Home Health Services  Why: As needed  Contact information:  [address] [place] [address] [address]  [address]  [place] [address]  [phone]               Rod Orellana MD. Go on 7/11/2024.    Specialties: Sports Medicine, Orthopedic Surgery  Why: post-op at 2:15  Contact information:  [address] [place] [address] [address]  [place] [address]  [phone]                           Patient Instructions:      Ambulatory referral/consult to Home Health   Standing Status: Future   Referral Priority: Routine Referral Type: Home Health Care   Referral Reason: Specialty Services Required   Requested Specialty: Home Health Services   Number of Visits Requested: 1     Notify your health care provider if you experience any of the following:  temperature >100.4     Notify your health care provider if you experience any of the following:  persistent nausea and vomiting or diarrhea     Notify your health care provider if you experience any of the following:  severe uncontrolled pain     Notify your health care provider if you experience any of the following:  persistent dizziness, light-headedness, or visual disturbances     Notify your health care provider if you experience any of the following:  increased confusion or weakness     Activity as tolerated       Significant Diagnostic Studies: Labs: CBC   Recent Labs   Lab 06/26/24  0452   WBC 15.90*   HGB 11.1*   HCT 35.7*          Pending Diagnostic Studies:       None           Medications:  Reconciled Home Medications:      Medication List        CONTINUE taking these medications      acetaminophen 500 MG tablet  Commonly known as: TYLENOL  Take 2 tablets (1,000 mg total) by mouth every 8 (eight) hours as  needed for Pain.     BORON ORAL  Take by mouth.     cyclobenzaprine 10 MG tablet  Commonly known as: FLEXERIL  Take 1 tablet (10 mg total) by mouth 3 (three) times daily as needed for Muscle spasms.     HYDROcodone-acetaminophen 5-325 mg per tablet  Commonly known as: NORCO  Take 1 tablet by mouth every 6 (six) hours as needed for Pain.     ibuprofen 600 MG tablet  Commonly known as: ADVIL,MOTRIN  Take 1 tablet (600 mg total) by mouth 3 (three) times daily as needed for Pain.     ondansetron 4 MG tablet  Commonly known as: ZOFRAN  Take 1 tablet (4 mg total) by mouth every 6 (six) hours as needed for Nausea.     ondansetron 8 MG Tbdl  Commonly known as: ZOFRAN-ODT  Take 1 tablet (8 mg total) by mouth every 6 (six) hours as needed (nausea).     UNABLE TO FIND  Take 1 capsule by mouth Daily. medication name:NMN supplement     valsartan 160 MG tablet  Commonly known as: DIOVAN  Take 1 tablet (160 mg total) by mouth once daily.     XARELTO 10 mg Tab  Generic drug: rivaroxaban  Take 1 tablet (10 mg total) by mouth daily with dinner or evening meal.              Indwelling Lines/Drains at time of discharge:   Lines/Drains/Airways       None                   Time spent on the discharge of patient: 35 minutes         Urszula Ch MD  Department of Hospital Medicine  [facility]/Surg

## 2024-06-26 NOTE — PLAN OF CARE
Per joint camp note, referral was sent to [place] [facility]. Clinicals sent to update on DC.     Post-op appt added to AVS.       06/26/24 5622   Post-Acute Status   Post-Acute Authorization Home Health   Home Health Status Referrals Sent   Hospital Resources/Appts/Education Provided Appointments scheduled and added to AVS

## 2024-06-26 NOTE — CARE UPDATE
06/25/24 1948   Patient Assessment/Suction   Level of Consciousness (AVPU) alert   Respiratory Effort Unlabored   Expansion/Accessory Muscles/Retractions no retractions;no use of accessory muscles   PRE-TX-O2   Device (Oxygen Therapy) room air   SpO2 (!) 94 %   Pulse Oximetry Type Intermittent   $ Pulse Oximetry - Multiple Charge Pulse Oximetry - Multiple   Pulse 92   Resp 18   Incentive Spirometer   $ Incentive Spirometer Charges done with encouragement   Administration (IS) proper technique demonstrated   Number of Repetitions (IS) 10   Level Incentive Spirometer (mL) 2000   Patient Tolerance (IS) no adverse signs/symptoms present;good

## 2024-06-26 NOTE — PLAN OF CARE
[facility] - Med/Surg  Initial Discharge Assessment       Primary Care Provider: Andrew Perales Jr., MD    Admission Diagnosis: Primary osteoarthritis of right knee [M17.11]  Pre-op testing [Z01.818]    Admission Date: 6/25/2024  Expected Discharge Date: 6/26/2024    Transition of Care Barriers: None    Payor: MEDICARE / Plan: MEDICARE PART A & B / Product Type: Government /     Extended Emergency Contact Information  Primary Emergency Contact: ReneeSae  Address: [address] [facility]           [place] [address] [facility]  Home Phone: [phone]  Mobile Phone: [phone]  Relation: Spouse    Discharge Plan A: Home Health  Discharge Plan B: Home with family      [id number] - [place], [place] - [address]  [address]  [place] [address]  Phone: [phone] Fax: [phone]    DC assessment completed at bedside with pt, information on facesheet verified. Lives at listed address with spouse who will drive her home. PCP is Dr. Perales. Pharm [facility]. Denies coumadin, HH, HD. DME- RW. Will DC with HH. Insurance verified. Independent at baseline. Drives self. Denies POA. Denies recent admission. Planning to DC home when clear.     Initial Assessment (most recent)       Adult Discharge Assessment - 06/26/24 1043          Discharge Assessment    Assessment Type Discharge Planning Assessment     Confirmed/corrected address, phone number and insurance Yes     Confirmed Demographics Correct on Facesheet     Source of Information patient     Communicated JUAN M with patient/caregiver Yes     People in Home spouse     Facility Arrived From: home     Do you expect to return to your current living situation? Yes     Do you have help at home or someone to help you manage your care at home? Yes     Who are your caregiver(s) and their phone number(s)? spouse     Prior to hospitilization cognitive status: Alert/Oriented     Current cognitive status: Alert/Oriented     Equipment  Currently Used at Home walker, rolling     Readmission within 30 days? No     Patient currently being followed by outpatient case management? No     Do you currently have service(s) that help you manage your care at home? No     Do you take prescription medications? Yes     Do you have prescription coverage? Yes     Coverage BCBS     Do you have any problems affording any of your prescribed medications? No     Is the patient taking medications as prescribed? yes     Who is going to help you get home at discharge? spouse     How do you get to doctors appointments? car, drives self     Are you on dialysis? No     Do you take coumadin? No     Discharge Plan A Home Health     Discharge Plan B Home with family     DME Needed Upon Discharge  none     Discharge Plan discussed with: Patient     Transition of Care Barriers None

## 2024-06-26 NOTE — PLAN OF CARE
Goals to be met by: 7/24/24     Patient will increase functional independence with ADLs by performing:    UE Dressing with Modified [place].  LE Dressing with Modified [place].  Grooming while standing at sink with Modified [place].  Toileting from raised toilet with Modified [place] for hygiene and clothing management.   Bathing from  shower chair/bench with Modified [place].  Toilet transfer to raised toilet with Modified [place].  Increased strength and functional activity tolerance for ADL's/IADL's

## 2024-06-26 NOTE — CARE UPDATE
06/26/24 0855   Patient Assessment/Suction   Level of Consciousness (AVPU) alert   Respiratory Effort Unlabored   Expansion/Accessory Muscles/Retractions no use of accessory muscles;no retractions   Rhythm/Pattern, Respiratory pattern regular;depth regular   PRE-TX-O2   Device (Oxygen Therapy) room air   SpO2 99 %   Pulse Oximetry Type Continuous   $ Pulse Oximetry - Single Charge Pulse Oximetry - Single   Incentive Spirometer   $ Incentive Spirometer Charges done with encouragement   Administration (IS) proper technique demonstrated;self-administered   Number of Repetitions (IS) 10   Level Incentive Spirometer (mL) 2500   Patient Tolerance (IS) good;no adverse signs/symptoms present

## 2024-06-26 NOTE — HOSPITAL COURSE
Patient was monitored by the hospital medicine service after right total knee arthroplasty with Dr. Orellana.  Postoperative course was unremarkable.  Hemoglobin was stable.  Pain was decently controlled.  She worked with PT and OT who recommended home health which was arranged.  She was discharged home with pain medication as well as Xarelto per orthopedist.  She was cleared for discharge by orthopedist and will follow up with him in clinic.

## 2024-06-26 NOTE — NURSING
Discharge instructions given to patient, verbalized understanding. Peripheral IV removed. Medications delivered to patient. Elastic Bandage removed, surgical dressing intact. Patient requested elastic bandage be reapplied. Left floor via wheelchair, transportation provided by spouse.

## 2024-06-26 NOTE — PLAN OF CARE
Plan of care reviewed with patient. Verbalized understanding. IV intact and patent with fluids infusing. Purewick in place. No complaint of nausea noted. Pain managed with prn medications. Safety maintained. Call light in reach and instructed to call for assistance. Will continue to monitor.

## 2024-06-26 NOTE — PT/OT/SLP EVAL
Occupational Therapy   Evaluation    Name: Abi Duran  MRN: [id number]  Admitting Diagnosis: Primary osteoarthritis of right knee  Recent Surgery: Procedure(s) (LRB):  ROBOTIC ARTHROPLASTY, KNEE, TOTAL (Right) 1 Day Post-Op    Recommendations:     Discharge Recommendations: Low Intensity Therapy  Discharge Equipment Recommendations:  other (see comments) (Pt declined AE for lower body ADL's)  Barriers to discharge:       Assessment:     Abi Duran is a 68 y.o. female with a medical diagnosis of Primary osteoarthritis of right knee.  She presents with the following performance deficits affecting function: weakness, impaired endurance, impaired self care skills, impaired functional mobility, gait instability, impaired balance, decreased lower extremity function, pain, decreased ROM, orthopedic precautions.  Pt was up in chair and agreeable to OT. Recently walked with PT and medicated for pain.    Rehab Prognosis: Good; patient would benefit from acute skilled OT services to address these deficits and reach maximum level of function.       Plan:     Patient to be seen 5 x/week to address the above listed problems via self-care/home management, therapeutic activities, therapeutic exercises  Plan of Care Expires: 07/24/24  Plan of Care Reviewed with: patient    Subjective     Chief Complaint: Pain  Patient/Family Comments/goals: To get better    Occupational Profile:  Living Environment: Pt lives with spouse in 1 story home with 1 DELIA. Pt has a walk in shower with grab bars/built in seat. Pt has a booster with arm rails on toilet in prep for surgery.  Previous level of function: Independent but difficulty with ADL's/mobility  Roles and Routines: Spouse  Equipment Used at Home: other (see comments) (Pt has a raised toilet with rails and a walk in shower with grab bars/built in seat)  Assistance upon Discharge: Spouse    Pain/Comfort:  Pain Rating 1: 10/10 (Recently medicated. Recently walked with  PT)    Patients cultural, spiritual, Church conflicts given the current situation:      Objective:     Communicated with: Nurse Rubio prior to session.  Patient found up in chair with chair check, peripheral IV, PureWick upon OT entry to room.    General Precautions: Standard, fall  Orthopedic Precautions: RLE weight bearing as tolerated  Braces: N/A  Respiratory Status: Room air    Occupational Performance:        Activities of Daily Living:  Feeding:  independence    Grooming: stand by assistance set up in sitting  Bathing: moderate assistance    Upper Body Dressing: stand by assistance set up in sitting  Lower Body Dressing: moderate assistance    Toileting: Purewick in place/declined toileting    Cognitive/Visual Perceptual:  Pt alert and oriented    Physical Exam:  Upper Extremity Strength:    -       Right Upper Extremity: WFL  -       Left Upper Extremity: WFL    AMPAC 6 Click ADL:  AMPAC Total Score: 19    Treatment & Education:  OT provided education in role of OT. Patient verbalized understanding and participated in OT.  OT provided instruction in home safety with ADL/IADL including review of home set up and DME/AE. Patient verbalized understanding. Further training indicated.  OT provided education in use of AE for lower body ADL's. Pt verbalized understanding but declined AE.  OT provided education in calling for assist. Patient verbalized understanding.        Patient left up in chair with all lines intact, call button in reach, and chair alarm on    GOALS:   Multidisciplinary Problems       Occupational Therapy Goals          Problem: Occupational Therapy    Goal Priority Disciplines Outcome Interventions   Occupational Therapy Goal     OT, PT/OT     Description: Goals to be met by: 7/24/24     Patient will increase functional independence with ADLs by performing:    UE Dressing with Modified [place].  LE Dressing with Modified [place].  Grooming while standing at sink with Modified  [place].  Toileting from raised toilet with Modified [place] for hygiene and clothing management.   Bathing from  shower chair/bench with Modified [place].  Toilet transfer to raised toilet with Modified [place].  Increased strength and functional activity tolerance for ADL's/IADL's                         History:     Past Medical History:   Diagnosis Date    Allergy     Arthritis     Coronary artery disease     GERD (gastroesophageal reflux disease)     Hypertension     Ulcer          Past Surgical History:   Procedure Laterality Date    CARDIAC SURGERY      angiogram    CHOLECYSTECTOMY      EYE SURGERY      lasik    HYSTERECTOMY      KNEE SURGERY      scope    ROBOTIC ARTHROPLASTY, KNEE Right 6/25/2024    Procedure: ROBOTIC ARTHROPLASTY, KNEE, TOTAL;  Surgeon: Rod Orellana MD;  Location[facility];  Service: Orthopedics;  Laterality: Right;  NO SPINAL !!// cruzito    TONSILLECTOMY, ADENOIDECTOMY, BILATERAL MYRINGOTOMY AND TUBES      TUBAL LIGATION         Time Tracking:     OT Date of Treatment: 06/26/24  OT Start Time: 0940  OT Stop Time: 1015  OT Total Time (min): 35 min    Billable Minutes:Evaluation 8  Self Care/Home Management 27    6/26/2024

## 2024-06-26 NOTE — PLAN OF CARE
Pt clear for DC from CM standpoint. Discharging home with HH.     SOC with [facility] 6/27.       06/26/24 1046   Final Note   Assessment Type Final Discharge Note   Anticipated Discharge Disposition Home-Health   Hospital Resources/Appts/Education Provided Appointments scheduled and added to AVS   Post-Acute Status   Post-Acute Authorization Home Health   Home Health Status Set-up Complete/Auth obtained

## 2024-06-27 ENCOUNTER — PATIENT OUTREACH (OUTPATIENT)
Dept: ADMINISTRATIVE | Facility: HOSPITAL | Age: 69
End: 2024-06-27
Payer: MEDICARE

## 2024-06-27 ENCOUNTER — PATIENT MESSAGE (OUTPATIENT)
Dept: ADMINISTRATIVE | Facility: HOSPITAL | Age: 69
End: 2024-06-27
Payer: MEDICARE

## 2024-06-27 PROCEDURE — G0180 MD CERTIFICATION HHA PATIENT: HCPCS | Mod: ,,, | Performed by: ORTHOPAEDIC SURGERY

## 2024-06-27 RX ORDER — HYDROCODONE BITARTRATE AND ACETAMINOPHEN 10; 325 MG/1; MG/1
1 TABLET ORAL EVERY 6 HOURS PRN
Qty: 28 TABLET | Refills: 0 | Status: SHIPPED | OUTPATIENT
Start: 2024-06-27

## 2024-06-27 NOTE — TELEPHONE ENCOUNTER
----- Message from Kedar Holt MA sent at 6/27/2024  1:32 PM CDT -----  Contact: patient  Patient had TKA on right knee this past Tuesday 6/25/24 and stated the pain meds she was prescribed are not helping with the pain.    Call back number is [phone]      [facility]/pharmacy #[id number] - [place] - [address]  [address]  [place] [address]  Phone: [phone] Fax: [phone]

## 2024-06-27 NOTE — PROGRESS NOTES
Uncontrolled BP REPORT  BP Readings from Last 3 Encounters:   06/26/24 (!) 146/67   05/20/24 (!) 180/110   02/21/24 (!) 229/113       Non-compliant report chart audits for HYPERTENSION MANAGEMENT   NEED REMOTE HOME BP READING DOCUMENTED   OR  BP FOLLOW UP WITH NURSE VISIT OR CARE TEAM MEMBER

## 2024-06-28 ENCOUNTER — PATIENT OUTREACH (OUTPATIENT)
Dept: ADMINISTRATIVE | Facility: HOSPITAL | Age: 69
End: 2024-06-28
Payer: MEDICARE

## 2024-06-28 NOTE — PROGRESS NOTES
Population Health Chart Review & Patient Outreach Details      Additional [facility] Health Notes:      Unable to leave VM  PORTAL/ LETTER MSG SENT         Updates Requested / Reviewed:      Health Maintenance Topics Overdue:      VBHM Score: 4     Osteoporosis Screening  Mammogram  Uncontrolled BP  Hemoglobin A1c    Pneumonia Vaccine  Tetanus Vaccine  Shingles/Zoster Vaccine  RSV Vaccine                  Health Maintenance Topic(s) Outreach Outcomes & Actions Taken:

## 2024-07-02 DIAGNOSIS — Z96.651 S/P TOTAL KNEE REPLACEMENT, RIGHT: Primary | ICD-10-CM

## 2024-07-03 RX ORDER — HYDROCODONE BITARTRATE AND ACETAMINOPHEN 10; 325 MG/1; MG/1
1 TABLET ORAL EVERY 6 HOURS PRN
Qty: 28 TABLET | Refills: 0 | Status: SHIPPED | OUTPATIENT
Start: 2024-07-03

## 2024-07-04 DIAGNOSIS — R11.0 NAUSEA: ICD-10-CM

## 2024-07-05 ENCOUNTER — PATIENT MESSAGE (OUTPATIENT)
Dept: ORTHOPEDICS | Facility: CLINIC | Age: 69
End: 2024-07-05
Payer: MEDICARE

## 2024-07-05 DIAGNOSIS — R11.0 NAUSEA: ICD-10-CM

## 2024-07-08 VITALS — DIASTOLIC BLOOD PRESSURE: 73 MMHG | SYSTOLIC BLOOD PRESSURE: 128 MMHG

## 2024-07-08 DIAGNOSIS — Z96.651 S/P TOTAL KNEE REPLACEMENT, RIGHT: Primary | ICD-10-CM

## 2024-07-08 RX ORDER — ONDANSETRON 8 MG/1
TABLET, ORALLY DISINTEGRATING ORAL
Qty: 30 TABLET | Refills: 3 | Status: SHIPPED | OUTPATIENT
Start: 2024-07-08

## 2024-07-08 RX ORDER — ONDANSETRON 8 MG/1
8 TABLET, ORALLY DISINTEGRATING ORAL EVERY 6 HOURS PRN
Qty: 30 TABLET | Refills: 0 | Status: SHIPPED | OUTPATIENT
Start: 2024-07-08

## 2024-07-08 NOTE — TELEPHONE ENCOUNTER
Please Review Portal Message.      Abi Duran 68 y.o. female     Review of patient's allergies indicates:   Allergen Reactions    Oxycodone Swelling     Other reaction(s):swelling of face  Other reaction(s): Flushing (skin)    Amoxicillin Nausea Only     Cannot tolerate side effects    Doxycycline Nausea Only    Erythromycin Nausea Only     Other reaction(s): Nausea    Imitrex [sumatriptan succinate] Swelling and Palpitations     Other reaction(s): swelling  Other reaction(s): chest pains    Phenergan [promethazine] Other (See Comments)     Other reaction(s): Muscle pain  Muscle pain    Sulfa (sulfonamide antibiotics) Nausea And Vomiting         Patient was instructed to you sit upright with both feet flat on the floor for five minutes without any distractions/interruptions (no television, no talking, etc.). After five minutes of sitting, measure their blood pressure. If their blood pressure was over 140/90.  They were asked to wait 15 minutes and retake in the other arm.       REMOTE HOME BLOOD PRESSURE READING DOCUMENTED WITH TWO MOST RECENT IN CLINIC BLOOD PRESSURE READINGS.        BP Readings from Last 3 Encounters:   07/08/24 128/73   06/26/24 (!) 146/67   05/20/24 (!) 180/110

## 2024-07-11 ENCOUNTER — OFFICE VISIT (OUTPATIENT)
Dept: ORTHOPEDICS | Facility: CLINIC | Age: 69
End: 2024-07-11
Payer: MEDICARE

## 2024-07-11 ENCOUNTER — HOSPITAL ENCOUNTER (OUTPATIENT)
Dept: RADIOLOGY | Facility: HOSPITAL | Age: 69
Discharge: HOME | End: 2024-07-11
Attending: ORTHOPAEDIC SURGERY
Payer: MEDICARE

## 2024-07-11 VITALS — BODY MASS INDEX: 28.67 KG/M2 | WEIGHT: 161.81 LBS | HEIGHT: 63 IN

## 2024-07-11 DIAGNOSIS — Z96.651 S/P TOTAL KNEE REPLACEMENT, RIGHT: Primary | ICD-10-CM

## 2024-07-11 DIAGNOSIS — Z96.651 S/P TOTAL KNEE REPLACEMENT, RIGHT: ICD-10-CM

## 2024-07-11 PROCEDURE — 99999 PR PBB SHADOW E&M-EST. PATIENT-LVL II: CPT | Mod: PBBFAC,,, | Performed by: ORTHOPAEDIC SURGERY

## 2024-07-11 PROCEDURE — 73560 X-RAY EXAM OF KNEE 1 OR 2: CPT | Mod: 26,RT,, | Performed by: RADIOLOGY

## 2024-07-11 PROCEDURE — 99212 OFFICE O/P EST SF 10 MIN: CPT | Mod: PBBFAC,25,PO | Performed by: ORTHOPAEDIC SURGERY

## 2024-07-11 PROCEDURE — 99024 POSTOP FOLLOW-UP VISIT: CPT | Mod: POP,,, | Performed by: ORTHOPAEDIC SURGERY

## 2024-07-11 PROCEDURE — 73560 X-RAY EXAM OF KNEE 1 OR 2: CPT | Mod: TC,PO,RT

## 2024-07-11 NOTE — PROGRESS NOTES
Past Medical History:   Diagnosis Date    Allergy     Arthritis     Coronary artery disease     GERD (gastroesophageal reflux disease)     Hypertension     Ulcer        Past Surgical History:   Procedure Laterality Date    CARDIAC SURGERY      angiogram    CHOLECYSTECTOMY      EYE SURGERY      lasik    HYSTERECTOMY      KNEE SURGERY      scope    ROBOTIC ARTHROPLASTY, KNEE Right 6/25/2024    Procedure: ROBOTIC ARTHROPLASTY, KNEE, TOTAL;  Surgeon: Rod Orellana MD;  Location: [facility];  Service: Orthopedics;  Laterality: Right;  NO SPINAL !!// cruzito    TONSILLECTOMY, ADENOIDECTOMY, BILATERAL MYRINGOTOMY AND TUBES      TUBAL LIGATION         Current Outpatient Medications   Medication Sig    acetaminophen (TYLENOL) 500 MG tablet Take 2 tablets (1,000 mg total) by mouth every 8 (eight) hours as needed for Pain.    BORON ORAL Take by mouth.    HYDROcodone-acetaminophen (NORCO)  mg per tablet Take 1 tablet by mouth every 6 (six) hours as needed for Pain.    HYDROcodone-acetaminophen (NORCO) 5-325 mg per tablet Take 1 tablet by mouth every 6 (six) hours as needed for Pain.    ondansetron (ZOFRAN) 4 MG tablet Take 1 tablet (4 mg total) by mouth every 6 (six) hours as needed for Nausea.    ondansetron (ZOFRAN-ODT) 8 MG TbDL TAKE 1 TABLET (8 MG TOTAL) BY MOUTH EVERY 6 HOURS AS NEEDED FOR NAUSEA    ondansetron (ZOFRAN-ODT) 8 MG TbDL Take 1 tablet (8 mg total) by mouth every 6 (six) hours as needed (nausea).    rivaroxaban (XARELTO) 10 mg Tab Take 1 tablet (10 mg total) by mouth daily with dinner or evening meal.    UNABLE TO FIND Take 1 capsule by mouth Daily. medication name:NMN supplement     No current facility-administered medications for this visit.       Review of patient's allergies indicates:   Allergen Reactions    Oxycodone Swelling     Other reaction(s):swelling of face  Other reaction(s): Flushing (skin)    Amoxicillin Nausea Only     Cannot tolerate side effects    Doxycycline Nausea Only     Erythromycin Nausea Only     Other reaction(s): Nausea    Imitrex [sumatriptan succinate] Swelling and Palpitations     Other reaction(s): swelling  Other reaction(s): chest pains    Phenergan [promethazine] Other (See Comments)     Other reaction(s): Muscle pain  Muscle pain    Sulfa (sulfonamide antibiotics) Nausea And Vomiting       Family History   Problem Relation Name Age of Onset    Heart failure Mother      Stroke Mother      Macular degeneration Mother      Hypertension Mother      Heart attack Father      Emphysema Father      Lung cancer Father      Heart attack Sister      Breast cancer Maternal Grandmother      Heart attack Maternal Grandmother      Blindness Maternal Grandmother      Cataracts Maternal Grandmother      Breast cancer Paternal Grandmother      Heart attack Paternal Grandmother      Blindness Paternal Grandmother      Cataracts Paternal Grandmother      Diabetes Paternal Grandmother      Amblyopia Neg Hx      Cancer Neg Hx      Glaucoma Neg Hx      Retinal detachment Neg Hx      Strabismus Neg Hx      Thyroid disease Neg Hx         Social History     Socioeconomic History    Marital status:    Tobacco Use    Smoking status: Never    Smokeless tobacco: Never   Substance and Sexual Activity    Alcohol use: No    Drug use: No    Sexual activity: Yes     Partners: Male     Social Determinants of Health     Financial Resource Strain: Low Risk  (5/17/2024)    Overall Financial Resource Strain (CARDIA)     Difficulty of Paying Living Expenses: Not hard at all   Food Insecurity: No Food Insecurity (5/17/2024)    Hunger Vital Sign     Worried About Running Out of Food in the Last Year: Never true     Ran Out of Food in the Last Year: Never true   Transportation Needs: No Transportation Needs (9/19/2022)    PRAPARE - Transportation     Lack of Transportation (Medical): No     Lack of Transportation (Non-Medical): No   Physical Activity: Inactive (5/17/2024)    Exercise Vital Sign     Days of  Exercise per Week: 0 days     Minutes of Exercise per Session: 0 min   Stress: No Stress Concern Present (5/17/2024)    [place] of Occupational Health - Occupational Stress Questionnaire     Feeling of Stress : Not at all   Housing Stability: Low Risk  (9/19/2022)    Housing Stability Vital Sign     Unable to Pay for Housing in the Last Year: No     Number of Places Lived in the Last Year: 2     Unstable Housing in the Last Year: No       Chief Complaint: No chief complaint on file.      Date of surgery:  June 25, 2024    History of present illness:  68-year-old female underwent right robotic assisted total knee arthroplasty.  She is doing okay.  She had a bad day with the transportation over here and standing up to do x-rays.  Pain is a 10/10.  States it has been getting better every day.      Review of Systems:    Musculoskeletal:  See HPI        Physical Examination:    Vital Signs:  There were no vitals filed for this visit.    There is no height or weight on file to calculate BMI.    This a well-developed, well nourished patient in no acute distress.  They are alert and oriented and cooperative to examination.  Pt. comes in in a wheelchair    Examination of the right knee shows well-healing surgical incision.  No erythema drainage.  Patient has a fair amount of swelling and bruising in the knee and leg.  Range of motion is about 5° to 90°.  No calf pain.    X-rays:  Two views of the right knee are ordered and review which show well-aligned right total knee arthroplasty without complication     Assessment::  Status post right robotic assisted total knee arthroplasty    Plan:  I reviewed the findings with her today.  We will extend the home health physical therapy due to the pain that she has getting in and out of cars.  Refilled her Zofran.  Okay to get the incision wet.  Follow up in 4 weeks.  No x-rays needed.    This note was created using M Modal voice recognition software that occasionally  misinterpreted phrases or words.

## 2024-07-12 ENCOUNTER — PATIENT MESSAGE (OUTPATIENT)
Dept: ORTHOPEDICS | Facility: CLINIC | Age: 69
End: 2024-07-12
Payer: MEDICARE

## 2024-07-12 DIAGNOSIS — Z96.651 S/P TOTAL KNEE REPLACEMENT, RIGHT: Primary | ICD-10-CM

## 2024-07-15 RX ORDER — HYDROCODONE BITARTRATE AND ACETAMINOPHEN 5; 325 MG/1; MG/1
1 TABLET ORAL EVERY 6 HOURS PRN
Qty: 28 TABLET | Refills: 0 | Status: SHIPPED | OUTPATIENT
Start: 2024-07-15

## 2024-07-22 ENCOUNTER — PATIENT MESSAGE (OUTPATIENT)
Dept: ORTHOPEDICS | Facility: CLINIC | Age: 69
End: 2024-07-22
Payer: MEDICARE

## 2024-07-22 DIAGNOSIS — Z96.651 S/P TOTAL KNEE REPLACEMENT, RIGHT: Primary | ICD-10-CM

## 2024-07-23 ENCOUNTER — EXTERNAL HOME HEALTH (OUTPATIENT)
Dept: HOME HEALTH SERVICES | Facility: HOSPITAL | Age: 69
End: 2024-07-23
Payer: MEDICARE

## 2024-07-23 ENCOUNTER — PATIENT MESSAGE (OUTPATIENT)
Dept: ORTHOPEDICS | Facility: CLINIC | Age: 69
End: 2024-07-23
Payer: MEDICARE

## 2024-07-23 DIAGNOSIS — Z96.651 S/P TOTAL KNEE REPLACEMENT, RIGHT: ICD-10-CM

## 2024-07-23 RX ORDER — HYDROCODONE BITARTRATE AND ACETAMINOPHEN 10; 325 MG/1; MG/1
1 TABLET ORAL EVERY 6 HOURS PRN
Qty: 28 TABLET | Refills: 0 | Status: SHIPPED | OUTPATIENT
Start: 2024-07-23

## 2024-07-25 ENCOUNTER — PATIENT MESSAGE (OUTPATIENT)
Dept: ORTHOPEDICS | Facility: CLINIC | Age: 69
End: 2024-07-25
Payer: MEDICARE

## 2024-07-25 ENCOUNTER — DOCUMENT SCAN (OUTPATIENT)
Dept: HOME HEALTH SERVICES | Facility: HOSPITAL | Age: 69
End: 2024-07-25
Payer: MEDICARE

## 2024-08-03 ENCOUNTER — PATIENT MESSAGE (OUTPATIENT)
Dept: ORTHOPEDICS | Facility: CLINIC | Age: 69
End: 2024-08-03
Payer: MEDICARE

## 2024-08-03 DIAGNOSIS — Z96.651 S/P TOTAL KNEE REPLACEMENT, RIGHT: ICD-10-CM

## 2024-08-05 RX ORDER — HYDROCODONE BITARTRATE AND ACETAMINOPHEN 10; 325 MG/1; MG/1
1 TABLET ORAL EVERY 6 HOURS PRN
Qty: 28 TABLET | Refills: 0 | Status: SHIPPED | OUTPATIENT
Start: 2024-08-05

## 2024-08-08 ENCOUNTER — OFFICE VISIT (OUTPATIENT)
Dept: ORTHOPEDICS | Facility: CLINIC | Age: 69
End: 2024-08-08
Payer: MEDICARE

## 2024-08-08 VITALS — RESPIRATION RATE: 18 BRPM | WEIGHT: 161.81 LBS | HEIGHT: 63 IN | BODY MASS INDEX: 28.67 KG/M2

## 2024-08-08 DIAGNOSIS — Z96.651 S/P TOTAL KNEE REPLACEMENT, RIGHT: Primary | ICD-10-CM

## 2024-08-08 DIAGNOSIS — M25.661 KNEE STIFFNESS, RIGHT: ICD-10-CM

## 2024-08-08 PROCEDURE — 99212 OFFICE O/P EST SF 10 MIN: CPT | Mod: PBBFAC,PO | Performed by: ORTHOPAEDIC SURGERY

## 2024-08-08 PROCEDURE — 99999 PR PBB SHADOW E&M-EST. PATIENT-LVL II: CPT | Mod: PBBFAC,,, | Performed by: ORTHOPAEDIC SURGERY

## 2024-08-13 ENCOUNTER — PATIENT MESSAGE (OUTPATIENT)
Dept: ORTHOPEDICS | Facility: CLINIC | Age: 69
End: 2024-08-13
Payer: MEDICARE

## 2024-08-13 DIAGNOSIS — Z96.651 S/P TOTAL KNEE REPLACEMENT, RIGHT: ICD-10-CM

## 2024-08-13 RX ORDER — HYDROCODONE BITARTRATE AND ACETAMINOPHEN 10; 325 MG/1; MG/1
1 TABLET ORAL EVERY 6 HOURS PRN
Qty: 28 TABLET | Refills: 0 | Status: SHIPPED | OUTPATIENT
Start: 2024-08-13

## 2024-08-20 ENCOUNTER — PATIENT MESSAGE (OUTPATIENT)
Dept: ORTHOPEDICS | Facility: CLINIC | Age: 69
End: 2024-08-20
Payer: MEDICARE

## 2024-08-20 DIAGNOSIS — R11.0 NAUSEA: ICD-10-CM

## 2024-08-20 RX ORDER — ONDANSETRON 8 MG/1
8 TABLET, ORALLY DISINTEGRATING ORAL EVERY 6 HOURS PRN
Qty: 30 TABLET | Refills: 0 | Status: SHIPPED | OUTPATIENT
Start: 2024-08-20

## 2024-08-22 ENCOUNTER — PATIENT MESSAGE (OUTPATIENT)
Dept: ORTHOPEDICS | Facility: CLINIC | Age: 69
End: 2024-08-22
Payer: MEDICARE

## 2024-08-22 DIAGNOSIS — Z96.651 S/P TOTAL KNEE REPLACEMENT, RIGHT: ICD-10-CM

## 2024-08-22 RX ORDER — HYDROCODONE BITARTRATE AND ACETAMINOPHEN 5; 325 MG/1; MG/1
1 TABLET ORAL EVERY 6 HOURS PRN
Qty: 28 TABLET | Refills: 0 | Status: SHIPPED | OUTPATIENT
Start: 2024-08-22

## 2024-08-29 ENCOUNTER — PATIENT MESSAGE (OUTPATIENT)
Dept: ORTHOPEDICS | Facility: CLINIC | Age: 69
End: 2024-08-29
Payer: MEDICARE

## 2024-08-29 DIAGNOSIS — Z96.651 S/P TOTAL KNEE REPLACEMENT, RIGHT: ICD-10-CM

## 2024-08-29 RX ORDER — HYDROCODONE BITARTRATE AND ACETAMINOPHEN 5; 325 MG/1; MG/1
1 TABLET ORAL EVERY 6 HOURS PRN
Qty: 28 TABLET | Refills: 0 | Status: SHIPPED | OUTPATIENT
Start: 2024-08-29

## 2024-08-30 DIAGNOSIS — M25.569 KNEE PAIN, UNSPECIFIED CHRONICITY, UNSPECIFIED LATERALITY: Primary | ICD-10-CM

## 2024-09-04 DIAGNOSIS — R11.0 NAUSEA: ICD-10-CM

## 2024-09-04 RX ORDER — ONDANSETRON HYDROCHLORIDE 8 MG/1
8 TABLET, FILM COATED ORAL EVERY 8 HOURS PRN
Qty: 40 TABLET | Refills: 0 | Status: SHIPPED | OUTPATIENT
Start: 2024-09-04

## 2024-09-05 ENCOUNTER — HOSPITAL ENCOUNTER (OUTPATIENT)
Dept: RADIOLOGY | Facility: HOSPITAL | Age: 69
Discharge: HOME | End: 2024-09-05
Attending: ORTHOPAEDIC SURGERY
Payer: MEDICARE

## 2024-09-05 ENCOUNTER — OFFICE VISIT (OUTPATIENT)
Dept: ORTHOPEDICS | Facility: CLINIC | Age: 69
End: 2024-09-05
Payer: MEDICARE

## 2024-09-05 VITALS — RESPIRATION RATE: 18 BRPM | BODY MASS INDEX: 28.67 KG/M2 | WEIGHT: 161.81 LBS | HEIGHT: 63 IN

## 2024-09-05 DIAGNOSIS — M25.661 KNEE STIFFNESS, RIGHT: Primary | ICD-10-CM

## 2024-09-05 DIAGNOSIS — Z96.651 S/P TOTAL KNEE REPLACEMENT, RIGHT: Primary | ICD-10-CM

## 2024-09-05 DIAGNOSIS — M25.661 KNEE STIFFNESS, RIGHT: ICD-10-CM

## 2024-09-05 DIAGNOSIS — M25.661 KNEE STIFF, RIGHT: ICD-10-CM

## 2024-09-05 DIAGNOSIS — M25.569 KNEE PAIN, UNSPECIFIED CHRONICITY, UNSPECIFIED LATERALITY: ICD-10-CM

## 2024-09-05 PROCEDURE — 99213 OFFICE O/P EST LOW 20 MIN: CPT | Mod: PBBFAC,25,PO | Performed by: ORTHOPAEDIC SURGERY

## 2024-09-05 PROCEDURE — 73564 X-RAY EXAM KNEE 4 OR MORE: CPT | Mod: 26,50,, | Performed by: RADIOLOGY

## 2024-09-05 PROCEDURE — 99214 OFFICE O/P EST MOD 30 MIN: CPT | Mod: 57,S$PBB,, | Performed by: ORTHOPAEDIC SURGERY

## 2024-09-05 PROCEDURE — 99999 PR PBB SHADOW E&M-EST. PATIENT-LVL III: CPT | Mod: PBBFAC,,, | Performed by: ORTHOPAEDIC SURGERY

## 2024-09-05 PROCEDURE — 73564 X-RAY EXAM KNEE 4 OR MORE: CPT | Mod: TC,50,PO

## 2024-09-05 RX ORDER — MUPIROCIN 20 MG/G
OINTMENT TOPICAL
OUTPATIENT
Start: 2024-09-05

## 2024-09-05 RX ORDER — HYDROCODONE BITARTRATE AND ACETAMINOPHEN 10; 325 MG/1; MG/1
1 TABLET ORAL EVERY 6 HOURS PRN
Qty: 28 TABLET | Refills: 0 | Status: SHIPPED | OUTPATIENT
Start: 2024-09-05

## 2024-09-05 NOTE — H&P (VIEW-ONLY)
Past Medical History:   Diagnosis Date    Allergy     Arthritis     Coronary artery disease     GERD (gastroesophageal reflux disease)     Hypertension     Ulcer        Past Surgical History:   Procedure Laterality Date    CARDIAC SURGERY      angiogram    CHOLECYSTECTOMY      EYE SURGERY      lasik    HYSTERECTOMY      KNEE SURGERY      scope    ROBOTIC ARTHROPLASTY, KNEE Right 6/25/2024    Procedure: ROBOTIC ARTHROPLASTY, KNEE, TOTAL;  Surgeon: Rod Orellana MD;  Location: [facility];  Service: Orthopedics;  Laterality: Right;  NO SPINAL !!// cruzito    TONSILLECTOMY, ADENOIDECTOMY, BILATERAL MYRINGOTOMY AND TUBES      TUBAL LIGATION         Current Outpatient Medications   Medication Sig    acetaminophen (TYLENOL) 500 MG tablet Take 2 tablets (1,000 mg total) by mouth every 8 (eight) hours as needed for Pain.    BORON ORAL Take by mouth.    HYDROcodone-acetaminophen (NORCO) 5-325 mg per tablet Take 1 tablet by mouth every 6 (six) hours as needed for Pain.    ondansetron (ZOFRAN) 8 MG tablet Take 1 tablet (8 mg total) by mouth every 8 (eight) hours as needed for Nausea.    ondansetron (ZOFRAN-ODT) 8 MG TbDL TAKE 1 TABLET (8 MG TOTAL) BY MOUTH EVERY 6 HOURS AS NEEDED FOR NAUSEA    UNABLE TO FIND Take 1 capsule by mouth Daily. medication name:NMN supplement     No current facility-administered medications for this visit.       Review of patient's allergies indicates:   Allergen Reactions    Oxycodone Swelling     Other reaction(s):swelling of face  Other reaction(s): Flushing (skin)    Amoxicillin Nausea Only     Cannot tolerate side effects    Doxycycline Nausea Only    Erythromycin Nausea Only     Other reaction(s): Nausea    Imitrex [sumatriptan succinate] Swelling and Palpitations     Other reaction(s): swelling  Other reaction(s): chest pains    Phenergan [promethazine] Other (See Comments)     Other reaction(s): Muscle pain  Muscle pain    Sulfa (sulfonamide antibiotics) Nausea And Vomiting        Family History   Problem Relation Name Age of Onset    Heart failure Mother      Stroke Mother      Macular degeneration Mother      Hypertension Mother      Heart attack Father      Emphysema Father      Lung cancer Father      Heart attack Sister      Breast cancer Maternal Grandmother      Heart attack Maternal Grandmother      Blindness Maternal Grandmother      Cataracts Maternal Grandmother      Breast cancer Paternal Grandmother      Heart attack Paternal Grandmother      Blindness Paternal Grandmother      Cataracts Paternal Grandmother      Diabetes Paternal Grandmother      Amblyopia Neg Hx      Cancer Neg Hx      Glaucoma Neg Hx      Retinal detachment Neg Hx      Strabismus Neg Hx      Thyroid disease Neg Hx         Social History     Socioeconomic History    Marital status:    Tobacco Use    Smoking status: Never    Smokeless tobacco: Never   Substance and Sexual Activity    Alcohol use: No    Drug use: No    Sexual activity: Yes     Partners: Male     Social Determinants of Health     Financial Resource Strain: Low Risk  (5/17/2024)    Overall Financial Resource Strain (CARDIA)     Difficulty of Paying Living Expenses: Not hard at all   Food Insecurity: No Food Insecurity (5/17/2024)    Hunger Vital Sign     Worried About Running Out of Food in the Last Year: Never true     Ran Out of Food in the Last Year: Never true   Transportation Needs: No Transportation Needs (9/19/2022)    PRAPARE - Transportation     Lack of Transportation (Medical): No     Lack of Transportation (Non-Medical): No   Physical Activity: Inactive (5/17/2024)    Exercise Vital Sign     Days of Exercise per Week: 0 days     Minutes of Exercise per Session: 0 min   Stress: No Stress Concern Present (5/17/2024)    Citizen of [place] of Occupational Health - Occupational Stress Questionnaire     Feeling of Stress : Not at all   Housing Stability: Low Risk  (9/19/2022)    Housing Stability Vital Sign     Unable to Pay for  Housing in the Last Year: No     Number of Places Lived in the Last Year: 2     Unstable Housing in the Last Year: No       Chief Complaint: No chief complaint on file.      Date of surgery:  June 25, 2024    History of present illness:  69-year-old female underwent right robotic assisted total knee arthroplasty.  She is doing better but still has pretty significant stiffness.  Range of motion is about 3° to 71°.  Slowly making progress with physical therapy.  Never did get the [place] splint.    Review of Systems:    Musculoskeletal:  See HPI        Physical Examination:    Vital Signs:  There were no vitals filed for this visit.    There is no height or weight on file to calculate BMI.    This a well-developed, well nourished patient in no acute distress.  They are alert and oriented and cooperative to examination.  Pt. walks with a very slow antalgic gait using a walker    Examination of the right knee shows well-healed surgical incision.  No erythema drainage.   Still has a little swelling in the leg and knee.  Range of motion is about 3° to 70°.  No calf pain.    Heart is regular rate without obvious murmurs   Normal respiratory effort without audible wheezing  Abdomen is soft and nontender     X-rays:  Two views of the right knee are  review which show well-aligned right total knee arthroplasty without complication     Assessment::  Status post right robotic assisted total knee arthroplasty    Plan:  I reviewed the findings with her today.  I recommended progressing with knee manipulation.  Patient has not reached 90° yet.  Plan is for right knee manipulation under anesthesia.  Increased her pain medicine to hydrocodone 10s again.  Risks, benefits, and alternatives to the procedure were explained to the patient including but not limited to damage to nerves, arteries, blood vessels, bones, tendons, ligaments, stiffness, instability, infection, permanent limb dysfunction, DVT, PE, as well as general anesthetic  complications including seizure, stroke, heart attack and even death. The patient understood these risks and wished to proceed and signed the informed consent.         This note was created using M Modal voice recognition software that occasionally misinterpreted phrases or words.

## 2024-09-05 NOTE — PROGRESS NOTES
Past Medical History:   Diagnosis Date    Allergy     Arthritis     Coronary artery disease     GERD (gastroesophageal reflux disease)     Hypertension     Ulcer        Past Surgical History:   Procedure Laterality Date    CARDIAC SURGERY      angiogram    CHOLECYSTECTOMY      EYE SURGERY      lasik    HYSTERECTOMY      KNEE SURGERY      scope    ROBOTIC ARTHROPLASTY, KNEE Right 6/25/2024    Procedure: ROBOTIC ARTHROPLASTY, KNEE, TOTAL;  Surgeon: Rod Orellana MD;  Location: [facility];  Service: Orthopedics;  Laterality: Right;  NO SPINAL !!// cruzito    TONSILLECTOMY, ADENOIDECTOMY, BILATERAL MYRINGOTOMY AND TUBES      TUBAL LIGATION         Current Outpatient Medications   Medication Sig    acetaminophen (TYLENOL) 500 MG tablet Take 2 tablets (1,000 mg total) by mouth every 8 (eight) hours as needed for Pain.    BORON ORAL Take by mouth.    HYDROcodone-acetaminophen (NORCO) 5-325 mg per tablet Take 1 tablet by mouth every 6 (six) hours as needed for Pain.    ondansetron (ZOFRAN) 8 MG tablet Take 1 tablet (8 mg total) by mouth every 8 (eight) hours as needed for Nausea.    ondansetron (ZOFRAN-ODT) 8 MG TbDL TAKE 1 TABLET (8 MG TOTAL) BY MOUTH EVERY 6 HOURS AS NEEDED FOR NAUSEA    UNABLE TO FIND Take 1 capsule by mouth Daily. medication name:NMN supplement     No current facility-administered medications for this visit.       Review of patient's allergies indicates:   Allergen Reactions    Oxycodone Swelling     Other reaction(s):swelling of face  Other reaction(s): Flushing (skin)    Amoxicillin Nausea Only     Cannot tolerate side effects    Doxycycline Nausea Only    Erythromycin Nausea Only     Other reaction(s): Nausea    Imitrex [sumatriptan succinate] Swelling and Palpitations     Other reaction(s): swelling  Other reaction(s): chest pains    Phenergan [promethazine] Other (See Comments)     Other reaction(s): Muscle pain  Muscle pain    Sulfa (sulfonamide antibiotics) Nausea And Vomiting        Family History   Problem Relation Name Age of Onset    Heart failure Mother      Stroke Mother      Macular degeneration Mother      Hypertension Mother      Heart attack Father      Emphysema Father      Lung cancer Father      Heart attack Sister      Breast cancer Maternal Grandmother      Heart attack Maternal Grandmother      Blindness Maternal Grandmother      Cataracts Maternal Grandmother      Breast cancer Paternal Grandmother      Heart attack Paternal Grandmother      Blindness Paternal Grandmother      Cataracts Paternal Grandmother      Diabetes Paternal Grandmother      Amblyopia Neg Hx      Cancer Neg Hx      Glaucoma Neg Hx      Retinal detachment Neg Hx      Strabismus Neg Hx      Thyroid disease Neg Hx         Social History     Socioeconomic History    Marital status:    Tobacco Use    Smoking status: Never    Smokeless tobacco: Never   Substance and Sexual Activity    Alcohol use: No    Drug use: No    Sexual activity: Yes     Partners: Male     Social Determinants of Health     Financial Resource Strain: Low Risk  (5/17/2024)    Overall Financial Resource Strain (CARDIA)     Difficulty of Paying Living Expenses: Not hard at all   Food Insecurity: No Food Insecurity (5/17/2024)    Hunger Vital Sign     Worried About Running Out of Food in the Last Year: Never true     Ran Out of Food in the Last Year: Never true   Transportation Needs: No Transportation Needs (9/19/2022)    PRAPARE - Transportation     Lack of Transportation (Medical): No     Lack of Transportation (Non-Medical): No   Physical Activity: Inactive (5/17/2024)    Exercise Vital Sign     Days of Exercise per Week: 0 days     Minutes of Exercise per Session: 0 min   Stress: No Stress Concern Present (5/17/2024)    [place] of Occupational Health - Occupational Stress Questionnaire     Feeling of Stress : Not at all   Housing Stability: Low Risk  (9/19/2022)    Housing Stability Vital Sign     Unable to Pay for  Housing in the Last Year: No     Number of Places Lived in the Last Year: 2     Unstable Housing in the Last Year: No       Chief Complaint: No chief complaint on file.      Date of surgery:  June 25, 2024    History of present illness:  69-year-old female underwent right robotic assisted total knee arthroplasty.  She is doing better but still has pretty significant stiffness.  Range of motion is about 3° to 71°.  Slowly making progress with physical therapy.  Never did get the [place] splint.    Review of Systems:    Musculoskeletal:  See HPI        Physical Examination:    Vital Signs:  There were no vitals filed for this visit.    There is no height or weight on file to calculate BMI.    This a well-developed, well nourished patient in no acute distress.  They are alert and oriented and cooperative to examination.  Pt. walks with a very slow antalgic gait using a walker    Examination of the right knee shows well-healed surgical incision.  No erythema drainage.   Still has a little swelling in the leg and knee.  Range of motion is about 3° to 70°.  No calf pain.    Heart is regular rate without obvious murmurs   Normal respiratory effort without audible wheezing  Abdomen is soft and nontender     X-rays:  Two views of the right knee are  review which show well-aligned right total knee arthroplasty without complication     Assessment::  Status post right robotic assisted total knee arthroplasty    Plan:  I reviewed the findings with her today.  I recommended progressing with knee manipulation.  Patient has not reached 90° yet.  Plan is for right knee manipulation under anesthesia.  Increased her pain medicine to hydrocodone 10s again.  Risks, benefits, and alternatives to the procedure were explained to the patient including but not limited to damage to nerves, arteries, blood vessels, bones, tendons, ligaments, stiffness, instability, infection, permanent limb dysfunction, DVT, PE, as well as general anesthetic  complications including seizure, stroke, heart attack and even death. The patient understood these risks and wished to proceed and signed the informed consent.         This note was created using M Modal voice recognition software that occasionally misinterpreted phrases or words.

## 2024-09-06 ENCOUNTER — PATIENT MESSAGE (OUTPATIENT)
Dept: ORTHOPEDICS | Facility: CLINIC | Age: 69
End: 2024-09-06
Payer: MEDICARE

## 2024-09-06 NOTE — PLAN OF CARE
Pre-admit phone call complete. Patient verbalized understanding of procedure instructions and education. Confirmation sent to [place] via message. Pt denied any questions or concerns at this time.

## 2024-09-09 ENCOUNTER — ANESTHESIA EVENT (OUTPATIENT)
Dept: SURGERY | Facility: HOSPITAL | Age: 69
End: 2024-09-09
Payer: MEDICARE

## 2024-09-10 ENCOUNTER — ANESTHESIA (OUTPATIENT)
Dept: SURGERY | Facility: HOSPITAL | Age: 69
End: 2024-09-10
Payer: MEDICARE

## 2024-09-10 ENCOUNTER — HOSPITAL ENCOUNTER (OUTPATIENT)
Facility: HOSPITAL | Age: 69
Discharge: HOME | End: 2024-09-11
Attending: ORTHOPAEDIC SURGERY | Admitting: ORTHOPAEDIC SURGERY
Payer: MEDICARE

## 2024-09-10 DIAGNOSIS — R07.9 CHEST PAIN: ICD-10-CM

## 2024-09-10 DIAGNOSIS — M25.661 KNEE STIFF, RIGHT: ICD-10-CM

## 2024-09-10 DIAGNOSIS — M25.661 KNEE STIFFNESS, RIGHT: ICD-10-CM

## 2024-09-10 LAB
ANION GAP SERPL CALC-SCNC: 14 MMOL/L (ref 8–16)
BASOPHILS # BLD AUTO: 0.03 K/UL (ref 0–0.2)
BASOPHILS NFR BLD: 0.4 % (ref 0–1.9)
BUN SERPL-MCNC: 14 MG/DL (ref 8–23)
CALCIUM SERPL-MCNC: 10.2 MG/DL (ref 8.7–10.5)
CHLORIDE SERPL-SCNC: 107 MMOL/L (ref 95–110)
CO2 SERPL-SCNC: 24 MMOL/L (ref 23–29)
CREAT SERPL-MCNC: 0.9 MG/DL (ref 0.5–1.4)
DIFFERENTIAL METHOD BLD: ABNORMAL
EOSINOPHIL # BLD AUTO: 0.1 K/UL (ref 0–0.5)
EOSINOPHIL NFR BLD: 0.8 % (ref 0–8)
ERYTHROCYTE [DISTWIDTH] IN BLOOD BY AUTOMATED COUNT: 12.7 % (ref 11.5–14.5)
EST. GFR  (NO RACE VARIABLE): >60 ML/MIN/1.73 M^2
GLUCOSE SERPL-MCNC: 85 MG/DL (ref 70–110)
HCT VFR BLD AUTO: 47.3 % (ref 37–48.5)
HGB BLD-MCNC: 15.1 G/DL (ref 12–16)
IMM GRANULOCYTES # BLD AUTO: 0.01 K/UL (ref 0–0.04)
IMM GRANULOCYTES NFR BLD AUTO: 0.1 % (ref 0–0.5)
LYMPHOCYTES # BLD AUTO: 2.7 K/UL (ref 1–4.8)
LYMPHOCYTES NFR BLD: 32.3 % (ref 18–48)
MCH RBC QN AUTO: 28.1 PG (ref 27–31)
MCHC RBC AUTO-ENTMCNC: 31.9 G/DL (ref 32–36)
MCV RBC AUTO: 88 FL (ref 82–98)
MONOCYTES # BLD AUTO: 0.6 K/UL (ref 0.3–1)
MONOCYTES NFR BLD: 7.2 % (ref 4–15)
NEUTROPHILS # BLD AUTO: 4.9 K/UL (ref 1.8–7.7)
NEUTROPHILS NFR BLD: 59.2 % (ref 38–73)
NRBC BLD-RTO: 0 /100 WBC
PLATELET # BLD AUTO: 345 K/UL (ref 150–450)
PMV BLD AUTO: 10.5 FL (ref 9.2–12.9)
POTASSIUM SERPL-SCNC: 4 MMOL/L (ref 3.5–5.1)
RBC # BLD AUTO: 5.37 M/UL (ref 4–5.4)
SODIUM SERPL-SCNC: 145 MMOL/L (ref 136–145)
WBC # BLD AUTO: 8.29 K/UL (ref 3.9–12.7)

## 2024-09-10 PROCEDURE — 94799 UNLISTED PULMONARY SVC/PX: CPT

## 2024-09-10 PROCEDURE — 63600175 PHARM REV CODE 636 W HCPCS: Performed by: ANESTHESIOLOGY

## 2024-09-10 PROCEDURE — 71000016 HC POSTOP RECOV ADDL HR: Performed by: ORTHOPAEDIC SURGERY

## 2024-09-10 PROCEDURE — 25000003 PHARM REV CODE 250: Performed by: ORTHOPAEDIC SURGERY

## 2024-09-10 PROCEDURE — 80048 BASIC METABOLIC PNL TOTAL CA: CPT | Performed by: ORTHOPAEDIC SURGERY

## 2024-09-10 PROCEDURE — 71000015 HC POSTOP RECOV 1ST HR: Performed by: ORTHOPAEDIC SURGERY

## 2024-09-10 PROCEDURE — 85025 COMPLETE CBC W/AUTO DIFF WBC: CPT | Performed by: ORTHOPAEDIC SURGERY

## 2024-09-10 PROCEDURE — 71000033 HC RECOVERY, INTIAL HOUR: Performed by: ORTHOPAEDIC SURGERY

## 2024-09-10 PROCEDURE — 36000704 HC OR TIME LEV I 1ST 15 MIN: Performed by: ORTHOPAEDIC SURGERY

## 2024-09-10 PROCEDURE — 64447 NJX AA&/STRD FEMORAL NRV IMG: CPT | Performed by: ANESTHESIOLOGY

## 2024-09-10 PROCEDURE — 99900035 HC TECH TIME PER 15 MIN (STAT)

## 2024-09-10 PROCEDURE — 25000003 PHARM REV CODE 250: Performed by: ANESTHESIOLOGY

## 2024-09-10 PROCEDURE — 94760 N-INVAS EAR/PLS OXIMETRY 1: CPT

## 2024-09-10 PROCEDURE — 37000008 HC ANESTHESIA 1ST 15 MINUTES: Performed by: ORTHOPAEDIC SURGERY

## 2024-09-10 PROCEDURE — 25000003 PHARM REV CODE 250: Performed by: STUDENT IN AN ORGANIZED HEALTH CARE EDUCATION/TRAINING PROGRAM

## 2024-09-10 PROCEDURE — 63600175 PHARM REV CODE 636 W HCPCS: Performed by: STUDENT IN AN ORGANIZED HEALTH CARE EDUCATION/TRAINING PROGRAM

## 2024-09-10 PROCEDURE — 27570 FIXATION OF KNEE JOINT: CPT | Mod: 78,RT,, | Performed by: ORTHOPAEDIC SURGERY

## 2024-09-10 PROCEDURE — 25000003 PHARM REV CODE 250: Performed by: NURSE PRACTITIONER

## 2024-09-10 PROCEDURE — 94799 UNLISTED PULMONARY SVC/PX: CPT | Mod: XB

## 2024-09-10 PROCEDURE — C9290 INJ, BUPIVACAINE LIPOSOME: HCPCS | Performed by: ANESTHESIOLOGY

## 2024-09-10 PROCEDURE — 36415 COLL VENOUS BLD VENIPUNCTURE: CPT | Performed by: ORTHOPAEDIC SURGERY

## 2024-09-10 PROCEDURE — 71000039 HC RECOVERY, EACH ADD'L HOUR: Performed by: ORTHOPAEDIC SURGERY

## 2024-09-10 RX ORDER — LANOLIN ALCOHOL/MO/W.PET/CERES
800 CREAM (GRAM) TOPICAL
Status: DISCONTINUED | OUTPATIENT
Start: 2024-09-10 | End: 2024-09-11 | Stop reason: HOSPADM

## 2024-09-10 RX ORDER — SODIUM CHLORIDE, SODIUM LACTATE, POTASSIUM CHLORIDE, CALCIUM CHLORIDE 600; 310; 30; 20 MG/100ML; MG/100ML; MG/100ML; MG/100ML
INJECTION, SOLUTION INTRAVENOUS CONTINUOUS
Status: DISCONTINUED | OUTPATIENT
Start: 2024-09-10 | End: 2024-09-10

## 2024-09-10 RX ORDER — LIDOCAINE HYDROCHLORIDE 20 MG/ML
INJECTION INTRAVENOUS
Status: DISCONTINUED | OUTPATIENT
Start: 2024-09-10 | End: 2024-09-10

## 2024-09-10 RX ORDER — ALUMINUM HYDROXIDE, MAGNESIUM HYDROXIDE, AND SIMETHICONE 1200; 120; 1200 MG/30ML; MG/30ML; MG/30ML
30 SUSPENSION ORAL 4 TIMES DAILY PRN
Status: DISCONTINUED | OUTPATIENT
Start: 2024-09-10 | End: 2024-09-11 | Stop reason: HOSPADM

## 2024-09-10 RX ORDER — HYDROCODONE BITARTRATE AND ACETAMINOPHEN 5; 325 MG/1; MG/1
1 TABLET ORAL ONCE AS NEEDED
Status: COMPLETED | OUTPATIENT
Start: 2024-09-10 | End: 2024-09-10

## 2024-09-10 RX ORDER — GLUCAGON 1 MG
1 KIT INJECTION
Status: DISCONTINUED | OUTPATIENT
Start: 2024-09-10 | End: 2024-09-11 | Stop reason: HOSPADM

## 2024-09-10 RX ORDER — BUPIVACAINE HYDROCHLORIDE 5 MG/ML
INJECTION, SOLUTION EPIDURAL; INTRACAUDAL
Status: COMPLETED | OUTPATIENT
Start: 2024-09-10 | End: 2024-09-10

## 2024-09-10 RX ORDER — HYDRALAZINE HYDROCHLORIDE 20 MG/ML
5 INJECTION INTRAMUSCULAR; INTRAVENOUS ONCE AS NEEDED
Status: DISCONTINUED | OUTPATIENT
Start: 2024-09-10 | End: 2024-09-10

## 2024-09-10 RX ORDER — ONDANSETRON HYDROCHLORIDE 2 MG/ML
4 INJECTION, SOLUTION INTRAVENOUS EVERY 6 HOURS PRN
Status: DISCONTINUED | OUTPATIENT
Start: 2024-09-10 | End: 2024-09-11 | Stop reason: HOSPADM

## 2024-09-10 RX ORDER — SODIUM,POTASSIUM PHOSPHATES 280-250MG
2 POWDER IN PACKET (EA) ORAL
Status: DISCONTINUED | OUTPATIENT
Start: 2024-09-10 | End: 2024-09-11 | Stop reason: HOSPADM

## 2024-09-10 RX ORDER — ACETAMINOPHEN 325 MG/1
650 TABLET ORAL EVERY 8 HOURS PRN
Status: DISCONTINUED | OUTPATIENT
Start: 2024-09-10 | End: 2024-09-11 | Stop reason: HOSPADM

## 2024-09-10 RX ORDER — MUPIROCIN 20 MG/G
OINTMENT TOPICAL
Status: DISCONTINUED | OUTPATIENT
Start: 2024-09-10 | End: 2024-09-10 | Stop reason: HOSPADM

## 2024-09-10 RX ORDER — ONDANSETRON HYDROCHLORIDE 2 MG/ML
INJECTION, SOLUTION INTRAVENOUS
Status: DISCONTINUED | OUTPATIENT
Start: 2024-09-10 | End: 2024-09-10

## 2024-09-10 RX ORDER — LORAZEPAM 2 MG/ML
0.25 INJECTION INTRAMUSCULAR ONCE
Status: COMPLETED | OUTPATIENT
Start: 2024-09-10 | End: 2024-09-10

## 2024-09-10 RX ORDER — AMOXICILLIN 250 MG
1 CAPSULE ORAL DAILY
Status: DISCONTINUED | OUTPATIENT
Start: 2024-09-10 | End: 2024-09-11 | Stop reason: HOSPADM

## 2024-09-10 RX ORDER — NALOXONE HCL 0.4 MG/ML
0.02 VIAL (ML) INJECTION
Status: DISCONTINUED | OUTPATIENT
Start: 2024-09-10 | End: 2024-09-11 | Stop reason: HOSPADM

## 2024-09-10 RX ORDER — DOCUSATE SODIUM 100 MG/1
100 CAPSULE, LIQUID FILLED ORAL 2 TIMES DAILY
Status: DISCONTINUED | OUTPATIENT
Start: 2024-09-10 | End: 2024-09-11 | Stop reason: HOSPADM

## 2024-09-10 RX ORDER — FENTANYL CITRATE 50 UG/ML
25-200 INJECTION, SOLUTION INTRAMUSCULAR; INTRAVENOUS
Status: DISCONTINUED | OUTPATIENT
Start: 2024-09-10 | End: 2024-09-10

## 2024-09-10 RX ORDER — ONDANSETRON HYDROCHLORIDE 2 MG/ML
4 INJECTION, SOLUTION INTRAVENOUS ONCE AS NEEDED
Status: DISCONTINUED | OUTPATIENT
Start: 2024-09-10 | End: 2024-09-10

## 2024-09-10 RX ORDER — MIDAZOLAM HYDROCHLORIDE 1 MG/ML
2 INJECTION, SOLUTION INTRAMUSCULAR; INTRAVENOUS
Status: DISCONTINUED | OUTPATIENT
Start: 2024-09-10 | End: 2024-09-10

## 2024-09-10 RX ORDER — TALC
6 POWDER (GRAM) TOPICAL NIGHTLY PRN
Status: DISCONTINUED | OUTPATIENT
Start: 2024-09-10 | End: 2024-09-11 | Stop reason: HOSPADM

## 2024-09-10 RX ORDER — NAPROXEN SODIUM 220 MG/1
81 TABLET, FILM COATED ORAL 2 TIMES DAILY
Status: DISCONTINUED | OUTPATIENT
Start: 2024-09-11 | End: 2024-09-11 | Stop reason: HOSPADM

## 2024-09-10 RX ORDER — ACETAMINOPHEN 325 MG/1
650 TABLET ORAL EVERY 4 HOURS PRN
Status: DISCONTINUED | OUTPATIENT
Start: 2024-09-10 | End: 2024-09-11 | Stop reason: HOSPADM

## 2024-09-10 RX ORDER — OXYCODONE HYDROCHLORIDE 5 MG/1
5 TABLET ORAL ONCE AS NEEDED
Status: DISCONTINUED | OUTPATIENT
Start: 2024-09-10 | End: 2024-09-10

## 2024-09-10 RX ORDER — HYDROCODONE BITARTRATE AND ACETAMINOPHEN 5; 325 MG/1; MG/1
1 TABLET ORAL EVERY 6 HOURS PRN
Status: DISCONTINUED | OUTPATIENT
Start: 2024-09-10 | End: 2024-09-11 | Stop reason: HOSPADM

## 2024-09-10 RX ORDER — IBUPROFEN 200 MG
24 TABLET ORAL
Status: DISCONTINUED | OUTPATIENT
Start: 2024-09-10 | End: 2024-09-11 | Stop reason: HOSPADM

## 2024-09-10 RX ORDER — PROPOFOL 10 MG/ML
VIAL (ML) INTRAVENOUS
Status: DISCONTINUED | OUTPATIENT
Start: 2024-09-10 | End: 2024-09-10

## 2024-09-10 RX ORDER — IBUPROFEN 200 MG
16 TABLET ORAL
Status: DISCONTINUED | OUTPATIENT
Start: 2024-09-10 | End: 2024-09-11 | Stop reason: HOSPADM

## 2024-09-10 RX ORDER — FENTANYL CITRATE 50 UG/ML
25 INJECTION, SOLUTION INTRAMUSCULAR; INTRAVENOUS EVERY 5 MIN PRN
Status: COMPLETED | OUTPATIENT
Start: 2024-09-10 | End: 2024-09-10

## 2024-09-10 RX ORDER — LIDOCAINE HYDROCHLORIDE 10 MG/ML
1 INJECTION, SOLUTION EPIDURAL; INFILTRATION; INTRACAUDAL; PERINEURAL ONCE
Status: DISCONTINUED | OUTPATIENT
Start: 2024-09-10 | End: 2024-09-10 | Stop reason: HOSPADM

## 2024-09-10 RX ORDER — HYDROCODONE BITARTRATE AND ACETAMINOPHEN 10; 325 MG/1; MG/1
1 TABLET ORAL EVERY 4 HOURS PRN
Status: DISCONTINUED | OUTPATIENT
Start: 2024-09-10 | End: 2024-09-11 | Stop reason: HOSPADM

## 2024-09-10 RX ADMIN — DOCUSATE SODIUM 100 MG: 100 CAPSULE, LIQUID FILLED ORAL at 08:09

## 2024-09-10 RX ADMIN — FENTANYL CITRATE 25 MCG: 50 INJECTION INTRAMUSCULAR; INTRAVENOUS at 07:09

## 2024-09-10 RX ADMIN — ONDANSETRON 4 MG: 2 INJECTION INTRAMUSCULAR; INTRAVENOUS at 06:09

## 2024-09-10 RX ADMIN — Medication 20 MG: at 07:09

## 2024-09-10 RX ADMIN — MUPIROCIN 1 G: 20 OINTMENT TOPICAL at 06:09

## 2024-09-10 RX ADMIN — BUPIVACAINE 20 ML: 13.3 INJECTION, SUSPENSION, LIPOSOMAL INFILTRATION at 06:09

## 2024-09-10 RX ADMIN — HYDROCODONE BITARTRATE AND ACETAMINOPHEN 1 TABLET: 10; 325 TABLET ORAL at 04:09

## 2024-09-10 RX ADMIN — SODIUM CHLORIDE, SODIUM GLUCONATE, SODIUM ACETATE, POTASSIUM CHLORIDE AND MAGNESIUM CHLORIDE: 526; 502; 368; 37; 30 INJECTION, SOLUTION INTRAVENOUS at 06:09

## 2024-09-10 RX ADMIN — LIDOCAINE HYDROCHLORIDE 100 MG: 20 INJECTION, SOLUTION INTRAVENOUS at 07:09

## 2024-09-10 RX ADMIN — HYDRALAZINE HYDROCHLORIDE 5 MG: 20 INJECTION INTRAMUSCULAR; INTRAVENOUS at 07:09

## 2024-09-10 RX ADMIN — MELATONIN TAB 3 MG 6 MG: 3 TAB at 09:09

## 2024-09-10 RX ADMIN — HYDROCODONE BITARTRATE AND ACETAMINOPHEN 1 TABLET: 5; 325 TABLET ORAL at 08:09

## 2024-09-10 RX ADMIN — MIDAZOLAM HYDROCHLORIDE 2 MG: 1 INJECTION, SOLUTION INTRAMUSCULAR; INTRAVENOUS at 06:09

## 2024-09-10 RX ADMIN — LORAZEPAM 0.25 MG: 2 INJECTION INTRAMUSCULAR; INTRAVENOUS at 07:09

## 2024-09-10 RX ADMIN — HYDROCODONE BITARTRATE AND ACETAMINOPHEN 1 TABLET: 10; 325 TABLET ORAL at 08:09

## 2024-09-10 RX ADMIN — FENTANYL CITRATE 100 MCG: 50 INJECTION, SOLUTION INTRAMUSCULAR; INTRAVENOUS at 06:09

## 2024-09-10 RX ADMIN — Medication 50 MG: at 07:09

## 2024-09-10 RX ADMIN — HYDROCODONE BITARTRATE AND ACETAMINOPHEN 1 TABLET: 10; 325 TABLET ORAL at 11:09

## 2024-09-10 RX ADMIN — BUPIVACAINE HYDROCHLORIDE 10 ML: 5 INJECTION, SOLUTION EPIDURAL; INTRACAUDAL; PERINEURAL at 06:09

## 2024-09-10 NOTE — PLAN OF CARE
Patient continues to complain of paint to right knee/leg.  9/10 on pain scale.  Message sent to Dr Orellana and new order received for po pain medication.

## 2024-09-10 NOTE — ANESTHESIA PREPROCEDURE EVALUATION
09/10/2024  Abi Duran is a 69 y.o., female.      Pre-op Assessment    I have reviewed the Patient Summary Reports.     I have reviewed the Nursing Notes. I have reviewed the NPO Status.   I have reviewed the Medications.     Review of Systems  Anesthesia Hx:  No problems with previous Anesthesia             Denies Family Hx of Anesthesia complications.    Denies Personal Hx of Anesthesia complications.                    Hematology/Oncology:       -- Anemia:                                  Cardiovascular:     Hypertension   CAD              ECG has been reviewed.    Coronary Artery Disease:                            Hypertension         Hepatic/GI:     GERD      Gerd          Musculoskeletal:  Arthritis               Neurological:      Headaches      Dx of Headaches                               Physical Exam  General: Cooperative, Alert and Oriented    Airway:  Mallampati: II   Mouth Opening: Normal  TM Distance: Normal  Neck ROM: Normal ROM    Chest/Lungs:  Normal Respiratory Rate    Heart:  Rate: Normal  Rhythm: Regular Rhythm        Anesthesia Plan  Type of Anesthesia, risks & benefits discussed:    Anesthesia Type: Gen Natural Airway  Intra-op Monitoring Plan: Standard ASA Monitors  Post Op Pain Control Plan: multimodal analgesia and peripheral nerve block  Informed Consent: Informed consent signed with the Patient and all parties understand the risks and agree with anesthesia plan.  All questions answered.   ASA Score: 2    Ready For Surgery From Anesthesia Perspective.     .

## 2024-09-10 NOTE — PT/OT/SLP PROGRESS
Physical Therapy      Patient Name:  Abi Duran   MRN:  [id number]    Patient not seen today secondary to Nurse/ DEJON hold (nurse Naz said PT order was for tomorrow). Will follow-up 9/11/24.

## 2024-09-10 NOTE — PLAN OF CARE
Spoke with Dr Ocasio concerning patient and pain that she continues to complain of to right knee.  Dr Ocasio went to OR room to speak to Dr Orellana and states that Dr Orellana will come and see the patient as soon as he is done.

## 2024-09-10 NOTE — TRANSFER OF CARE
"Anesthesia Transfer of Care Note    Patient: Abi Duran    Procedure(s) Performed: Procedure(s) (LRB):  MANIPULATION, KNEE (Right)    Patient location: PACU    Anesthesia Type: general    Transport from OR: Transported from OR on 2-3 L/min O2 by NC with adequate spontaneous ventilation    Post pain: adequate analgesia    Post assessment: no apparent anesthetic complications and tolerated procedure well    Post vital signs: stable    Level of consciousness: sedated and responds to stimulation    Nausea/Vomiting: no nausea/vomiting    Complications: none    Transfer of care protocol was followed      Last vitals: Visit Vitals  BP (!) 174/78 (BP Location: Right arm, Patient Position: Lying)   Pulse 81   Temp 36.8 °C (98.2 °F) (Skin)   Resp 14   Ht 5' 3" (1.6 m)   Wt 73.4 kg (161 lb 13.1 oz)   SpO2 100%   Breastfeeding No   BMI 28.66 kg/m²     "

## 2024-09-10 NOTE — ANESTHESIA PROCEDURE NOTES
Peripheral Block    Patient location during procedure: pre-op   Block not for primary anesthetic.  Reason for block: at surgeon's request and post-op pain management   Post-op Pain Location: knee right   Start time: 9/10/2024 6:45 AM  Timeout: 9/10/2024 6:45 AM   End time: 9/10/2024 6:49 AM    Staffing  Authorizing Provider: Jaguar Ocasio MD  Performing Provider: Jaguar Ocasio MD    Staffing  Performed by: Jaguar Ocasio MD  Authorized by: Jaguar Ocasio MD    Preanesthetic Checklist  Completed: patient identified, IV checked, site marked, risks and benefits discussed, surgical consent, monitors and equipment checked, pre-op evaluation and timeout performed  Peripheral Block  Patient position: supine  Prep: ChloraPrep  Patient monitoring: heart rate, cardiac monitor, continuous pulse ox, continuous capnometry and frequent blood pressure checks  Block type: adductor canal  Laterality: right  Injection technique: single shot  Needle  Needle type: Stimuplex   Needle gauge: 21 G  Needle length: 4 in  Needle localization: anatomical landmarks and ultrasound guidance   -ultrasound image captured on disc.  Assessment  Injection assessment: negative aspiration, negative parasthesia and local visualized surrounding nerve  Paresthesia pain: none  Heart rate change: no  Slow fractionated injection: yes  Pain Tolerance: comfortable throughout block and no complaints  Medications:    Medications: BUPivacaine liposome (PF) 1.3 % (13.3 mg/mL) suspension - Injection   20 mL - 9/10/2024 6:48:00 AM  bupivacaine (pf) (MARCAINE) injection 0.5% - Perineural   10 mL - 9/10/2024 6:48:00 AM    Additional Notes  VSS.  [facility] RN monitoring vitals throughout procedure.  Patient tolerated procedure well.

## 2024-09-10 NOTE — ANESTHESIA POSTPROCEDURE EVALUATION
Anesthesia Post Evaluation    Patient: Abi Duran    Procedure(s) Performed: Procedure(s) (LRB):  MANIPULATION, KNEE (Right)    Final Anesthesia Type: general      Patient location during evaluation: PACU  Patient participation: Yes- Able to Participate  Level of consciousness: awake and alert  Post-procedure vital signs: reviewed and stable  Pain management: adequate  Airway patency: patent    PONV status at discharge: No PONV  Anesthetic complications: no      Cardiovascular status: blood pressure returned to baseline  Respiratory status: unassisted  Hydration status: euvolemic  Follow-up not needed.              Vitals Value Taken Time   /77 09/10/24 0805   Temp 36.8 °C (98.2 °F) 09/10/24 0720   Pulse 86 09/10/24 0809   Resp 8 09/10/24 0809   SpO2 97 % 09/10/24 0809   Vitals shown include unfiled device data.      No case tracking events are documented in the log.      Pain/Penny Score: Pain Rating Prior to Med Admin: 10 (9/10/2024  7:41 AM)  Pain Rating Post Med Admin: 0 (9/10/2024  8:00 AM)  Penny Score: 9 (9/10/2024  8:00 AM)

## 2024-09-10 NOTE — PLAN OF CARE
Messaged received from Dr Orellana stating that the xray looks fine and that we can just continue to monitor the patient.  Patient and spouse notified of plan of care.

## 2024-09-10 NOTE — PLAN OF CARE
Patient is stable at this time.  VSS.   Pain to right knee/leg is a 4/10.  No complaints of nausea or vomiting.  Patient tolerating po intake well.   Patient to be seen by Mónica Zamora NP.  Postop orders received.Personal belongings transferred to room 310 with the patient.  Family at bedside.

## 2024-09-10 NOTE — DISCHARGE SUMMARY
[facility]  Discharge Note  Short Stay    Procedure(s) (LRB):  MANIPULATION, KNEE (Right)      OUTCOME: Patient tolerated treatment/procedure well without complication and is now ready for discharge.    DISPOSITION: Home or Self Care    FINAL DIAGNOSIS:  <principal problem not specified>    FOLLOWUP: In clinic    DISCHARGE INSTRUCTIONS:    Discharge Procedure Orders   Diet general     Call MD for:  temperature >100.4     Call MD for:  persistent nausea and vomiting     Call MD for:  severe uncontrolled pain     Call MD for:  difficulty breathing, headache or visual disturbances     Call MD for:  redness, tenderness, or signs of infection (pain, swelling, redness, odor or green/yellow discharge around incision site)     Call MD for:  hives     Call MD for:  persistent dizziness or light-headedness     Call MD for:  extreme fatigue     Weight bearing as tolerated        TIME SPENT ON DISCHARGE: 5 minutes

## 2024-09-10 NOTE — H&P
[facility] Medicine  History & Physical    Patient Name: Abi Duran  MRN: [id number]  Patient Class: OP- Outpatient Recovery  Admission Date: 9/10/2024  Attending Physician: Rod Orellana, *   Primary Care Provider: Andrew Perales Jr., MD         Patient information was obtained from patient and past medical records.     Subjective:     Principal Problem:Primary osteoarthritis of right knee    Chief Complaint:   Chief Complaint   Patient presents with    Knee Pain        HPI: Abi Duran is a 68 yo female with history of robotic assisted total knee arthroplasty 10 weeks ago with a postoperative course complicated by significant stiffness with limited ROM despite physical therapy.  Dr. Orellana took her for a RIGHT knee manipulation today.  She had significant pain upon awakening despite block and pain was difficult to control.  Hospital medicine was consulted for admission.  She reports pain better now-- 2/10 in severity- block does appear to be working now.  No chest pain, SOB, N/V, or other complaints acutely.  Other pertinent medical history as below:    Past Medical History:   Diagnosis Date    Allergy     Arthritis     Coronary artery disease     GERD (gastroesophageal reflux disease)     Hypertension     Ulcer        Past Surgical History:   Procedure Laterality Date    CARDIAC SURGERY      angiogram    CHOLECYSTECTOMY      EYE SURGERY      lasik    HYSTERECTOMY      KNEE SURGERY      scope    ROBOTIC ARTHROPLASTY, KNEE Right 6/25/2024    Procedure: ROBOTIC ARTHROPLASTY, KNEE, TOTAL;  Surgeon: Rod Orellana MD;  Location: [facility];  Service: Orthopedics;  Laterality: Right;  NO SPINAL !!// cruzito    TONSILLECTOMY, ADENOIDECTOMY, BILATERAL MYRINGOTOMY AND TUBES      TUBAL LIGATION         Review of patient's allergies indicates:   Allergen Reactions    Oxycodone Swelling     Other reaction(s):swelling of face  Other reaction(s): Flushing (skin)     Amoxicillin Nausea Only     Cannot tolerate side effects    Doxycycline Nausea Only    Erythromycin Nausea Only     Other reaction(s): Nausea    Imitrex [sumatriptan succinate] Swelling and Palpitations     Other reaction(s): swelling  Other reaction(s): chest pains    Phenergan [promethazine] Other (See Comments)     Other reaction(s): Muscle pain  Muscle pain    Sulfa (sulfonamide antibiotics) Nausea And Vomiting       No current facility-administered medications on file prior to encounter.     Current Outpatient Medications on File Prior to Encounter   Medication Sig    BORON ORAL Take by mouth.    ondansetron (ZOFRAN) 8 MG tablet Take 1 tablet (8 mg total) by mouth every 8 (eight) hours as needed for Nausea.    UNABLE TO FIND Take 1 capsule by mouth Daily. medication name:NMN supplement    acetaminophen (TYLENOL) 500 MG tablet Take 2 tablets (1,000 mg total) by mouth every 8 (eight) hours as needed for Pain.     Family History       Problem Relation (Age of Onset)    Blindness Maternal Grandmother, Paternal Grandmother    Breast cancer Maternal Grandmother, Paternal Grandmother    Cataracts Maternal Grandmother, Paternal Grandmother    Diabetes Paternal Grandmother    Emphysema Father    Heart attack Father, Sister, Maternal Grandmother, Paternal Grandmother    Heart failure Mother    Hypertension Mother    Lung cancer Father    Macular degeneration Mother    Stroke Mother          Tobacco Use    Smoking status: Never    Smokeless tobacco: Never   Substance and Sexual Activity    Alcohol use: No    Drug use: No    Sexual activity: Yes     Partners: Male     Review of Systems   Constitutional:  Negative for chills and fever.   HENT:  Negative for congestion, postnasal drip and trouble swallowing.    Respiratory:  Negative for cough and shortness of breath.    Cardiovascular:  Positive for leg swelling. Negative for chest pain.   Gastrointestinal:  Negative for abdominal pain, constipation, diarrhea and nausea.    Genitourinary:  Negative for difficulty urinating.   Musculoskeletal:  Positive for arthralgias, gait problem and joint swelling.   Neurological:  Negative for dizziness, light-headedness and headaches.   Psychiatric/Behavioral:  Negative for confusion.      Objective:     Vital Signs (Most Recent):  Temp: 98.1 °F (36.7 °C) (09/10/24 1540)  Pulse: 87 (09/10/24 1540)  Resp: 18 (09/10/24 1540)  BP: (!) 174/79 (09/10/24 1540)  SpO2: 98 % (09/10/24 1540) Vital Signs (24h Range):  Temp:  [98.1 °F (36.7 °C)-98.2 °F (36.8 °C)] 98.1 °F (36.7 °C)  Pulse:  [] 87  Resp:  [10-32] 18  SpO2:  [96 %-100 %] 98 %  BP: (172-239)/() 174/79     Weight: 73.4 kg (161 lb 13.1 oz)  Body mass index is 28.66 kg/m².     Physical Exam  Vitals and nursing note reviewed.   Constitutional:       Appearance: Normal appearance.   HENT:      Head: Normocephalic and atraumatic.      Mouth/Throat:      Mouth: Mucous membranes are moist.      Pharynx: Oropharynx is clear.   Eyes:      Extraocular Movements: Extraocular movements intact.      Conjunctiva/sclera: Conjunctivae normal.      Pupils: Pupils are equal, round, and reactive to light.   Cardiovascular:      Rate and Rhythm: Normal rate and regular rhythm.   Pulmonary:      Effort: Pulmonary effort is normal. No respiratory distress.      Breath sounds: Normal breath sounds.   Abdominal:      General: Abdomen is flat. Bowel sounds are normal. There is no distension.      Palpations: Abdomen is soft.      Tenderness: There is no abdominal tenderness.   Musculoskeletal:      Cervical back: Normal range of motion and neck supple.      Comments: RIGHT knee incision clean, dry.  No erythema, but some warmth  + edema RIGHT knee  2+ pulse   Skin:     General: Skin is warm and dry.      Capillary Refill: Capillary refill takes less than 2 seconds.   Neurological:      General: No focal deficit present.      Mental Status: She is alert and oriented to person, place, and time. Mental status  is at baseline.   Psychiatric:         Mood and Affect: Mood normal.         Behavior: Behavior normal.         Thought Content: Thought content normal.         Judgment: Judgment normal.              CRANIAL NERVES     CN III, IV, VI   Pupils are equal, round, and reactive to light.       Significant Labs: All pertinent labs within the past 24 hours have been reviewed.  CBC:   Recent Labs   Lab 09/10/24  0626   WBC 8.29   HGB 15.1   HCT 47.3        CMP:   Recent Labs   Lab 09/10/24  0626      K 4.0      CO2 24   GLU 85   BUN 14   CREATININE 0.9   CALCIUM 10.2   ANIONGAP 14       Significant Imaging: I have reviewed all pertinent imaging results/findings within the past 24 hours.  Assessment/Plan:     Primary osteoarthritis of right knee  S/p RIGHT knee manipulation per Dr. Orellana on 9/10 (had TKA 10 weeks prior)  -Pain control  -PT/OT  -ICE   -Aggressive IS  -ASA 81 mg BID for DVT PPx: stay on this until sees Dr. Orellana in clinic.      Hypertension  Patient with elevated BP not maintained on outpatient antihypertensives  -Likely exacerbated by pain  -monitor trend and treat PRN      VTE Risk Mitigation (From admission, onward)           Ordered     IP VTE LOW RISK PATIENT  Once         09/10/24 0714     Place sequential compression device  Until discontinued         09/10/24 0714                                    Mónica Zamora NP  Department of Hospital Medicine  [facility]/Surg

## 2024-09-10 NOTE — PLAN OF CARE
Patient with complaints of pain to right knee 10/10.  Call placed to Dr Ocasio and order received for po medications.

## 2024-09-10 NOTE — PLAN OF CARE
Patient continues to have pain 10/10 to right leg.  Call placed to Dr Ocasio.  No new orders received at this time.

## 2024-09-10 NOTE — ASSESSMENT & PLAN NOTE
S/p RIGHT knee manipulation per Dr. Orellana on 9/10 (had TKA 10 weeks prior)  -Pain control  -PT/OT  -ICE   -Aggressive IS  -ASA 81 mg BID for DVT PPx: stay on this until sees Dr. Orellana in clinic.

## 2024-09-10 NOTE — CARE UPDATE
09/10/24 0638   Patient Assessment/Suction   Level of Consciousness (AVPU) alert   PRE-TX-O2   Device (Oxygen Therapy) room air   SpO2 100 %   Pulse 96   Incentive Spirometer   $ Incentive Spirometer Charges preop instruction   Incentive Spirometer Predicted Level (mL) 1440   Administration (IS) instruction provided, initial;mouthpiece utilized   Number of Repetitions (IS) 2   Level Incentive Spirometer (mL) 2500   Patient Tolerance (IS) good;no adverse signs/symptoms present

## 2024-09-10 NOTE — OP NOTE
[facility]  Orthopedic Surgery  Operative Note    SUMMARY     Date of Procedure: 9/10/2024     Procedure: Procedure(s) (LRB):  MANIPULATION, KNEE (Right)       Surgeons and Role:     * Rod Orellana MD - Primary    Assistant: Romeo HOLDER    Pre-Operative Diagnosis: Knee stiffness, right [M25.661]    Post-Operative Diagnosis: Post-Op Diagnosis Codes:     * Knee stiffness, right [M25.661]    Anesthesia: Conscious sedation    Complications: No    Estimated Blood Loss (EBL): 0           Implants: * No implants in log *    Tourniquet time: 0 at 300mmHg    Specimens:   Specimen (24h ago, onward)      None                    Condition: Good    Disposition: PACU - hemodynamically stable.    Attestation: I was present and scrubbed for the entire procedure.    INDICATIONS FOR THE PROCEDURE: A 69 with a history of right knee replacement who developed a lot of postop stiffness.  Patient failed to progress with physical therapy was consented for manipulation.    PROCEDURE IN DETAIL: Risks, benefits and alternatives of the procedure were   explained to the patient including, but not limited to damage to nerves,   arteries or blood vessels. Also explained risk of infection, fracture, stiffness, DVT, PE,   polyethylene wear as well as anesthetic complications including seizure, stroke,   heart attack and death, understood this and signed informed consent. The   patient's Right knee was marked prior to coming to the Operating Room. The patient was brought to the operating room, placed on the operating table in a supine position.A  formal timeout was done in which correct patient, procedure and op site were all   correctly identified and confirmed by the entire operating team.  Conscious sedation was initiated.  Once the patient was appropriately relax, a gentle manipulation was performed.  Patient's preop range of motion was 0-30 degrees.  After the gentle manipulation and flexion were  audible and palpable release of scar tissue was felt, we are able to flex her up to about 130-140 degrees.    They were awakened and transferred from the Operating Room to the   Recovery Room in stable condition.

## 2024-09-10 NOTE — HPI
Abi Duran is a 70 yo female with history of robotic assisted total knee arthroplasty 10 weeks ago with a postoperative course complicated by significant stiffness with limited ROM despite physical therapy.  Dr. Orellana took her for a RIGHT knee manipulation today.  She had significant pain upon awakening despite block and pain was difficult to control.  Hospital medicine was consulted for admission.  She reports pain better now-- 2/10 in severity- block does appear to be working now.  No chest pain, SOB, N/V, or other complaints acutely.  Other pertinent medical history as below:

## 2024-09-10 NOTE — SUBJECTIVE & OBJECTIVE
Past Medical History:   Diagnosis Date    Allergy     Arthritis     Coronary artery disease     GERD (gastroesophageal reflux disease)     Hypertension     Ulcer        Past Surgical History:   Procedure Laterality Date    CARDIAC SURGERY      angiogram    CHOLECYSTECTOMY      EYE SURGERY      lasik    HYSTERECTOMY      KNEE SURGERY      scope    ROBOTIC ARTHROPLASTY, KNEE Right 6/25/2024    Procedure: ROBOTIC ARTHROPLASTY, KNEE, TOTAL;  Surgeon: Rod Orellana MD;  Location: [facility];  Service: Orthopedics;  Laterality: Right;  NO SPINAL !!// cruzito    TONSILLECTOMY, ADENOIDECTOMY, BILATERAL MYRINGOTOMY AND TUBES      TUBAL LIGATION         Review of patient's allergies indicates:   Allergen Reactions    Oxycodone Swelling     Other reaction(s):swelling of face  Other reaction(s): Flushing (skin)    Amoxicillin Nausea Only     Cannot tolerate side effects    Doxycycline Nausea Only    Erythromycin Nausea Only     Other reaction(s): Nausea    Imitrex [sumatriptan succinate] Swelling and Palpitations     Other reaction(s): swelling  Other reaction(s): chest pains    Phenergan [promethazine] Other (See Comments)     Other reaction(s): Muscle pain  Muscle pain    Sulfa (sulfonamide antibiotics) Nausea And Vomiting       No current facility-administered medications on file prior to encounter.     Current Outpatient Medications on File Prior to Encounter   Medication Sig    BORON ORAL Take by mouth.    ondansetron (ZOFRAN) 8 MG tablet Take 1 tablet (8 mg total) by mouth every 8 (eight) hours as needed for Nausea.    UNABLE TO FIND Take 1 capsule by mouth Daily. medication name:NMN supplement    acetaminophen (TYLENOL) 500 MG tablet Take 2 tablets (1,000 mg total) by mouth every 8 (eight) hours as needed for Pain.     Family History       Problem Relation (Age of Onset)    Blindness Maternal Grandmother, Paternal Grandmother    Breast cancer Maternal Grandmother, Paternal Grandmother    Cataracts Maternal  Grandmother, Paternal Grandmother    Diabetes Paternal Grandmother    Emphysema Father    Heart attack Father, Sister, Maternal Grandmother, Paternal Grandmother    Heart failure Mother    Hypertension Mother    Lung cancer Father    Macular degeneration Mother    Stroke Mother          Tobacco Use    Smoking status: Never    Smokeless tobacco: Never   Substance and Sexual Activity    Alcohol use: No    Drug use: No    Sexual activity: Yes     Partners: Male     Review of Systems   Constitutional:  Negative for chills and fever.   HENT:  Negative for congestion, postnasal drip and trouble swallowing.    Respiratory:  Negative for cough and shortness of breath.    Cardiovascular:  Positive for leg swelling. Negative for chest pain.   Gastrointestinal:  Negative for abdominal pain, constipation, diarrhea and nausea.   Genitourinary:  Negative for difficulty urinating.   Musculoskeletal:  Positive for arthralgias, gait problem and joint swelling.   Neurological:  Negative for dizziness, light-headedness and headaches.   Psychiatric/Behavioral:  Negative for confusion.      Objective:     Vital Signs (Most Recent):  Temp: 98.1 °F (36.7 °C) (09/10/24 1540)  Pulse: 87 (09/10/24 1540)  Resp: 18 (09/10/24 1540)  BP: (!) 174/79 (09/10/24 1540)  SpO2: 98 % (09/10/24 1540) Vital Signs (24h Range):  Temp:  [98.1 °F (36.7 °C)-98.2 °F (36.8 °C)] 98.1 °F (36.7 °C)  Pulse:  [] 87  Resp:  [10-32] 18  SpO2:  [96 %-100 %] 98 %  BP: (172-239)/() 174/79     Weight: 73.4 kg (161 lb 13.1 oz)  Body mass index is 28.66 kg/m².     Physical Exam  Vitals and nursing note reviewed.   Constitutional:       Appearance: Normal appearance.   HENT:      Head: Normocephalic and atraumatic.      Mouth/Throat:      Mouth: Mucous membranes are moist.      Pharynx: Oropharynx is clear.   Eyes:      Extraocular Movements: Extraocular movements intact.      Conjunctiva/sclera: Conjunctivae normal.      Pupils: Pupils are equal, round, and  reactive to light.   Cardiovascular:      Rate and Rhythm: Normal rate and regular rhythm.   Pulmonary:      Effort: Pulmonary effort is normal. No respiratory distress.      Breath sounds: Normal breath sounds.   Abdominal:      General: Abdomen is flat. Bowel sounds are normal. There is no distension.      Palpations: Abdomen is soft.      Tenderness: There is no abdominal tenderness.   Musculoskeletal:      Cervical back: Normal range of motion and neck supple.      Comments: RIGHT knee incision clean, dry.  No erythema, but some warmth  + edema RIGHT knee  2+ pulse   Skin:     General: Skin is warm and dry.      Capillary Refill: Capillary refill takes less than 2 seconds.   Neurological:      General: No focal deficit present.      Mental Status: She is alert and oriented to person, place, and time. Mental status is at baseline.   Psychiatric:         Mood and Affect: Mood normal.         Behavior: Behavior normal.         Thought Content: Thought content normal.         Judgment: Judgment normal.              CRANIAL NERVES     CN III, IV, VI   Pupils are equal, round, and reactive to light.       Significant Labs: All pertinent labs within the past 24 hours have been reviewed.  CBC:   Recent Labs   Lab 09/10/24  0626   WBC 8.29   HGB 15.1   HCT 47.3        CMP:   Recent Labs   Lab 09/10/24  0626      K 4.0      CO2 24   GLU 85   BUN 14   CREATININE 0.9   CALCIUM 10.2   ANIONGAP 14       Significant Imaging: I have reviewed all pertinent imaging results/findings within the past 24 hours.

## 2024-09-10 NOTE — ASSESSMENT & PLAN NOTE
Patient with elevated BP not maintained on outpatient antihypertensives  -Likely exacerbated by pain  -monitor trend and treat PRN

## 2024-09-11 VITALS
HEIGHT: 63 IN | RESPIRATION RATE: 18 BRPM | HEART RATE: 88 BPM | OXYGEN SATURATION: 97 % | TEMPERATURE: 97 F | BODY MASS INDEX: 29.72 KG/M2 | SYSTOLIC BLOOD PRESSURE: 154 MMHG | DIASTOLIC BLOOD PRESSURE: 70 MMHG | WEIGHT: 167.75 LBS

## 2024-09-11 LAB
ALBUMIN SERPL BCP-MCNC: 3.4 G/DL (ref 3.5–5.2)
ALP SERPL-CCNC: 63 U/L (ref 55–135)
ALT SERPL W/O P-5'-P-CCNC: 11 U/L (ref 10–44)
ANION GAP SERPL CALC-SCNC: 9 MMOL/L (ref 8–16)
AST SERPL-CCNC: 15 U/L (ref 10–40)
BASOPHILS # BLD AUTO: 0.02 K/UL (ref 0–0.2)
BASOPHILS NFR BLD: 0.2 % (ref 0–1.9)
BILIRUB SERPL-MCNC: 0.6 MG/DL (ref 0.1–1)
BUN SERPL-MCNC: 15 MG/DL (ref 8–23)
CALCIUM SERPL-MCNC: 8.9 MG/DL (ref 8.7–10.5)
CHLORIDE SERPL-SCNC: 108 MMOL/L (ref 95–110)
CO2 SERPL-SCNC: 25 MMOL/L (ref 23–29)
CREAT SERPL-MCNC: 0.8 MG/DL (ref 0.5–1.4)
DIFFERENTIAL METHOD BLD: ABNORMAL
EOSINOPHIL # BLD AUTO: 0.1 K/UL (ref 0–0.5)
EOSINOPHIL NFR BLD: 1 % (ref 0–8)
ERYTHROCYTE [DISTWIDTH] IN BLOOD BY AUTOMATED COUNT: 13 % (ref 11.5–14.5)
EST. GFR  (NO RACE VARIABLE): >60 ML/MIN/1.73 M^2
GLUCOSE SERPL-MCNC: 109 MG/DL (ref 70–110)
HCT VFR BLD AUTO: 34.9 % (ref 37–48.5)
HCT VFR BLD AUTO: 35.2 % (ref 37–48.5)
HGB BLD-MCNC: 11 G/DL (ref 12–16)
HGB BLD-MCNC: 11 G/DL (ref 12–16)
IMM GRANULOCYTES # BLD AUTO: 0.03 K/UL (ref 0–0.04)
IMM GRANULOCYTES NFR BLD AUTO: 0.3 % (ref 0–0.5)
LYMPHOCYTES # BLD AUTO: 2.1 K/UL (ref 1–4.8)
LYMPHOCYTES NFR BLD: 22.1 % (ref 18–48)
MAGNESIUM SERPL-MCNC: 2.1 MG/DL (ref 1.6–2.6)
MCH RBC QN AUTO: 27.8 PG (ref 27–31)
MCHC RBC AUTO-ENTMCNC: 31.3 G/DL (ref 32–36)
MCV RBC AUTO: 89 FL (ref 82–98)
MONOCYTES # BLD AUTO: 0.8 K/UL (ref 0.3–1)
MONOCYTES NFR BLD: 8.7 % (ref 4–15)
NEUTROPHILS # BLD AUTO: 6.3 K/UL (ref 1.8–7.7)
NEUTROPHILS NFR BLD: 67.7 % (ref 38–73)
NRBC BLD-RTO: 0 /100 WBC
PLATELET # BLD AUTO: 264 K/UL (ref 150–450)
PMV BLD AUTO: 10.5 FL (ref 9.2–12.9)
POTASSIUM SERPL-SCNC: 3.9 MMOL/L (ref 3.5–5.1)
PROT SERPL-MCNC: 5.7 G/DL (ref 6–8.4)
RBC # BLD AUTO: 3.95 M/UL (ref 4–5.4)
SODIUM SERPL-SCNC: 142 MMOL/L (ref 136–145)
WBC # BLD AUTO: 9.32 K/UL (ref 3.9–12.7)

## 2024-09-11 PROCEDURE — 85014 HEMATOCRIT: CPT | Mod: 91 | Performed by: NURSE PRACTITIONER

## 2024-09-11 PROCEDURE — 80053 COMPREHEN METABOLIC PANEL: CPT | Performed by: NURSE PRACTITIONER

## 2024-09-11 PROCEDURE — 94761 N-INVAS EAR/PLS OXIMETRY MLT: CPT

## 2024-09-11 PROCEDURE — 85025 COMPLETE CBC W/AUTO DIFF WBC: CPT | Performed by: NURSE PRACTITIONER

## 2024-09-11 PROCEDURE — 85018 HEMOGLOBIN: CPT | Performed by: NURSE PRACTITIONER

## 2024-09-11 PROCEDURE — 36415 COLL VENOUS BLD VENIPUNCTURE: CPT | Performed by: NURSE PRACTITIONER

## 2024-09-11 PROCEDURE — 94799 UNLISTED PULMONARY SVC/PX: CPT

## 2024-09-11 PROCEDURE — 25500020 PHARM REV CODE 255

## 2024-09-11 PROCEDURE — 63600175 PHARM REV CODE 636 W HCPCS: Performed by: NURSE PRACTITIONER

## 2024-09-11 PROCEDURE — 25000003 PHARM REV CODE 250: Performed by: ORTHOPAEDIC SURGERY

## 2024-09-11 PROCEDURE — 83735 ASSAY OF MAGNESIUM: CPT | Performed by: NURSE PRACTITIONER

## 2024-09-11 PROCEDURE — 25000003 PHARM REV CODE 250: Performed by: NURSE PRACTITIONER

## 2024-09-11 PROCEDURE — 97161 PT EVAL LOW COMPLEX 20 MIN: CPT

## 2024-09-11 RX ORDER — ONDANSETRON HYDROCHLORIDE 4 MG/5ML
8 SOLUTION ORAL EVERY 8 HOURS PRN
Status: DISCONTINUED | OUTPATIENT
Start: 2024-09-11 | End: 2024-09-11 | Stop reason: HOSPADM

## 2024-09-11 RX ORDER — HYDROCODONE BITARTRATE AND ACETAMINOPHEN 10; 325 MG/1; MG/1
1 TABLET ORAL EVERY 4 HOURS PRN
Qty: 42 TABLET | Refills: 0 | Status: SHIPPED | OUTPATIENT
Start: 2024-09-11

## 2024-09-11 RX ORDER — ONDANSETRON 4 MG/1
8 TABLET, FILM COATED ORAL EVERY 6 HOURS PRN
Qty: 30 TABLET | Refills: 0 | Status: SHIPPED | OUTPATIENT
Start: 2024-09-11

## 2024-09-11 RX ORDER — GABAPENTIN 300 MG/1
300 CAPSULE ORAL 3 TIMES DAILY
Qty: 90 CAPSULE | Refills: 1 | Status: SHIPPED | OUTPATIENT
Start: 2024-09-11 | End: 2025-09-11

## 2024-09-11 RX ADMIN — ASPIRIN 81 MG CHEWABLE TABLET 81 MG: 81 TABLET CHEWABLE at 08:09

## 2024-09-11 RX ADMIN — DOCUSATE SODIUM 100 MG: 100 CAPSULE, LIQUID FILLED ORAL at 08:09

## 2024-09-11 RX ADMIN — HYDROCODONE BITARTRATE AND ACETAMINOPHEN 1 TABLET: 10; 325 TABLET ORAL at 08:09

## 2024-09-11 RX ADMIN — ONDANSETRON HYDROCHLORIDE 8 MG: 4 SOLUTION ORAL at 01:09

## 2024-09-11 RX ADMIN — DOCUSATE SODIUM AND SENNOSIDES 1 TABLET: 8.6; 5 TABLET, FILM COATED ORAL at 08:09

## 2024-09-11 RX ADMIN — ONDANSETRON 4 MG: 2 INJECTION INTRAMUSCULAR; INTRAVENOUS at 09:09

## 2024-09-11 RX ADMIN — HYDROCODONE BITARTRATE AND ACETAMINOPHEN 1 TABLET: 10; 325 TABLET ORAL at 03:09

## 2024-09-11 RX ADMIN — IOHEXOL 75 ML: 350 INJECTION, SOLUTION INTRAVENOUS at 09:09

## 2024-09-11 RX ADMIN — HYDROCODONE BITARTRATE AND ACETAMINOPHEN 1 TABLET: 10; 325 TABLET ORAL at 12:09

## 2024-09-11 NOTE — ASSESSMENT & PLAN NOTE
Patient with elevated BP not maintained on outpatient antihypertensives  -Likely exacerbated by pain  -monitor trend and treat PRN   -- Stable

## 2024-09-11 NOTE — CARE UPDATE
09/10/24 2025   Patient Assessment/Suction   Level of Consciousness (AVPU) alert   Respiratory Effort Normal;Unlabored   Expansion/Accessory Muscles/Retractions expansion symmetric;no retractions;no use of accessory muscles   PRE-TX-O2   Device (Oxygen Therapy) room air   SpO2 98 %   Pulse Oximetry Type Intermittent   Incentive Spirometer   $ Incentive Spirometer Charges done with encouragement   Administration (IS) proper technique demonstrated   Number of Repetitions (IS) 10   Level Incentive Spirometer (mL) 2500   Patient Tolerance (IS) good

## 2024-09-11 NOTE — PLAN OF CARE
Problem: Physical Therapy  Goal: Physical Therapy Goal  Description: Goals to be met by: 24     Patient will increase functional independence with mobility by performin. Supine to sit with Cros[place]  2. Sit to supine with Cros[place]  3. Sit to stand transfer with Stand-by Assistance  4. Bed to chair transfer with Stand-by Assistance using Rolling Walker  5. Gait  x 250 feet with Stand-by Assistance using Rolling Walker.     Outcome: Progressing

## 2024-09-11 NOTE — PLAN OF CARE
Pt declines  services. Would like to continue OP therapy at [facility] in [place]        09/11/24 1116   Post-Acute Status   Post-Acute Authorization Home Health   Home Health Status Patient declined/refused

## 2024-09-11 NOTE — PLAN OF CARE
Problem: Adult Inpatient Plan of Care  Goal: Plan of Care Review  Outcome: Met  Goal: Patient-Specific Goal (Individualized)  Outcome: Met  Goal: Absence of Hospital-Acquired Illness or Injury  Outcome: Met  Goal: Optimal Comfort and Wellbeing  Outcome: Met  Goal: Readiness for Transition of Care  Outcome: Met     Problem: Wound  Goal: Optimal Coping  Outcome: Met  Goal: Optimal Functional Ability  Outcome: Met  Goal: Absence of Infection Signs and Symptoms  Outcome: Met  Goal: Improved Oral Intake  Outcome: Met  Goal: Optimal Pain Control and Function  Outcome: Met  Goal: Skin Health and Integrity  Outcome: Met  Goal: Optimal Wound Healing  Outcome: Met   Discharge instructions given to patient. Patient verbalized complete understanding. IV discontinued with catheter intact, pressure applied to site and secured with tape. Patient tolerated well.

## 2024-09-11 NOTE — HOSPITAL COURSE
Abi Duran is a 70 yo female with history of robotic assisted total knee arthroplasty 10 weeks ago with a postoperative course complicated by significant stiffness with limited ROM despite physical therapy. Dr. Orellana took her for a RIGHT knee manipulation 9/10. She had significant pain upon awakening despite block and pain was difficult to control so she was kept overnight for medical management. Patient assessed on day of discharge and stable for discharge. Patient still c/o knee stiffness and did have a drop in H/H so CT ordered which showed some evidence of bleeding but no active extravasation. Discussed findings with Dr. Orellana with clearance for discharge and recommendations to f/u in clinic and continue with PT as soon as possible to prevent stiffness. Repeat H/H stable and no signs of worsening swelling/hematoma of knee. Patient educated on red flags of when to return to ED. Plan discussed with patient and she was agreeable to plan and discharge.

## 2024-09-11 NOTE — ASSESSMENT & PLAN NOTE
S/p RIGHT knee manipulation per Dr. Orellana on 9/10 (had TKA 10 weeks prior)  -Pain control  -PT/OT  -Elevation  -ICE   -Aggressive IS  -ASA 81 mg BID for DVT PPx: stay on this until sees Dr. Orellana in clinic.

## 2024-09-11 NOTE — PLAN OF CARE
[facility] - Med/Surg  Initial Discharge Assessment       Primary Care Provider: Andrew Perales Jr., MD    Admission Diagnosis: Knee stiffness, right [M25.661]  Knee stiff, right [M25.661]    Admission Date: 9/10/2024  Expected Discharge Date: 9/12/2024    Transition of Care Barriers: None    Payor: MEDICARE / Plan: MEDICARE PART A & B / Product Type: Government /     Extended Emergency Contact Information  Primary Emergency Contact: LeenaSae jiang  Address: [address] [place] [address] [facility]  Home Phone: [phone]  Mobile Phone: [phone]  Relation: Spouse    Discharge Plan A: Home with family        [facility]  [address]  [facility] [address]  Phone: [phone] Fax: [phone]    [id number] - [place], [place] - [address]  [address]  [place] [address]  Phone: [phone] Fax: [phone]        DC assessment completed with patient and spouse at bedside. Verified information on facesheet as correct. Denies POA. Lives at listed address with spouse. PCP is Dr. Perales, reports last apt was around June. Pharmacy is normally is [facility] in [facility]. Denies hh/hd/blood thinners. Does OP PT/OT at [facility] in [place] 3x week. DME- RW, cane, bp monitor, sc, and bsc. Modified independent at baseline. Drives herself to apts. Spouse to provide transportation home upon DC. Reports taking home medications as prescribed and can currently afford them. Has prescription coverage through [facility]. Denies recent inpt stay in last 30 days. Verified insurance on file. DC plan is home with continued OP pt/ot.     Initial Assessment (most recent)       Adult Discharge Assessment - 09/11/24 1058          Discharge Assessment    Assessment Type Discharge Planning Assessment     Confirmed/corrected address, phone number and insurance Yes     Confirmed Demographics Correct on Facesheet     Source of Information patient      Reason For Admission osteo     People in Home spouse     Facility Arrived From: home     Do you expect to return to your current living situation? Yes     Do you have help at home or someone to help you manage your care at home? Yes     Prior to hospitilization cognitive status: Alert/Oriented     Current cognitive status: Alert/Oriented     Walking or Climbing Stairs Difficulty yes     Walking or Climbing Stairs ambulation difficulty, requires equipment     Dressing/Bathing Difficulty yes     Dressing/Bathing bathing difficulty, requires equipment     Equipment Currently Used at Home walker, rolling;cane, straight;blood pressure machine;shower chair;crutches     Readmission within 30 days? No     Patient currently being followed by outpatient case management? No     Do you currently have service(s) that help you manage your care at home? No     Do you take prescription medications? Yes     Do you have prescription coverage? Yes     Coverage BCBS Federal     Do you have any problems affording any of your prescribed medications? No     Is the patient taking medications as prescribed? yes     Who is going to help you get home at discharge? spouse     How do you get to doctors appointments? family or friend will provide;car, drives self     Are you on dialysis? No     Do you take coumadin? No     Discharge Plan A Home with family     Discharge Plan B Home     DME Needed Upon Discharge  none     Discharge Plan discussed with: Patient;Spouse/sig other     Transition of Care Barriers None        Physical Activity    On average, how many days per week do you engage in moderate to strenuous exercise (like a brisk walk)? 3 days     On average, how many minutes do you engage in exercise at this level? 60 min        Financial Resource Strain    How hard is it for you to pay for the very basics like food, housing, medical care, and heating? Not hard at all        Housing Stability    In the last 12 months, was there a time  when you were not able to pay the mortgage or rent on time? No     At any time in the past 12 months, were you homeless or living in a shelter (including now)? No        Transportation Needs    Has the lack of transportation kept you from medical appointments, meetings, work or from getting things needed for daily living? No        Food Insecurity    Within the past 12 months, you worried that your food would run out before you got the money to buy more. Never true     Within the past 12 months, the food you bought just didn't last and you didn't have money to get more. Never true        Stress    Do you feel stress - tense, restless, nervous, or anxious, or unable to sleep at night because your mind is troubled all the time - these days? Not at all        Social Isolation    How often do you feel lonely or isolated from those around you?  Never        Alcohol Use    Q1: How often do you have a drink containing alcohol? Never     Q2: How many drinks containing alcohol do you have on a typical day when you are drinking? Patient does not drink     Q3: How often do you have six or more drinks on one occasion? Never        [employer]    In the past 12 months has the electric, gas, oil, or water company threatened to shut off services in your home? No        Health Literacy    How often do you need to have someone help you when you read instructions, pamphlets, or other written material from your doctor or pharmacy? Never

## 2024-09-11 NOTE — PLAN OF CARE
Problem: Adult Inpatient Plan of Care  Goal: Plan of Care Review  Outcome: Progressing  Goal: Patient-Specific Goal (Individualized)  Outcome: Progressing  Goal: Absence of Hospital-Acquired Illness or Injury  Outcome: Progressing  Goal: Optimal Comfort and Wellbeing  Outcome: Progressing  Goal: Readiness for Transition of Care  Outcome: Progressing     Problem: Wound  Goal: Optimal Coping  Outcome: Progressing  Goal: Optimal Functional Ability  Outcome: Progressing  Goal: Absence of Infection Signs and Symptoms  Outcome: Progressing  Goal: Improved Oral Intake  Outcome: Progressing  Goal: Optimal Pain Control and Function  Outcome: Progressing  Goal: Skin Health and Integrity  Outcome: Progressing  Goal: Optimal Wound Healing  Outcome: Progressing   Plan of care reviewed with patient. Patient verbalized understanding. All fall precautions maintained. Bed in lowest position, call light within reach, slip resistant socks maintained. Bed alarm on. Ambulating with walker to bathroom. Ice pack in place to right knee

## 2024-09-11 NOTE — DISCHARGE SUMMARY
[facility] Medicine  Discharge Summary      Patient Name: Abi Duran  MRN: [id number]  [facility]: [id number]  Patient Class: OP- Outpatient Recovery  Admission Date: 9/10/2024  Hospital Length of Stay: 0 days  Discharge Date and Time: 9/11/2024  1:47 PM  Attending Physician: No att. providers found   Discharging Provider: Selina Atkinson NP  Primary Care Provider: Andrew Perales Jr., MD    Primary Care Team: Networked reference to record PCT     HPI:   Abi Duran is a 68 yo female with history of robotic assisted total knee arthroplasty 10 weeks ago with a postoperative course complicated by significant stiffness with limited ROM despite physical therapy.  Dr. Orellana took her for a RIGHT knee manipulation today.  She had significant pain upon awakening despite block and pain was difficult to control.  Hospital medicine was consulted for admission.  She reports pain better now-- 2/10 in severity- block does appear to be working now.  No chest pain, SOB, N/V, or other complaints acutely.  Other pertinent medical history as below:    Procedure(s) (LRB):  MANIPULATION, KNEE (Right)      Hospital Course:   Abi Duran is a 68 yo female with history of robotic assisted total knee arthroplasty 10 weeks ago with a postoperative course complicated by significant stiffness with limited ROM despite physical therapy. Dr. Orellana took her for a RIGHT knee manipulation 9/10. She had significant pain upon awakening despite block and pain was difficult to control so she was kept overnight for medical management. Patient assessed on day of discharge and stable for discharge. Patient still c/o knee stiffness and did have a drop in H/H so CT ordered which showed some evidence of bleeding but no active extravasation. Discussed findings with Dr. Orellana with clearance for discharge and recommendations to f/u in clinic and continue with PT as soon as possible to prevent stiffness. Repeat H/H  stable and no signs of worsening swelling/hematoma of knee. Patient educated on red flags of when to return to ED. Plan discussed with patient and she was agreeable to plan and discharge.     Goals of Care Treatment Preferences:  Code Status: Full Code      SDOH Screening:  The patient was screened for utility difficulties, food insecurity, transport difficulties, housing insecurity, and interpersonal safety and there were no concerns identified this admission.     Consults:   Consults (From admission, onward)          Status Ordering Provider     Inpatient consult to Hospitalist  Once        Provider:  Mónica Zamora NP    Acknowledged AZEB PASTOR            Cardiac/Vascular  Hypertension  Patient with elevated BP not maintained on outpatient antihypertensives  -Likely exacerbated by pain  -monitor trend and treat PRN   -- Stable    Orthopedic  * Primary osteoarthritis of right knee  S/p RIGHT knee manipulation per Dr. Pastor on 9/10 (had TKA 10 weeks prior)  -Pain control  -PT/OT  -Elevation  -ICE   -Aggressive IS  -ASA 81 mg BID for DVT PPx: stay on this until sees Dr. Pastor in clinic.        Final Active Diagnoses:    Diagnosis Date Noted POA    PRINCIPAL PROBLEM:  Primary osteoarthritis of right knee [M17.11] 06/25/2024 Yes    Hypertension [I10] 06/25/2024 Yes      Problems Resolved During this Admission:       Discharged Condition: stable    Disposition: Home or Self Care    Follow Up:   Follow-up Information       Azeb Pastor MD. Go on 9/26/2024.    Specialties: Sports Medicine, Orthopedic Surgery  Why: @ 9:45 AM for post op visit  Contact information:  [address] [place] [address] [address]  [facility] [address]  [phone]                           Patient Instructions:      Diet general     Call MD for:  temperature >100.4     Call MD for:  persistent nausea and vomiting     Call MD for:  severe uncontrolled pain     Call MD for:  difficulty breathing, headache or visual disturbances      Call MD for:  redness, tenderness, or signs of infection (pain, swelling, redness, odor or green/yellow discharge around incision site)     Call MD for:  hives     Call MD for:  persistent dizziness or light-headedness     Call MD for:  extreme fatigue     Weight bearing as tolerated       Significant Diagnostic Studies: Labs: CMP   Recent Labs   Lab 09/10/24  0626 09/11/24  0434    142   K 4.0 3.9    108   CO2 24 25   GLU 85 109   BUN 14 15   CREATININE 0.9 0.8   CALCIUM 10.2 8.9   PROT  --  5.7*   ALBUMIN  --  3.4*   BILITOT  --  0.6   ALKPHOS  --  63   AST  --  15   ALT  --  11   ANIONGAP 14 9    and CBC   Recent Labs   Lab 09/10/24  0626 09/11/24  0434 09/11/24  0959   WBC 8.29 9.32  --    HGB 15.1 11.0* 11.0*   HCT 47.3 35.2* 34.9*    264  --      Radiology: CT scan: 1. Right knee arthroplasty without acute osseous pathology allowing for exam limitation.  2. Moderate size complex knee joint effusion which could contain a degree of hemorrhage.  3. No evidence for active extravasation.  4. Several nonspecific nodular densities along the edge of the suprapatellar recess.  Synovitis, scar tissue, and focal areas of hemorrhage are some considerations.    Pending Diagnostic Studies:       None           Medications:  Reconciled Home Medications:      Medication List        START taking these medications      gabapentin 300 MG capsule  Commonly known as: NEURONTIN  Take 1 capsule (300 mg total) by mouth 3 (three) times daily.     HYDROcodone-acetaminophen  mg per tablet  Commonly known as: NORCO  Take 1 tablet by mouth every 4 (four) hours as needed for Pain.            CHANGE how you take these medications      * ondansetron 8 MG tablet  Commonly known as: ZOFRAN  Take 1 tablet (8 mg total) by mouth every 8 (eight) hours as needed for Nausea.  What changed: Another medication with the same name was added. Make sure you understand how and when to take each.     * ondansetron 4 MG  tablet  Commonly known as: ZOFRAN  Take 2 tablets (8 mg total) by mouth every 6 (six) hours as needed for Nausea.  What changed: You were already taking a medication with the same name, and this prescription was added. Make sure you understand how and when to take each.           * This list has 2 medication(s) that are the same as other medications prescribed for you. Read the directions carefully, and ask your doctor or other care provider to review them with you.                CONTINUE taking these medications      acetaminophen 500 MG tablet  Commonly known as: TYLENOL  Take 2 tablets (1,000 mg total) by mouth every 8 (eight) hours as needed for Pain.     BORON ORAL  Take by mouth.     UNABLE TO FIND  Take 1 capsule by mouth Daily. medication name:NMN supplement              Indwelling Lines/Drains at time of discharge:   Lines/Drains/Airways       None                   Time spent on the discharge of patient: 25 minutes         Selina Atkinson NP  Department of Hospital Medicine  [facility]/Surg

## 2024-09-11 NOTE — PLAN OF CARE
Pt clear for DC from case management standpoint. Discharging to home       09/11/24 1304   Final Note   Assessment Type Final Discharge Note   Anticipated Discharge Disposition Home

## 2024-09-11 NOTE — PT/OT/SLP EVAL
Physical Therapy Evaluation    Patient Name:  Abi Duran   MRN:  [id number]    Recommendations:     Discharge Recommendations: Low Intensity Therapy   Discharge Equipment Recommendations: none   Barriers to discharge: None    Assessment:     Abi Duran is a 69 y.o. female admitted with a medical diagnosis of Primary osteoarthritis of right knee.  She presents with the following impairments/functional limitations: weakness, impaired endurance, impaired functional mobility, gait instability, impaired balance, pain, decreased ROM, edema .  Patient agreeable to PT evaluation this morning.  Patient presented supine in bed and was able to transfer to sitting with sBA and then stood with CGA and a RW.  Patient then ambulated x 150 RW CGA.    Rehab Prognosis: Good; patient would benefit from acute skilled PT services to address these deficits and reach maximum level of function.    Recent Surgery: Procedure(s) (LRB):  MANIPULATION, KNEE (Right) 1 Day Post-Op    Plan:     During this hospitalization, patient to be seen daily to address the identified rehab impairments via gait training, therapeutic activities, therapeutic exercises and progress toward the following goals:    Plan of Care Expires:  10/14/24    Subjective     Chief Complaint: pain  Patient/Family Comments/goals: not hurt so badly  Pain/Comfort:  Pain Rating 1: 10/10  Location - Side 1: Right  Location - Orientation 1: lower  Location 1: knee  Pain Addressed 1: Reposition, Cessation of Activity  Pain Rating Post-Intervention 1: 10/10    Patients cultural, spiritual, Cheondoism conflicts given the current situation:      Living Environment:  Currently lives with spouse in [address] home.  Prior to admission, patients level of function was modified independent.  Equipment used at home: walker, rolling, wheelchair.  DME owned (not currently used): none.  Upon discharge, patient will have assistance from spouse.    Objective:     Communicated with nurse  prior to session.  Patient found supine with bed alarm  upon PT entry to room.    General Precautions: Standard, fall  Orthopedic Precautions:RLE weight bearing as tolerated   Braces:    Respiratory Status: Room air    Exams:  RLE ROM: knee extension -40 degrees, flexion 80 degrees both take in sitting  RLE Strength: Deficits: 3-/5 overall  LLE ROM: WFL  LLE Strength: WNL    Functional Mobility:  Bed Mobility:     Supine to Sit: stand by assistance  Sit to Supine: stand by assistance  Transfers:     Sit to Stand:  contact guard assistance with rolling walker  Gait: x 150 feet rW CGA      AM-PAC 6 CLICK MOBILITY  Total Score:20       Treatment & Education:  None given    Patient left supine with call button in reach, bed alarm on, and nurse notified.    GOALS:   Multidisciplinary Problems       Physical Therapy Goals          Problem: Physical Therapy    Goal Priority Disciplines Outcome Goal Variances Interventions   Physical Therapy Goal     PT, PT/OT Progressing     Description: Goals to be met by: 24     Patient will increase functional independence with mobility by performin. Supine to sit with [place]  2. Sit to supine with [place]  3. Sit to stand transfer with Stand-by Assistance  4. Bed to chair transfer with Stand-by Assistance using Rolling Walker  5. Gait  x 250 feet with Stand-by Assistance using Rolling Walker.                          History:     Past Medical History:   Diagnosis Date    Allergy     Arthritis     Coronary artery disease     GERD (gastroesophageal reflux disease)     Hypertension     Ulcer        Past Surgical History:   Procedure Laterality Date    CARDIAC SURGERY      angiogram    CHOLECYSTECTOMY      EYE SURGERY      lasik    HYSTERECTOMY      KNEE JOINT MANIPULATION Right 9/10/2024    Procedure: MANIPULATION, KNEE;  Surgeon: Rod Orellana MD;  Location: [facility];  Service: Orthopedics;  Laterality: Right;    KNEE SURGERY      scope    ROBOTIC  ARTHROPLASTY, KNEE Right 6/25/2024    Procedure: ROBOTIC ARTHROPLASTY, KNEE, TOTAL;  Surgeon: Rod Orellana MD;  Location: [facility];  Service: Orthopedics;  Laterality: Right;  NO SPINAL !!// cruzito    TONSILLECTOMY, ADENOIDECTOMY, BILATERAL MYRINGOTOMY AND TUBES      TUBAL LIGATION         Time Tracking:     PT Received On: 09/11/24  PT Start Time: 0707     PT Stop Time: 0722  PT Total Time (min): 15 min     Billable Minutes: Evaluation 15      09/11/2024

## 2024-09-12 ENCOUNTER — TELEPHONE (OUTPATIENT)
Dept: ORTHOPEDICS | Facility: CLINIC | Age: 69
End: 2024-09-12
Payer: MEDICARE

## 2024-09-12 NOTE — TELEPHONE ENCOUNTER
----- Message from Judit Oliveros sent at 9/12/2024  1:56 PM CDT -----  Regarding: advise  Contact: [facility]  Type: Needs Medical Advice  Who Called:  [facility]   Symptoms (please be specific):    How long has patient had these symptoms:    Pharmacy name and phone #:    Best Call Back Number: [phone]  Additional Information: you sent over a referral regarding the following clinic : they understand 5 x week for physical therapy they need a sign order or referral stating that in order for pt to be seen that many times. And they also sent over a plan of care that needs your signature .

## 2024-09-12 NOTE — TELEPHONE ENCOUNTER
Called and spoke to PT.  Faxed referral again with instructions for 5 days per week.  Gave her our fax so she can send updated plan of care for us to sign.     Asa

## 2024-09-13 ENCOUNTER — TELEPHONE (OUTPATIENT)
Dept: ORTHOPEDICS | Facility: CLINIC | Age: 69
End: 2024-09-13
Payer: MEDICARE

## 2024-09-13 ENCOUNTER — PATIENT MESSAGE (OUTPATIENT)
Dept: ORTHOPEDICS | Facility: CLINIC | Age: 69
End: 2024-09-13
Payer: MEDICARE

## 2024-09-13 NOTE — TELEPHONE ENCOUNTER
----- Message from Jenny Mendez sent at 9/13/2024  8:14 AM CDT -----  Type: Needs Medical Advice  Who Called:  [facility]   Pharmacy name and phone #:    Best Call Back Number: [phone]  Additional Information: is calling the office asking if Dr Orellana  would like them to implement a [place] Splint and what are the future plans, please call back to advise, thanks . If  has any protocol for right knee manipulation please call back

## 2024-09-13 NOTE — NURSING
Nurses Note -- 4 Eyes      9/10/2024  3:40 PM      Skin assessed during: Admit      [x] No Altered Skin Integrity Present    [x]Prevention Measures Documented      [] Yes- Altered Skin Integrity Present or Discovered   [] LDA Added if Not in Epic (Describe Wound)   [] New Altered Skin Integrity was Present on Admit and Documented in LDA   [] Wound Image Taken    Wound Care Consulted? No    Attending Nurse:  Nigel Mcclellan RN/Staff Member:  [place]

## 2024-09-16 ENCOUNTER — PATIENT MESSAGE (OUTPATIENT)
Dept: ORTHOPEDICS | Facility: CLINIC | Age: 69
End: 2024-09-16
Payer: MEDICARE

## 2024-09-16 DIAGNOSIS — Z96.651 S/P TOTAL KNEE REPLACEMENT, RIGHT: Primary | ICD-10-CM

## 2024-09-16 RX ORDER — HYDROMORPHONE HYDROCHLORIDE 4 MG/1
4 TABLET ORAL EVERY 6 HOURS PRN
Qty: 28 TABLET | Refills: 0 | Status: SHIPPED | OUTPATIENT
Start: 2024-09-16 | End: 2024-09-17 | Stop reason: SDUPTHER

## 2024-09-17 RX ORDER — HYDROMORPHONE HYDROCHLORIDE 4 MG/1
4 TABLET ORAL EVERY 6 HOURS PRN
Qty: 28 TABLET | Refills: 0 | Status: SHIPPED | OUTPATIENT
Start: 2024-09-17

## 2024-09-18 RX ORDER — METHYLPREDNISOLONE 4 MG/1
TABLET ORAL
Qty: 21 EACH | Refills: 0 | Status: SHIPPED | OUTPATIENT
Start: 2024-09-18

## 2024-09-18 RX ORDER — OXYCODONE AND ACETAMINOPHEN 5; 325 MG/1; MG/1
1 TABLET ORAL EVERY 6 HOURS PRN
Qty: 28 TABLET | Refills: 0 | Status: SHIPPED | OUTPATIENT
Start: 2024-09-18

## 2024-09-19 DIAGNOSIS — R11.0 NAUSEA: ICD-10-CM

## 2024-09-19 RX ORDER — ONDANSETRON HYDROCHLORIDE 8 MG/1
8 TABLET, FILM COATED ORAL EVERY 8 HOURS PRN
Qty: 40 TABLET | Refills: 0 | Status: SHIPPED | OUTPATIENT
Start: 2024-09-19

## 2024-09-26 ENCOUNTER — OFFICE VISIT (OUTPATIENT)
Dept: ORTHOPEDICS | Facility: CLINIC | Age: 69
End: 2024-09-26
Payer: MEDICARE

## 2024-09-26 VITALS — WEIGHT: 167.75 LBS | HEIGHT: 63 IN | BODY MASS INDEX: 29.72 KG/M2

## 2024-09-26 DIAGNOSIS — M25.661 KNEE STIFFNESS, RIGHT: ICD-10-CM

## 2024-09-26 DIAGNOSIS — Z96.651 S/P TOTAL KNEE REPLACEMENT, RIGHT: Primary | ICD-10-CM

## 2024-09-26 PROCEDURE — 99212 OFFICE O/P EST SF 10 MIN: CPT | Mod: PBBFAC,PO | Performed by: ORTHOPAEDIC SURGERY

## 2024-09-26 PROCEDURE — 99214 OFFICE O/P EST MOD 30 MIN: CPT | Mod: S$PBB,,, | Performed by: ORTHOPAEDIC SURGERY

## 2024-09-26 PROCEDURE — 99999 PR PBB SHADOW E&M-EST. PATIENT-LVL II: CPT | Mod: PBBFAC,,, | Performed by: ORTHOPAEDIC SURGERY

## 2024-09-26 RX ORDER — HYDROCODONE BITARTRATE AND ACETAMINOPHEN 10; 325 MG/1; MG/1
1 TABLET ORAL EVERY 4 HOURS PRN
Qty: 42 TABLET | Refills: 0 | Status: SHIPPED | OUTPATIENT
Start: 2024-09-26

## 2024-09-26 NOTE — PROGRESS NOTES
Past Medical History:   Diagnosis Date    Allergy     Arthritis     Coronary artery disease     GERD (gastroesophageal reflux disease)     Hypertension     Ulcer        Past Surgical History:   Procedure Laterality Date    CARDIAC SURGERY      angiogram    CHOLECYSTECTOMY      EYE SURGERY      lasik    HYSTERECTOMY      KNEE JOINT MANIPULATION Right 9/10/2024    Procedure: MANIPULATION, KNEE;  Surgeon: Rod Orellana MD;  Location: [facility];  Service: Orthopedics;  Laterality: Right;    KNEE SURGERY      scope    ROBOTIC ARTHROPLASTY, KNEE Right 6/25/2024    Procedure: ROBOTIC ARTHROPLASTY, KNEE, TOTAL;  Surgeon: Rod Orellana MD;  Location: [facility];  Service: Orthopedics;  Laterality: Right;  NO SPINAL !!// cruzito    TONSILLECTOMY, ADENOIDECTOMY, BILATERAL MYRINGOTOMY AND TUBES      TUBAL LIGATION         Current Outpatient Medications   Medication Sig    acetaminophen (TYLENOL) 500 MG tablet Take 2 tablets (1,000 mg total) by mouth every 8 (eight) hours as needed for Pain.    BORON ORAL Take by mouth.    gabapentin (NEURONTIN) 300 MG capsule Take 1 capsule (300 mg total) by mouth 3 (three) times daily.    HYDROcodone-acetaminophen (NORCO)  mg per tablet Take 1 tablet by mouth every 4 (four) hours as needed for Pain.    HYDROmorphone (DILAUDID) 4 MG tablet Take 1 tablet (4 mg total) by mouth every 6 (six) hours as needed for Pain.    methylPREDNISolone (MEDROL DOSEPACK) 4 mg tablet use as directed    ondansetron (ZOFRAN) 4 MG tablet Take 2 tablets (8 mg total) by mouth every 6 (six) hours as needed for Nausea.    ondansetron (ZOFRAN) 8 MG tablet TAKE 1 TABLET BY MOUTH EVERY 8 HOURS AS NEEDED FOR NAUSEA    oxyCODONE-acetaminophen (PERCOCET)  mg per tablet Take 1 tablet by mouth every 6 (six) hours as needed for Pain.    oxyCODONE-acetaminophen (PERCOCET) 5-325 mg per tablet Take 1 tablet by mouth every 6 (six) hours as needed for Pain.    UNABLE TO FIND Take 1 capsule by mouth Daily.  medication name:NMN supplement     No current facility-administered medications for this visit.       Review of patient's allergies indicates:   Allergen Reactions    Oxycodone Swelling     Other reaction(s):swelling of face  Other reaction(s): Flushing (skin)    Amoxicillin Nausea Only     Cannot tolerate side effects    Doxycycline Nausea Only    Erythromycin Nausea Only     Other reaction(s): Nausea    Imitrex [sumatriptan succinate] Swelling and Palpitations     Other reaction(s): swelling  Other reaction(s): chest pains    Phenergan [promethazine] Other (See Comments)     Other reaction(s): Muscle pain  Muscle pain    Sulfa (sulfonamide antibiotics) Nausea And Vomiting       Family History   Problem Relation Name Age of Onset    Heart failure Mother      Stroke Mother      Macular degeneration Mother      Hypertension Mother      Heart attack Father      Emphysema Father      Lung cancer Father      Heart attack Sister      Breast cancer Maternal Grandmother      Heart attack Maternal Grandmother      Blindness Maternal Grandmother      Cataracts Maternal Grandmother      Breast cancer Paternal Grandmother      Heart attack Paternal Grandmother      Blindness Paternal Grandmother      Cataracts Paternal Grandmother      Diabetes Paternal Grandmother      Amblyopia Neg Hx      Cancer Neg Hx      Glaucoma Neg Hx      Retinal detachment Neg Hx      Strabismus Neg Hx      Thyroid disease Neg Hx         Social History     Socioeconomic History    Marital status:    Tobacco Use    Smoking status: Never    Smokeless tobacco: Never   Substance and Sexual Activity    Alcohol use: No    Drug use: No    Sexual activity: Yes     Partners: Male     Social Determinants of Health     Financial Resource Strain: Low Risk  (9/11/2024)    Overall Financial Resource Strain (CARDIA)     Difficulty of Paying Living Expenses: Not hard at all   Food Insecurity: No Food Insecurity (9/11/2024)    Hunger Vital Sign     Worried  About Running Out of Food in the Last Year: Never true     Ran Out of Food in the Last Year: Never true   Transportation Needs: No Transportation Needs (9/11/2024)    TRANSPORTATION NEEDS     Transportation : No   Physical Activity: Sufficiently Active (9/11/2024)    Exercise Vital Sign     Days of Exercise per Week: 3 days     Minutes of Exercise per Session: 60 min   Stress: No Stress Concern Present (9/11/2024)    [employer] [place] [employer] - Occupational Stress Questionnaire     Feeling of Stress : Not at all   Housing Stability: Low Risk  (9/11/2024)    Housing Stability Vital Sign     Unable to Pay for Housing in the Last Year: No     Homeless in the Last Year: No       Chief Complaint:   Chief Complaint   Patient presents with    Post-op Evaluation     R knee manipulation- 2 wk post op        Date of surgery:  June 25, 2024 right total knee   September 10, 2024 right knee manipulation    History of present illness:  69-year-old female underwent right robotic assisted total knee arthroplasty.  Patient is doing a little bit better.  She is able to get up to 85° and physical therapy.  It took a lot of trial and error to try and find a medication regimen that would help with her pain.  She is currently on Percocet 10.  We tried Dilaudid in it did not work at all.  Patient also got her Carolina splint yesterday.  Pain is currently a 2/10.  She is taking Norco during the day and just Percocet for physical therapy.    Review of Systems:    Musculoskeletal:  See HPI        Physical Examination:    Vital Signs:  There were no vitals filed for this visit.    Body mass index is 29.72 kg/m².    This a well-developed, well nourished patient in no acute distress.  They are alert and oriented and cooperative to examination.  Pt. walks with a very slow antalgic gait using a walker    Examination of the right knee shows well-healed surgical incision.  No erythema drainage.   Still has a little swelling in the leg  and knee.  Range of motion is about 3° to 70°.  No calf pain.      X-rays:  Two views of the right knee are  review which show well-aligned right total knee arthroplasty without complication     Assessment::  Status post right robotic assisted total knee arthroplasty    Plan:  I reviewed the findings with her today.  Continue with the aggressive physical therapy.  Continue with the Carolina splint.  Refilled her Norco for her daily pain.  Follow up in 4 weeks.      This note was created using M [employer] voice recognition software that occasionally misinterpreted phrases or words.

## 2024-10-24 ENCOUNTER — OFFICE VISIT (OUTPATIENT)
Dept: ORTHOPEDICS | Facility: CLINIC | Age: 69
End: 2024-10-24
Payer: MEDICARE

## 2024-10-24 VITALS — BODY MASS INDEX: 29.72 KG/M2 | RESPIRATION RATE: 18 BRPM | WEIGHT: 167.75 LBS | HEIGHT: 63 IN

## 2024-10-24 DIAGNOSIS — M25.661 KNEE STIFFNESS, RIGHT: ICD-10-CM

## 2024-10-24 DIAGNOSIS — Z96.651 S/P TOTAL KNEE REPLACEMENT, RIGHT: Primary | ICD-10-CM

## 2024-10-24 PROCEDURE — 99214 OFFICE O/P EST MOD 30 MIN: CPT | Mod: S$PBB,,, | Performed by: ORTHOPAEDIC SURGERY

## 2024-10-24 PROCEDURE — 99213 OFFICE O/P EST LOW 20 MIN: CPT | Mod: PBBFAC,PO | Performed by: ORTHOPAEDIC SURGERY

## 2024-10-24 PROCEDURE — 99999 PR PBB SHADOW E&M-EST. PATIENT-LVL III: CPT | Mod: PBBFAC,,, | Performed by: ORTHOPAEDIC SURGERY

## 2024-10-24 NOTE — PROGRESS NOTES
Past Medical History:   Diagnosis Date    Allergy     Arthritis     Coronary artery disease     GERD (gastroesophageal reflux disease)     Hypertension     Ulcer        Past Surgical History:   Procedure Laterality Date    CARDIAC SURGERY      angiogram    CHOLECYSTECTOMY      EYE SURGERY      lasik    HYSTERECTOMY      KNEE JOINT MANIPULATION Right 9/10/2024    Procedure: MANIPULATION, KNEE;  Surgeon: Rod Orellana MD;  Location: [facility];  Service: Orthopedics;  Laterality: Right;    KNEE SURGERY      scope    ROBOTIC ARTHROPLASTY, KNEE Right 6/25/2024    Procedure: ROBOTIC ARTHROPLASTY, KNEE, TOTAL;  Surgeon: Rod Orellana MD;  Location: [facility];  Service: Orthopedics;  Laterality: Right;  NO SPINAL !!// cruzito    TONSILLECTOMY, ADENOIDECTOMY, BILATERAL MYRINGOTOMY AND TUBES      TUBAL LIGATION         Current Outpatient Medications   Medication Sig    BORON ORAL Take by mouth.    gabapentin (NEURONTIN) 300 MG capsule Take 1 capsule (300 mg total) by mouth 3 (three) times daily.    HYDROcodone-acetaminophen (NORCO)  mg per tablet Take 1 tablet by mouth every 4 (four) hours as needed for Pain.    ondansetron (ZOFRAN) 8 MG tablet TAKE 1 TABLET BY MOUTH EVERY 8 HOURS AS NEEDED FOR NAUSEA    oxyCODONE-acetaminophen (PERCOCET)  mg per tablet Take 1 tablet by mouth every 6 (six) hours as needed for Pain.    UNABLE TO FIND Take 1 capsule by mouth Daily. medication name:NMN supplement     No current facility-administered medications for this visit.       Review of patient's allergies indicates:   Allergen Reactions    Oxycodone Swelling     Other reaction(s):swelling of face  Other reaction(s): Flushing (skin)    Amoxicillin Nausea Only     Cannot tolerate side effects    Doxycycline Nausea Only    Erythromycin Nausea Only     Other reaction(s): Nausea    Imitrex [sumatriptan succinate] Swelling and Palpitations     Other reaction(s): swelling  Other reaction(s): chest pains    Phenergan  [promethazine] Other (See Comments)     Other reaction(s): Muscle pain  Muscle pain    Sulfa (sulfonamide antibiotics) Nausea And Vomiting       Family History   Problem Relation Name Age of Onset    Heart failure Mother      Stroke Mother      Macular degeneration Mother      Hypertension Mother      Heart attack Father      Emphysema Father      Lung cancer Father      Heart attack Sister      Breast cancer Maternal Grandmother      Heart attack Maternal Grandmother      Blindness Maternal Grandmother      Cataracts Maternal Grandmother      Breast cancer Paternal Grandmother      Heart attack Paternal Grandmother      Blindness Paternal Grandmother      Cataracts Paternal Grandmother      Diabetes Paternal Grandmother      Amblyopia Neg Hx      Cancer Neg Hx      Glaucoma Neg Hx      Retinal detachment Neg Hx      Strabismus Neg Hx      Thyroid disease Neg Hx         Social History     Socioeconomic History    Marital status:    Tobacco Use    Smoking status: Never    Smokeless tobacco: Never   Substance and Sexual Activity    Alcohol use: No    Drug use: No    Sexual activity: Yes     Partners: Male     Social Drivers of Health     Financial Resource Strain: Low Risk  (9/11/2024)    Overall Financial Resource Strain (CARDIA)     Difficulty of Paying Living Expenses: Not hard at all   Food Insecurity: No Food Insecurity (9/11/2024)    Hunger Vital Sign     Worried About Running Out of Food in the Last Year: Never true     Ran Out of Food in the Last Year: Never true   Transportation Needs: No Transportation Needs (9/11/2024)    TRANSPORTATION NEEDS     Transportation : No   Physical Activity: Sufficiently Active (9/11/2024)    Exercise Vital Sign     Days of Exercise per Week: 3 days     Minutes of Exercise per Session: 60 min   Stress: No Stress Concern Present (9/11/2024)    [employer] [place] [employer] - Occupational Stress Questionnaire     Feeling of Stress : Not at all   Housing  Stability: Low Risk  (9/11/2024)    Housing Stability Vital Sign     Unable to Pay for Housing in the Last Year: No     Homeless in the Last Year: No       Chief Complaint:   No chief complaint on file.      Date of surgery:  June 25, 2024 right total knee   September 10, 2024 right knee manipulation    History of present illness:  69-year-old female underwent right robotic assisted total knee arthroplasty.  Patient is doing a little bit better.  She is able to get up to 102° in physical therapy.  It took a lot of trial and error to try and find a medication regimen that would help with her pain.  She is currently on Percocet 10.   She is also using the Carolina splint.  Pain is currently a 2/10.  She is taking Norco during the day and just Percocet for physical therapy.    Review of Systems:    Musculoskeletal:  See HPI        Physical Examination:    Vital Signs:  There were no vitals filed for this visit.    There is no height or weight on file to calculate BMI.    This a well-developed, well nourished patient in no acute distress.  They are alert and oriented and cooperative to examination.  Pt. walks with a very slow antalgic gait using a walker    Examination of the right knee shows well-healed surgical incision.  No erythema drainage.   Still has a little swelling in the leg and knee.  Range of motion is about 3° to 80°.  No calf pain.      X-rays:  Two views of the right knee are  review which show well-aligned right total knee arthroplasty without complication     Assessment::  Status post right robotic assisted total knee arthroplasty    Plan:  I reviewed the findings with her today.  Continue with the aggressive physical therapy.  Continue with the Carolina splint.  Refilled her Norco for her daily pain.  Follow up in six weeks..      This note was created using [employer] voice recognition software that occasionally misinterpreted phrases or words.

## 2024-10-29 ENCOUNTER — PATIENT MESSAGE (OUTPATIENT)
Dept: ORTHOPEDICS | Facility: CLINIC | Age: 69
End: 2024-10-29
Payer: MEDICARE

## 2024-10-29 DIAGNOSIS — R11.0 NAUSEA: ICD-10-CM

## 2024-10-29 DIAGNOSIS — M25.661 KNEE STIFFNESS, RIGHT: ICD-10-CM

## 2024-10-29 DIAGNOSIS — Z96.651 S/P TOTAL KNEE REPLACEMENT, RIGHT: ICD-10-CM

## 2024-10-29 RX ORDER — HYDROCODONE BITARTRATE AND ACETAMINOPHEN 10; 325 MG/1; MG/1
1 TABLET ORAL EVERY 4 HOURS PRN
Qty: 42 TABLET | Refills: 0 | Status: SHIPPED | OUTPATIENT
Start: 2024-10-29

## 2024-10-29 RX ORDER — ONDANSETRON HYDROCHLORIDE 8 MG/1
8 TABLET, FILM COATED ORAL EVERY 8 HOURS PRN
Qty: 40 TABLET | Refills: 0 | Status: SHIPPED | OUTPATIENT
Start: 2024-10-29

## 2024-10-30 DIAGNOSIS — Z96.651 S/P TOTAL KNEE REPLACEMENT, RIGHT: Primary | ICD-10-CM

## 2024-10-30 RX ORDER — ONDANSETRON 4 MG/1
4 TABLET, FILM COATED ORAL EVERY 6 HOURS PRN
Qty: 30 TABLET | Refills: 0 | Status: SHIPPED | OUTPATIENT
Start: 2024-10-30

## 2024-10-30 RX ORDER — HYDROCODONE BITARTRATE AND ACETAMINOPHEN 10; 325 MG/1; MG/1
1 TABLET ORAL EVERY 4 HOURS PRN
Qty: 42 TABLET | Refills: 0 | Status: SHIPPED | OUTPATIENT
Start: 2024-10-30

## 2024-11-08 ENCOUNTER — HOSPITAL ENCOUNTER (INPATIENT)
Facility: HOSPITAL | Age: 69
LOS: 2 days | Discharge: HOME | DRG: 392 | End: 2024-11-12
Attending: STUDENT IN AN ORGANIZED HEALTH CARE EDUCATION/TRAINING PROGRAM | Admitting: HOSPITALIST
Payer: MEDICARE

## 2024-11-08 ENCOUNTER — NURSE TRIAGE (OUTPATIENT)
Dept: ADMINISTRATIVE | Facility: CLINIC | Age: 69
End: 2024-11-08
Payer: MEDICARE

## 2024-11-08 DIAGNOSIS — R10.13 ACUTE EPIGASTRIC PAIN: ICD-10-CM

## 2024-11-08 DIAGNOSIS — R10.13 EPIGASTRIC PAIN: Primary | ICD-10-CM

## 2024-11-08 LAB
ALBUMIN SERPL BCP-MCNC: 4.1 G/DL (ref 3.5–5.2)
ALP SERPL-CCNC: 87 U/L (ref 40–150)
ALT SERPL W/O P-5'-P-CCNC: 16 U/L (ref 10–44)
ANION GAP SERPL CALC-SCNC: 12 MMOL/L (ref 8–16)
AST SERPL-CCNC: 17 U/L (ref 10–40)
BASOPHILS # BLD AUTO: 0.04 K/UL (ref 0–0.2)
BASOPHILS NFR BLD: 0.4 % (ref 0–1.9)
BILIRUB SERPL-MCNC: 0.8 MG/DL (ref 0.1–1)
BILIRUB UR QL STRIP: NEGATIVE
BUN SERPL-MCNC: 15 MG/DL (ref 8–23)
CALCIUM SERPL-MCNC: 9.7 MG/DL (ref 8.7–10.5)
CHLORIDE SERPL-SCNC: 107 MMOL/L (ref 95–110)
CLARITY UR: CLEAR
CO2 SERPL-SCNC: 23 MMOL/L (ref 23–29)
COLOR UR: YELLOW
CREAT SERPL-MCNC: 0.9 MG/DL (ref 0.5–1.4)
DIFFERENTIAL METHOD BLD: NORMAL
EOSINOPHIL # BLD AUTO: 0.1 K/UL (ref 0–0.5)
EOSINOPHIL NFR BLD: 1.3 % (ref 0–8)
ERYTHROCYTE [DISTWIDTH] IN BLOOD BY AUTOMATED COUNT: 13.3 % (ref 11.5–14.5)
EST. GFR  (NO RACE VARIABLE): >60 ML/MIN/1.73 M^2
GLUCOSE SERPL-MCNC: 106 MG/DL (ref 70–110)
GLUCOSE UR QL STRIP: NEGATIVE
HCT VFR BLD AUTO: 43.8 % (ref 37–48.5)
HCV AB SERPL QL IA: NEGATIVE
HGB BLD-MCNC: 14.3 G/DL (ref 12–16)
HGB UR QL STRIP: NEGATIVE
HIV 1+2 AB+HIV1 P24 AG SERPL QL IA: NEGATIVE
IMM GRANULOCYTES # BLD AUTO: 0.02 K/UL (ref 0–0.04)
IMM GRANULOCYTES NFR BLD AUTO: 0.2 % (ref 0–0.5)
KETONES UR QL STRIP: ABNORMAL
LEUKOCYTE ESTERASE UR QL STRIP: NEGATIVE
LIPASE SERPL-CCNC: 27 U/L (ref 4–60)
LYMPHOCYTES # BLD AUTO: 3 K/UL (ref 1–4.8)
LYMPHOCYTES NFR BLD: 31.8 % (ref 18–48)
MCH RBC QN AUTO: 28 PG (ref 27–31)
MCHC RBC AUTO-ENTMCNC: 32.6 G/DL (ref 32–36)
MCV RBC AUTO: 86 FL (ref 82–98)
MONOCYTES # BLD AUTO: 0.6 K/UL (ref 0.3–1)
MONOCYTES NFR BLD: 6.4 % (ref 4–15)
NEUTROPHILS # BLD AUTO: 5.6 K/UL (ref 1.8–7.7)
NEUTROPHILS NFR BLD: 59.9 % (ref 38–73)
NITRITE UR QL STRIP: NEGATIVE
NRBC BLD-RTO: 0 /100 WBC
PH UR STRIP: 6 [PH] (ref 5–8)
PLATELET # BLD AUTO: 292 K/UL (ref 150–450)
PMV BLD AUTO: 11.3 FL (ref 9.2–12.9)
POTASSIUM SERPL-SCNC: 3.4 MMOL/L (ref 3.5–5.1)
PROT SERPL-MCNC: 7 G/DL (ref 6–8.4)
PROT UR QL STRIP: ABNORMAL
RBC # BLD AUTO: 5.1 M/UL (ref 4–5.4)
SODIUM SERPL-SCNC: 142 MMOL/L (ref 136–145)
SP GR UR STRIP: 1.02 (ref 1–1.03)
TROPONIN I SERPL DL<=0.01 NG/ML-MCNC: <0.006 NG/ML (ref 0–0.03)
URN SPEC COLLECT METH UR: ABNORMAL
UROBILINOGEN UR STRIP-ACNC: NEGATIVE EU/DL
WBC # BLD AUTO: 9.34 K/UL (ref 3.9–12.7)

## 2024-11-08 PROCEDURE — 96375 TX/PRO/DX INJ NEW DRUG ADDON: CPT

## 2024-11-08 PROCEDURE — 93005 ELECTROCARDIOGRAM TRACING: CPT

## 2024-11-08 PROCEDURE — 87389 HIV-1 AG W/HIV-1&-2 AB AG IA: CPT | Performed by: STUDENT IN AN ORGANIZED HEALTH CARE EDUCATION/TRAINING PROGRAM

## 2024-11-08 PROCEDURE — 25500020 PHARM REV CODE 255

## 2024-11-08 PROCEDURE — 81003 URINALYSIS AUTO W/O SCOPE: CPT | Performed by: STUDENT IN AN ORGANIZED HEALTH CARE EDUCATION/TRAINING PROGRAM

## 2024-11-08 PROCEDURE — 96374 THER/PROPH/DIAG INJ IV PUSH: CPT

## 2024-11-08 PROCEDURE — 36415 COLL VENOUS BLD VENIPUNCTURE: CPT | Performed by: STUDENT IN AN ORGANIZED HEALTH CARE EDUCATION/TRAINING PROGRAM

## 2024-11-08 PROCEDURE — 84484 ASSAY OF TROPONIN QUANT: CPT | Performed by: STUDENT IN AN ORGANIZED HEALTH CARE EDUCATION/TRAINING PROGRAM

## 2024-11-08 PROCEDURE — 25000003 PHARM REV CODE 250: Performed by: STUDENT IN AN ORGANIZED HEALTH CARE EDUCATION/TRAINING PROGRAM

## 2024-11-08 PROCEDURE — 93010 ELECTROCARDIOGRAM REPORT: CPT | Mod: ,,, | Performed by: GENERAL PRACTICE

## 2024-11-08 PROCEDURE — 99285 EMERGENCY DEPT VISIT HI MDM: CPT | Mod: 25

## 2024-11-08 PROCEDURE — 86803 HEPATITIS C AB TEST: CPT | Performed by: STUDENT IN AN ORGANIZED HEALTH CARE EDUCATION/TRAINING PROGRAM

## 2024-11-08 PROCEDURE — 85025 COMPLETE CBC W/AUTO DIFF WBC: CPT | Performed by: STUDENT IN AN ORGANIZED HEALTH CARE EDUCATION/TRAINING PROGRAM

## 2024-11-08 PROCEDURE — 63600175 PHARM REV CODE 636 W HCPCS: Performed by: STUDENT IN AN ORGANIZED HEALTH CARE EDUCATION/TRAINING PROGRAM

## 2024-11-08 PROCEDURE — 36415 COLL VENOUS BLD VENIPUNCTURE: CPT | Performed by: EMERGENCY MEDICINE

## 2024-11-08 PROCEDURE — 96376 TX/PRO/DX INJ SAME DRUG ADON: CPT

## 2024-11-08 PROCEDURE — 80053 COMPREHEN METABOLIC PANEL: CPT | Performed by: STUDENT IN AN ORGANIZED HEALTH CARE EDUCATION/TRAINING PROGRAM

## 2024-11-08 PROCEDURE — 83690 ASSAY OF LIPASE: CPT | Performed by: STUDENT IN AN ORGANIZED HEALTH CARE EDUCATION/TRAINING PROGRAM

## 2024-11-08 RX ORDER — FAMOTIDINE 10 MG/ML
20 INJECTION INTRAVENOUS
Status: COMPLETED | OUTPATIENT
Start: 2024-11-08 | End: 2024-11-08

## 2024-11-08 RX ORDER — MORPHINE SULFATE 4 MG/ML
4 INJECTION, SOLUTION INTRAMUSCULAR; INTRAVENOUS
Status: COMPLETED | OUTPATIENT
Start: 2024-11-08 | End: 2024-11-08

## 2024-11-08 RX ADMIN — MORPHINE SULFATE 4 MG: 4 INJECTION INTRAVENOUS at 09:11

## 2024-11-08 RX ADMIN — FAMOTIDINE 20 MG: 10 INJECTION, SOLUTION INTRAVENOUS at 09:11

## 2024-11-08 RX ADMIN — IOHEXOL 75 ML: 350 INJECTION, SOLUTION INTRAVENOUS at 10:11

## 2024-11-08 RX ADMIN — MORPHINE SULFATE 4 MG: 4 INJECTION INTRAVENOUS at 11:11

## 2024-11-09 PROBLEM — Z96.651 HISTORY OF TOTAL KNEE ARTHROPLASTY, RIGHT: Status: ACTIVE | Noted: 2024-11-09

## 2024-11-09 PROBLEM — R10.13 ACUTE EPIGASTRIC PAIN: Status: ACTIVE | Noted: 2024-11-09

## 2024-11-09 LAB
ANION GAP SERPL CALC-SCNC: 8 MMOL/L (ref 8–16)
BASOPHILS # BLD AUTO: 0.03 K/UL (ref 0–0.2)
BASOPHILS NFR BLD: 0.4 % (ref 0–1.9)
BUN SERPL-MCNC: 14 MG/DL (ref 8–23)
CALCIUM SERPL-MCNC: 8.6 MG/DL (ref 8.7–10.5)
CHLORIDE SERPL-SCNC: 110 MMOL/L (ref 95–110)
CO2 SERPL-SCNC: 25 MMOL/L (ref 23–29)
CREAT SERPL-MCNC: 0.8 MG/DL (ref 0.5–1.4)
DIFFERENTIAL METHOD BLD: NORMAL
EOSINOPHIL # BLD AUTO: 0.1 K/UL (ref 0–0.5)
EOSINOPHIL NFR BLD: 1.5 % (ref 0–8)
ERYTHROCYTE [DISTWIDTH] IN BLOOD BY AUTOMATED COUNT: 13.1 % (ref 11.5–14.5)
EST. GFR  (NO RACE VARIABLE): >60 ML/MIN/1.73 M^2
GLUCOSE SERPL-MCNC: 88 MG/DL (ref 70–110)
HCT VFR BLD AUTO: 39.2 % (ref 37–48.5)
HGB BLD-MCNC: 12.6 G/DL (ref 12–16)
IMM GRANULOCYTES # BLD AUTO: 0.02 K/UL (ref 0–0.04)
IMM GRANULOCYTES NFR BLD AUTO: 0.2 % (ref 0–0.5)
LYMPHOCYTES # BLD AUTO: 2.8 K/UL (ref 1–4.8)
LYMPHOCYTES NFR BLD: 34.3 % (ref 18–48)
MAGNESIUM SERPL-MCNC: 2 MG/DL (ref 1.6–2.6)
MCH RBC QN AUTO: 27.9 PG (ref 27–31)
MCHC RBC AUTO-ENTMCNC: 32.1 G/DL (ref 32–36)
MCV RBC AUTO: 87 FL (ref 82–98)
MONOCYTES # BLD AUTO: 0.8 K/UL (ref 0.3–1)
MONOCYTES NFR BLD: 9.5 % (ref 4–15)
NEUTROPHILS # BLD AUTO: 4.5 K/UL (ref 1.8–7.7)
NEUTROPHILS NFR BLD: 54.1 % (ref 38–73)
NRBC BLD-RTO: 0 /100 WBC
PHOSPHATE SERPL-MCNC: 3.9 MG/DL (ref 2.7–4.5)
PLATELET # BLD AUTO: 271 K/UL (ref 150–450)
PMV BLD AUTO: 10.3 FL (ref 9.2–12.9)
POTASSIUM SERPL-SCNC: 3.5 MMOL/L (ref 3.5–5.1)
RBC # BLD AUTO: 4.52 M/UL (ref 4–5.4)
SODIUM SERPL-SCNC: 143 MMOL/L (ref 136–145)
WBC # BLD AUTO: 8.24 K/UL (ref 3.9–12.7)

## 2024-11-09 PROCEDURE — 80048 BASIC METABOLIC PNL TOTAL CA: CPT

## 2024-11-09 PROCEDURE — 63600175 PHARM REV CODE 636 W HCPCS

## 2024-11-09 PROCEDURE — 25000003 PHARM REV CODE 250: Performed by: HOSPITALIST

## 2024-11-09 PROCEDURE — 94760 N-INVAS EAR/PLS OXIMETRY 1: CPT

## 2024-11-09 PROCEDURE — 97161 PT EVAL LOW COMPLEX 20 MIN: CPT

## 2024-11-09 PROCEDURE — G0378 HOSPITAL OBSERVATION PER HR: HCPCS

## 2024-11-09 PROCEDURE — 97535 SELF CARE MNGMENT TRAINING: CPT

## 2024-11-09 PROCEDURE — 25000003 PHARM REV CODE 250

## 2024-11-09 PROCEDURE — 85025 COMPLETE CBC W/AUTO DIFF WBC: CPT

## 2024-11-09 PROCEDURE — 36415 COLL VENOUS BLD VENIPUNCTURE: CPT

## 2024-11-09 PROCEDURE — 83735 ASSAY OF MAGNESIUM: CPT

## 2024-11-09 PROCEDURE — 84100 ASSAY OF PHOSPHORUS: CPT

## 2024-11-09 PROCEDURE — 99223 1ST HOSP IP/OBS HIGH 75: CPT | Mod: ,,, | Performed by: STUDENT IN AN ORGANIZED HEALTH CARE EDUCATION/TRAINING PROGRAM

## 2024-11-09 PROCEDURE — 97110 THERAPEUTIC EXERCISES: CPT

## 2024-11-09 PROCEDURE — 94761 N-INVAS EAR/PLS OXIMETRY MLT: CPT

## 2024-11-09 RX ORDER — PANTOPRAZOLE SODIUM 40 MG/10ML
40 INJECTION, POWDER, LYOPHILIZED, FOR SOLUTION INTRAVENOUS DAILY
Status: DISCONTINUED | OUTPATIENT
Start: 2024-11-09 | End: 2024-11-12 | Stop reason: HOSPADM

## 2024-11-09 RX ORDER — SODIUM CHLORIDE 9 MG/ML
INJECTION, SOLUTION INTRAVENOUS ONCE
Status: COMPLETED | OUTPATIENT
Start: 2024-11-09 | End: 2024-11-09

## 2024-11-09 RX ORDER — TALC
9 POWDER (GRAM) TOPICAL NIGHTLY PRN
Status: DISCONTINUED | OUTPATIENT
Start: 2024-11-09 | End: 2024-11-12 | Stop reason: HOSPADM

## 2024-11-09 RX ORDER — ALUMINUM HYDROXIDE, MAGNESIUM HYDROXIDE, AND SIMETHICONE 1200; 120; 1200 MG/30ML; MG/30ML; MG/30ML
30 SUSPENSION ORAL 4 TIMES DAILY PRN
Status: DISCONTINUED | OUTPATIENT
Start: 2024-11-09 | End: 2024-11-12 | Stop reason: HOSPADM

## 2024-11-09 RX ORDER — SUCRALFATE 1 G/10ML
1 SUSPENSION ORAL EVERY 6 HOURS
Status: DISCONTINUED | OUTPATIENT
Start: 2024-11-09 | End: 2024-11-12 | Stop reason: HOSPADM

## 2024-11-09 RX ORDER — ACETAMINOPHEN 325 MG/1
650 TABLET ORAL EVERY 4 HOURS PRN
Status: DISCONTINUED | OUTPATIENT
Start: 2024-11-09 | End: 2024-11-12 | Stop reason: HOSPADM

## 2024-11-09 RX ORDER — PANTOPRAZOLE SODIUM 40 MG/1
40 TABLET, DELAYED RELEASE ORAL 2 TIMES DAILY
Qty: 60 TABLET | Refills: 0 | Status: SHIPPED | OUTPATIENT
Start: 2024-11-09 | End: 2025-11-09

## 2024-11-09 RX ORDER — HYDRALAZINE HYDROCHLORIDE 20 MG/ML
10 INJECTION INTRAMUSCULAR; INTRAVENOUS EVERY 6 HOURS PRN
Status: DISCONTINUED | OUTPATIENT
Start: 2024-11-09 | End: 2024-11-12 | Stop reason: HOSPADM

## 2024-11-09 RX ORDER — AMOXICILLIN 250 MG
1 CAPSULE ORAL 2 TIMES DAILY PRN
Status: DISCONTINUED | OUTPATIENT
Start: 2024-11-09 | End: 2024-11-12 | Stop reason: HOSPADM

## 2024-11-09 RX ORDER — MORPHINE SULFATE 2 MG/ML
2 INJECTION, SOLUTION INTRAMUSCULAR; INTRAVENOUS EVERY 4 HOURS PRN
Status: DISCONTINUED | OUTPATIENT
Start: 2024-11-09 | End: 2024-11-09

## 2024-11-09 RX ORDER — IBUPROFEN 200 MG
24 TABLET ORAL
Status: DISCONTINUED | OUTPATIENT
Start: 2024-11-09 | End: 2024-11-12 | Stop reason: HOSPADM

## 2024-11-09 RX ORDER — LANOLIN ALCOHOL/MO/W.PET/CERES
800 CREAM (GRAM) TOPICAL
Status: DISCONTINUED | OUTPATIENT
Start: 2024-11-09 | End: 2024-11-12 | Stop reason: HOSPADM

## 2024-11-09 RX ORDER — MORPHINE SULFATE 2 MG/ML
2 INJECTION, SOLUTION INTRAMUSCULAR; INTRAVENOUS EVERY 4 HOURS PRN
Status: DISCONTINUED | OUTPATIENT
Start: 2024-11-09 | End: 2024-11-12 | Stop reason: HOSPADM

## 2024-11-09 RX ORDER — IBUPROFEN 200 MG
16 TABLET ORAL
Status: DISCONTINUED | OUTPATIENT
Start: 2024-11-09 | End: 2024-11-12 | Stop reason: HOSPADM

## 2024-11-09 RX ORDER — GLUCAGON 1 MG
1 KIT INJECTION
Status: DISCONTINUED | OUTPATIENT
Start: 2024-11-09 | End: 2024-11-12 | Stop reason: HOSPADM

## 2024-11-09 RX ORDER — SODIUM CHLORIDE 0.9 % (FLUSH) 0.9 %
10 SYRINGE (ML) INJECTION EVERY 12 HOURS PRN
Status: DISCONTINUED | OUTPATIENT
Start: 2024-11-09 | End: 2024-11-12 | Stop reason: HOSPADM

## 2024-11-09 RX ORDER — NALOXONE HCL 0.4 MG/ML
0.02 VIAL (ML) INJECTION
Status: DISCONTINUED | OUTPATIENT
Start: 2024-11-09 | End: 2024-11-12 | Stop reason: HOSPADM

## 2024-11-09 RX ORDER — ONDANSETRON HYDROCHLORIDE 2 MG/ML
4 INJECTION, SOLUTION INTRAVENOUS EVERY 6 HOURS PRN
Status: DISCONTINUED | OUTPATIENT
Start: 2024-11-09 | End: 2024-11-10

## 2024-11-09 RX ORDER — MORPHINE SULFATE 4 MG/ML
4 INJECTION, SOLUTION INTRAMUSCULAR; INTRAVENOUS EVERY 4 HOURS PRN
Status: DISCONTINUED | OUTPATIENT
Start: 2024-11-09 | End: 2024-11-12 | Stop reason: HOSPADM

## 2024-11-09 RX ADMIN — MORPHINE SULFATE 2 MG: 2 INJECTION, SOLUTION INTRAMUSCULAR; INTRAVENOUS at 08:11

## 2024-11-09 RX ADMIN — ONDANSETRON 4 MG: 2 INJECTION INTRAMUSCULAR; INTRAVENOUS at 08:11

## 2024-11-09 RX ADMIN — MORPHINE SULFATE 2 MG: 2 INJECTION, SOLUTION INTRAMUSCULAR; INTRAVENOUS at 01:11

## 2024-11-09 RX ADMIN — SODIUM CHLORIDE: 9 INJECTION, SOLUTION INTRAVENOUS at 03:11

## 2024-11-09 RX ADMIN — SUCRALFATE 1 G: 1 SUSPENSION ORAL at 04:11

## 2024-11-09 RX ADMIN — PANTOPRAZOLE SODIUM 40 MG: 40 INJECTION, POWDER, FOR SOLUTION INTRAVENOUS at 08:11

## 2024-11-09 RX ADMIN — MORPHINE SULFATE 2 MG: 2 INJECTION, SOLUTION INTRAMUSCULAR; INTRAVENOUS at 04:11

## 2024-11-09 RX ADMIN — SUCRALFATE 1 G: 1 SUSPENSION ORAL at 11:11

## 2024-11-09 RX ADMIN — MORPHINE SULFATE 4 MG: 4 INJECTION INTRAVENOUS at 08:11

## 2024-11-09 RX ADMIN — ONDANSETRON 4 MG: 2 INJECTION INTRAMUSCULAR; INTRAVENOUS at 01:11

## 2024-11-09 NOTE — ED NOTES
Abi Duran presents to the ED with c/o epigastric pain that started last night. Patient reports episodes of hypertension since having her knee surgery.   Mucous membranes are pink and moist. Skin is warm, dry and intact. .  JEAN BADILLO  Verified patient's name and date of birth.

## 2024-11-09 NOTE — PLAN OF CARE
Problem: Adult Inpatient Plan of Care  Goal: Plan of Care Review  Outcome: Progressing     Problem: Pain Acute  Goal: Optimal Pain Control and Function  Outcome: Progressing

## 2024-11-09 NOTE — ASSESSMENT & PLAN NOTE
-right total knee arthroplasty with manipulation 09/2024  -taking opiates and Zofran since procedure  -denies NSAIDs

## 2024-11-09 NOTE — PT/OT/SLP EVAL
Physical Therapy Evaluation    Patient Name:  Abi Duran   MRN:  [id number]    Recommendations:     Discharge Recommendations: Low Intensity Therapy (resume outpatient PT)  Discharge Equipment Recommendations: none   Barriers to discharge: None    Assessment:     Abi Duran is a 69 y.o. female admitted with a medical diagnosis of Acute epigastric pain.  She presents with the following impairments/functional limitations: weakness, impaired endurance, orthopedic precautions, impaired balance, gait instability, impaired functional mobility, decreased lower extremity function, decreased ROM  .    Rehab Prognosis: Good; patient would benefit from acute skilled PT services to address these deficits and reach maximum level of function.    Recent Surgery: * No surgery found *      Plan:     During this hospitalization, patient to be seen   to address the identified rehab impairments via gait training, therapeutic activities, therapeutic exercises and progress toward the following goals:    Plan of Care Expires:       Subjective     Patient/Family Comments/goals: verbalizes understanding of home exercise program    Living Environment:  House with spouse, 1 small step to enter.  Prior to admission, patients level of function was uses rolling walker.  Equipment used at home: walker, rolling.  DME owned (not currently used): shower chair.  Upon discharge, patient will have assistance from family.    Objective:     Communicated with nurse (Dorita) prior to session.  Patient found up in chair with peripheral IV, telemetry (chair alarm)  upon PT entry to room.    General Precautions: Standard, fall  Orthopedic Precautions:    Braces: N/A  Respiratory Status: Room air    Functional Mobility training:  Bed Mobility: N/T due to found seated in chair  Transfers:     Sit to Stand:  contact guard assistance with rolling walker  Gait: 250' with RW with CG/SBA      AM-PAC 6 CLICK MOBILITY  Total Score:        Treatment &  Education:  Mobility training as above with cues for technique  SEATED: LAQ;  self PROM/stretch x 10 min. to increase knee flexion rom (emphasized to patient importance of performing rom/stretching several times per day and to position knee in flexion for prolonged periods to increase rom and prevent contracture; patient verbalized and demonstrated understanding)     Patient left up in chair with all lines intact, call button in reach, chair alarm on, and family present.    GOALS:   Multidisciplinary Problems       Physical Therapy Goals       Not on file                    History:     Past Medical History:   Diagnosis Date    Allergy     Arthritis     Coronary artery disease     GERD (gastroesophageal reflux disease)     Hypertension     Ulcer        Past Surgical History:   Procedure Laterality Date    CARDIAC SURGERY      angiogram    CHOLECYSTECTOMY      EYE SURGERY      lasik    HYSTERECTOMY      KNEE JOINT MANIPULATION Right 9/10/2024    Procedure: MANIPULATION, KNEE;  Surgeon: Rod Orellana MD;  Location: [facility] OR;  Service: Orthopedics;  Laterality: Right;    KNEE SURGERY      scope    ROBOTIC ARTHROPLASTY, KNEE Right 6/25/2024    Procedure: ROBOTIC ARTHROPLASTY, KNEE, TOTAL;  Surgeon: Rod Orellana MD;  Location: [facility] OR;  Service: Orthopedics;  Laterality: Right;  NO SPINAL !!// cruzito    TONSILLECTOMY, ADENOIDECTOMY, BILATERAL MYRINGOTOMY AND TUBES      TUBAL LIGATION         Time Tracking:     PT Received On: 11/09/24  PT Start Time: 1142     PT Stop Time: 1212  PT Total Time (min): 30 min     Billable Minutes: Evaluation 15 and Therapeutic Exercise 15      11/09/2024

## 2024-11-09 NOTE — ED PROVIDER NOTES
Encounter Date: 11/8/2024       History     Chief Complaint   Patient presents with    Abdominal Pain     Epigastric pain after eating Mexican food last night, nausea. Normal BM. HYPERTENSIVE without diagnosis.     69 y.o. female with GERD, coronary artery disease, HTN presents for epigastric pain.  Patient reports since last night she has had epigastric pain and nausea.  The pain is nonradiating, does not radiate to her chest, back, or lower abdomen.  She denies history of similar pain in the past.  She reports associated nausea but has not vomited.  Symptoms seem to have started after eating Mexican food last night.  Her  also felt similar symptoms after the meal though not as severe.  She denies any urinary symptoms, fever/chills, chest pain, shortness of breath    The history is provided by the patient and medical records.     Review of patient's allergies indicates:   Allergen Reactions    Oxycodone Swelling     Other reaction(s):swelling of face  Other reaction(s): Flushing (skin)    Amoxicillin Nausea Only     Cannot tolerate side effects    Doxycycline Nausea Only    Erythromycin Nausea Only     Other reaction(s): Nausea    Imitrex [sumatriptan succinate] Swelling and Palpitations     Other reaction(s): swelling  Other reaction(s): chest pains    Phenergan [promethazine] Other (See Comments)     Other reaction(s): Muscle pain  Muscle pain    Sulfa (sulfonamide antibiotics) Nausea And Vomiting     Past Medical History:   Diagnosis Date    Allergy     Arthritis     Coronary artery disease     GERD (gastroesophageal reflux disease)     Hypertension     Ulcer      Past Surgical History:   Procedure Laterality Date    CARDIAC SURGERY      angiogram    CHOLECYSTECTOMY      EYE SURGERY      lasik    HYSTERECTOMY      KNEE JOINT MANIPULATION Right 9/10/2024    Procedure: MANIPULATION, KNEE;  Surgeon: Rod Orellana MD;  Location: [facility];  Service: Orthopedics;  Laterality: Right;    KNEE SURGERY       scope    ROBOTIC ARTHROPLASTY, KNEE Right 6/25/2024    Procedure: ROBOTIC ARTHROPLASTY, KNEE, TOTAL;  Surgeon: Rod Orellana MD;  Location: [facility];  Service: Orthopedics;  Laterality: Right;  NO SPINAL !!// cruzito    TONSILLECTOMY, ADENOIDECTOMY, BILATERAL MYRINGOTOMY AND TUBES      TUBAL LIGATION       Family History   Problem Relation Name Age of Onset    Heart failure Mother      Stroke Mother      Macular degeneration Mother      Hypertension Mother      Heart attack Father      Emphysema Father      Lung cancer Father      Heart attack Sister      Breast cancer Maternal Grandmother      Heart attack Maternal Grandmother      Blindness Maternal Grandmother      Cataracts Maternal Grandmother      Breast cancer Paternal Grandmother      Heart attack Paternal Grandmother      Blindness Paternal Grandmother      Cataracts Paternal Grandmother      Diabetes Paternal Grandmother      Amblyopia Neg Hx      Cancer Neg Hx      Glaucoma Neg Hx      Retinal detachment Neg Hx      Strabismus Neg Hx      Thyroid disease Neg Hx       Social History     Tobacco Use    Smoking status: Never    Smokeless tobacco: Never   Substance Use Topics    Alcohol use: No    Drug use: No     Review of Systems   Reason unable to perform ROS: See HPI for relevant ROS.       Physical Exam     Initial Vitals [11/08/24 2015]   BP Pulse Resp Temp SpO2   (!) 231/110 91 18 97.5 °F (36.4 °C) 99 %      MAP       --         Physical Exam    Nursing note and vitals reviewed.  Constitutional:   Alert, normal work of breathing, no acute distress   Eyes: Conjunctivae are normal. No scleral icterus.   Cardiovascular:  Normal rate, regular rhythm and intact distal pulses.           Pulmonary/Chest: Breath sounds normal. No stridor. No respiratory distress.   Abdominal:   Abdomen is soft and nondistended, significant epigastric tenderness, no lower abdominal tenderness   Musculoskeletal:         General: No edema.     Neurological: She is  alert.   Skin: Skin is warm and dry.         ED Course   Procedures  Labs Reviewed   URINALYSIS, REFLEX TO URINE CULTURE - Abnormal       Result Value    Specimen UA Urine, Clean Catch      Color, UA Yellow      Appearance, UA Clear      pH, UA 6.0      Specific Gravity, UA 1.020      Protein, UA Trace (*)     Glucose, UA Negative      Ketones, UA Trace (*)     Bilirubin (UA) Negative      Occult Blood UA Negative      Nitrite, UA Negative      Urobilinogen, UA Negative      Leukocytes, UA Negative      Narrative:     Specimen Source->Urine   COMPREHENSIVE METABOLIC PANEL - Abnormal    Sodium 142      Potassium 3.4 (*)     Chloride 107      CO2 23      Glucose 106      BUN 15      Creatinine 0.9      Calcium 9.7      Total Protein 7.0      Albumin 4.1      Total Bilirubin 0.8      Alkaline Phosphatase 87      AST 17      ALT 16      eGFR >60      Anion Gap 12     CBC W/ AUTO DIFFERENTIAL    WBC 9.34      RBC 5.10      Hemoglobin 14.3      Hematocrit 43.8      MCV 86      MCH 28.0      MCHC 32.6      RDW 13.3      Platelets 292      MPV 11.3      Immature Granulocytes 0.2      Gran # (ANC) 5.6      Immature Grans (Abs) 0.02      Lymph # 3.0      Mono # 0.6      Eos # 0.1      Baso # 0.04      nRBC 0      Gran % 59.9      Lymph % 31.8      Mono % 6.4      Eosinophil % 1.3      Basophil % 0.4      Differential Method Automated      Narrative:     Release to patient->Immediate  Collection has been rescheduled by [facility] at 11/08/2024 20:50 Reason:   Nurse Draw   LIPASE    Lipase 27     TROPONIN I    Troponin I <0.006     HEPATITIS C ANTIBODY    Hepatitis C Ab Negative     HIV 1 / 2 ANTIBODY    HIV 1/2 Ag/Ab Negative     BASIC METABOLIC PANEL   MAGNESIUM   PHOSPHORUS   CBC W/ AUTO DIFFERENTIAL          Imaging Results              CT Abdomen Pelvis With IV Contrast NO Oral Contrast (Final result)  Result time 11/08/24 23:31:21      Final result by Kostas Gibson MD (11/08/24 23:31:21)                   Impression:       No acute findings evident within the abdomen or pelvis.    Nonacute findings as described above.      Electronically signed by: Kostas Gibson  Date:    11/08/2024  Time:    23:31               Narrative:    EXAMINATION:  CT ABDOMEN PELVIS WITH IV CONTRAST    CLINICAL HISTORY:  Epigastric pain;    TECHNIQUE:  Low dose axial images, sagittal and coronal reformations were obtained from the lung bases to the pubic symphysis following the IV administration of 75 mL of Omnipaque 350 .  Oral contrast was not given.    COMPARISON:  None    FINDINGS:  Heart: Normal in size. No pericardial effusion.    Lung Bases: Well aerated, without consolidation or pleural fluid.    Liver: Normal in size and attenuation, with no focal solid hepatic lesions.  Six Hounsfield unit 2 cm cyst right lobe.    Gallbladder: Resected    Bile Ducts: No evidence of dilated ducts.    Pancreas: No mass or peripancreatic fat stranding.    Spleen: Unremarkable.    Adrenals: Unremarkable.    Kidneys/ Ureters: Unremarkable.    Bladder: No evidence of wall thickening.    Reproductive organs: Unremarkable.    GI Tract/Mesentery: No evidence of bowel obstruction or inflammation.  Fluid structure adjacent to the cysts 3rd portion of the duodenum likely representing benign duodenal diverticulum.    Peritoneal Space: No ascites. No free air.    Retroperitoneum: No significant adenopathy.    Abdominal wall: Unremarkable.    Vasculature: No significant atherosclerosis or aneurysm.    Bones: No acute fracture.  Spondylosis atypical the more severe at L1-2.                                       Medications   sodium chloride 0.9% flush 10 mL (has no administration in time range)   melatonin tablet 9 mg (has no administration in time range)   senna-docusate 8.6-50 mg per tablet 1 tablet (has no administration in time range)   naloxone 0.4 mg/mL injection 0.02 mg (has no administration in time range)   potassium bicarbonate disintegrating tablet 50 mEq (has no  administration in time range)   potassium bicarbonate disintegrating tablet 35 mEq (has no administration in time range)   potassium bicarbonate disintegrating tablet 60 mEq (has no administration in time range)   magnesium oxide tablet 800 mg (has no administration in time range)   magnesium oxide tablet 800 mg (has no administration in time range)   glucose chewable tablet 16 g (has no administration in time range)   glucose chewable tablet 24 g (has no administration in time range)   glucagon (human recombinant) injection 1 mg (has no administration in time range)   acetaminophen tablet 650 mg (has no administration in time range)   morphine injection 2 mg (2 mg Intravenous Given 11/9/24 0154)   ondansetron injection 4 mg (4 mg Intravenous Given 11/9/24 0153)   aluminum-magnesium hydroxide-simethicone 200-200-20 mg/5 mL suspension 30 mL (has no administration in time range)   dextrose 10% bolus 125 mL 125 mL (has no administration in time range)   dextrose 10% bolus 250 mL 250 mL (has no administration in time range)   hydrALAZINE injection 10 mg (has no administration in time range)   pantoprazole injection 40 mg (has no administration in time range)   famotidine (PF) injection 20 mg (20 mg Intravenous Given 11/8/24 2106)   morphine injection 4 mg (4 mg Intravenous Given 11/8/24 2105)   iohexoL (OMNIPAQUE 350) 350 mg iodine/mL injection (75 mLs Intravenous Given 11/8/24 2220)   morphine injection 4 mg (4 mg Intravenous Given 11/8/24 2336)   0.9%  NaCl infusion ( Intravenous New Bag 11/9/24 0302)     Medical Decision Making  69 y.o. female with GERD, coronary artery disease, HTN presents for epigastric pain.  Differentials include GERD, peptic ulcer disease, gastritis, gastroenteritis, bowel obstruction, pancreatitis, doubt ACS or PE  Patient presenting with epigastric pain after a meal last night.  Has associated nausea.  Is significantly tender in the epigastrium.  Does have a history of GERD.  No lower  abdominal pain, no urinary symptoms  Considered but presentation overall less consistent with atypical ACS. Patient has clear epigastric tenderness on exam, denies any chest pain or shortness of breath, EKG shows no evidence of acute ischemia, troponin within normal limits    Amount and/or Complexity of Data Reviewed  External Data Reviewed: labs, radiology, ECG and notes.  Labs: ordered.  Radiology: ordered.  ECG/medicine tests:  Decision-making details documented in ED Course.    Risk  Prescription drug management.               ED Course as of 11/09/24 0303   Fri Nov 08, 2024 2132 EKG 12-lead  Findings, per independent interpretation:  Sinus rhythm, regular, narrow complex, rate of 91, no STEMI, no significant ST deviations, overall unchanged compared to prior [OK]   Sat Nov 09, 2024 0302 Patient requiring multiple rounds of morphine for pain control.  CT unremarkable.  I am suspicious for possible peptic ulcer or gastritis.  Will admit for further symptom management and evaluation [OK]      ED Course User Index  [OK] Nelson Zuniga MD                           Clinical Impression:  Final diagnoses:  [R10.13] Epigastric pain (Primary)          ED Disposition Condition    Observation                 Nelson Zuniga MD  11/09/24 0303

## 2024-11-09 NOTE — TELEPHONE ENCOUNTER
Abi calling from [place]. Alex reports nausea and mid epigastric abdominal pain awoke pt from sleep last night. No relief with Zofran. Decreased oral intake today. Keeps belching. Multiple normal BM's today. Pain is worse with movement. Advised pt per triage protocol to go to nearest ED now for physician eval. V/u.     Reason for Disposition   [1] SEVERE pain (e.g., excruciating) AND [2] present > 1 hour    Additional Information   Negative: SEVERE difficulty breathing (e.g., struggling for each breath, speaks in single words)   Negative: Shock suspected (e.g., cold/pale/clammy skin, too weak to stand, low BP, rapid pulse)   Negative: Difficult to awaken or acting confused (e.g., disoriented, slurred speech)   Negative: Passed out (e.g., fainted, lost consciousness, blacked out and was not responding)   Negative: Visible sweat on face or sweat dripping down face   Negative: Sounds like a life-threatening emergency to the triager   Negative: Followed an abdomen (stomach) injury   Negative: Chest pain    Protocols used: Abdominal Pain - Upper-A-AH

## 2024-11-09 NOTE — NURSING
Awake, alert and oriented x4. Spouse at bedside. Reviewed room/unit assigned. Questions answered and encouraged. 20g to rt wrist with site free of s/s infiltration. /93, 82 HR, 96% RA.

## 2024-11-09 NOTE — SUBJECTIVE & OBJECTIVE
Past Medical History:   Diagnosis Date    Allergy     Arthritis     Coronary artery disease     GERD (gastroesophageal reflux disease)     Hypertension     Ulcer        Past Surgical History:   Procedure Laterality Date    CARDIAC SURGERY      angiogram    CHOLECYSTECTOMY      EYE SURGERY      lasik    HYSTERECTOMY      KNEE JOINT MANIPULATION Right 9/10/2024    Procedure: MANIPULATION, KNEE;  Surgeon: Rod Orellana MD;  Location: [facility];  Service: Orthopedics;  Laterality: Right;    KNEE SURGERY      scope    ROBOTIC ARTHROPLASTY, KNEE Right 6/25/2024    Procedure: ROBOTIC ARTHROPLASTY, KNEE, TOTAL;  Surgeon: Rod Orellana MD;  Location: [facility];  Service: Orthopedics;  Laterality: Right;  NO SPINAL !!// cruzito    TONSILLECTOMY, ADENOIDECTOMY, BILATERAL MYRINGOTOMY AND TUBES      TUBAL LIGATION         Review of patient's allergies indicates:   Allergen Reactions    Oxycodone Swelling     Other reaction(s):swelling of face  Other reaction(s): Flushing (skin)    Amoxicillin Nausea Only     Cannot tolerate side effects    Doxycycline Nausea Only    Erythromycin Nausea Only     Other reaction(s): Nausea    Imitrex [sumatriptan succinate] Swelling and Palpitations     Other reaction(s): swelling  Other reaction(s): chest pains    Phenergan [promethazine] Other (See Comments)     Other reaction(s): Muscle pain  Muscle pain    Sulfa (sulfonamide antibiotics) Nausea And Vomiting       No current facility-administered medications on file prior to encounter.     Current Outpatient Medications on File Prior to Encounter   Medication Sig    HYDROcodone-acetaminophen (NORCO)  mg per tablet Take 1 tablet by mouth every 4 (four) hours as needed for Pain.    BORON ORAL Take by mouth.    gabapentin (NEURONTIN) 300 MG capsule Take 1 capsule (300 mg total) by mouth 3 (three) times daily.    HYDROcodone-acetaminophen (NORCO)  mg per tablet Take 1 tablet by mouth every 4 (four) hours as needed for  Pain.    ondansetron (ZOFRAN) 4 MG tablet Take 1 tablet (4 mg total) by mouth every 6 (six) hours as needed for Nausea.    ondansetron (ZOFRAN) 8 MG tablet Take 1 tablet (8 mg total) by mouth every 8 (eight) hours as needed.    UNABLE TO FIND Take 1 capsule by mouth Daily. medication name:NMN supplement     Family History       Problem Relation (Age of Onset)    Blindness Maternal Grandmother, Paternal Grandmother    Breast cancer Maternal Grandmother, Paternal Grandmother    Cataracts Maternal Grandmother, Paternal Grandmother    Diabetes Paternal Grandmother    Emphysema Father    Heart attack Father, Sister, Maternal Grandmother, Paternal Grandmother    Heart failure Mother    Hypertension Mother    Lung cancer Father    Macular degeneration Mother    Stroke Mother          Tobacco Use    Smoking status: Never    Smokeless tobacco: Never   Substance and Sexual Activity    Alcohol use: No    Drug use: No    Sexual activity: Yes     Partners: Male     Review of Systems   Constitutional:  Positive for appetite change. Negative for activity change, chills, diaphoresis, fatigue, fever and unexpected weight change.   Respiratory:  Negative for cough.    Cardiovascular:  Negative for chest pain and leg swelling.   Gastrointestinal:  Positive for abdominal pain (epigastric) and nausea. Negative for abdominal distention, blood in stool, constipation and vomiting.   Genitourinary:  Negative for difficulty urinating, flank pain and hematuria.   Musculoskeletal:  Positive for arthralgias (right knee). Negative for back pain.   Neurological:  Negative for dizziness, seizures, syncope, facial asymmetry, speech difficulty, light-headedness, numbness and headaches.     Objective:     Vital Signs (Most Recent):  Temp: 97.2 °F (36.2 °C) (11/09/24 0231)  Pulse: 84 (11/09/24 0239)  Resp: 18 (11/09/24 0231)  BP: (!) 182/88 (11/09/24 0232)  SpO2: 97 % (11/09/24 0232) Vital Signs (24h Range):  Temp:  [97.2 °F (36.2 °C)-97.5 °F (36.4  °C)] 97.2 °F (36.2 °C)  Pulse:  [77-95] 84  Resp:  [16-20] 18  SpO2:  [93 %-100 %] 97 %  BP: (152-231)/() 182/88     Weight: 76.4 kg (168 lb 6.9 oz)  Body mass index is 30.81 kg/m².     Physical Exam  Vitals and nursing note reviewed.   Constitutional:       Appearance: She is not ill-appearing.   Eyes:      Pupils: Pupils are equal, round, and reactive to light.   Cardiovascular:      Rate and Rhythm: Normal rate and regular rhythm.      Pulses: Normal pulses.      Heart sounds: Normal heart sounds.   Pulmonary:      Effort: Pulmonary effort is normal.      Breath sounds: Normal breath sounds.   Abdominal:      General: Bowel sounds are normal. There is no distension.      Palpations: Abdomen is soft.      Tenderness: There is abdominal tenderness in the epigastric area. There is no guarding (epigastric).   Musculoskeletal:      Right lower leg: No edema.      Left lower leg: No edema.   Skin:     General: Skin is warm and dry.      Capillary Refill: Capillary refill takes less than 2 seconds.   Neurological:      Mental Status: She is alert and oriented to person, place, and time.      GCS: GCS eye subscore is 4. GCS verbal subscore is 5. GCS motor subscore is 6.              CRANIAL NERVES     CN III, IV, VI   Pupils are equal, round, and reactive to light.       Significant Labs: All pertinent labs within the past 24 hours have been reviewed.    Bilirubin:   Recent Labs   Lab 11/08/24 2135   BILITOT 0.8       BMP:   Recent Labs   Lab 11/08/24 2135         K 3.4*      CO2 23   BUN 15   CREATININE 0.9   CALCIUM 9.7     CBC:   Recent Labs   Lab 11/08/24 2050   WBC 9.34   HGB 14.3   HCT 43.8        CMP:   Recent Labs   Lab 11/08/24 2135      K 3.4*      CO2 23      BUN 15   CREATININE 0.9   CALCIUM 9.7   PROT 7.0   ALBUMIN 4.1   BILITOT 0.8   ALKPHOS 87   AST 17   ALT 16   ANIONGAP 12       Lipase:   Recent Labs   Lab 11/08/24 2135   LIPASE 27       Troponin:    Recent Labs   Lab 11/08/24  2135   TROPONINI <0.006       Urine Studies:   Recent Labs   Lab 11/08/24  2156   COLORU Yellow   APPEARANCEUA Clear   PHUR 6.0   SPECGRAV 1.020   PROTEINUA Trace*   GLUCUA Negative   KETONESU Trace*   BILIRUBINUA Negative   OCCULTUA Negative   NITRITE Negative   UROBILINOGEN Negative   LEUKOCYTESUR Negative       Significant Imaging: I have reviewed all pertinent imaging results/findings within the past 24 hours.    CT Abdomen Pelvis With IV Contrast NO Oral Contrast  Order: [id number]  Status: Final result       Visible to patient: Yes (not seen)       Next appt: 12/05/2024 at 09:45 AM in Orthopedics (Rod Orellana MD)    0 Result Notes  Details    Reading Physician Reading Date Result Priority   Kostas Gibson MD  [phone] 11/8/2024 STAT     Narrative & Impression  EXAMINATION:  CT ABDOMEN PELVIS WITH IV CONTRAST     CLINICAL HISTORY:  Epigastric pain;     TECHNIQUE:  Low dose axial images, sagittal and coronal reformations were obtained from the lung bases to the pubic symphysis following the IV administration of 75 mL of Omnipaque 350 .  Oral contrast was not given.     COMPARISON:  None     FINDINGS:  Heart: Normal in size. No pericardial effusion.     Lung Bases: Well aerated, without consolidation or pleural fluid.     Liver: Normal in size and attenuation, with no focal solid hepatic lesions.  Six Hounsfield unit 2 cm cyst right lobe.     Gallbladder: Resected     Bile Ducts: No evidence of dilated ducts.     Pancreas: No mass or peripancreatic fat stranding.     Spleen: Unremarkable.     Adrenals: Unremarkable.     Kidneys/ Ureters: Unremarkable.     Bladder: No evidence of wall thickening.     Reproductive organs: Unremarkable.     GI Tract/Mesentery: No evidence of bowel obstruction or inflammation.  Fluid structure adjacent to the cysts 3rd portion of the duodenum likely representing benign duodenal diverticulum.     Peritoneal Space: No ascites. No free  air.     Retroperitoneum: No significant adenopathy.     Abdominal wall: Unremarkable.     Vasculature: No significant atherosclerosis or aneurysm.     Bones: No acute fracture.  Spondylosis atypical the more severe at L1-2.     Impression:     No acute findings evident within the abdomen or pelvis.     Nonacute findings as described above.        Electronically signed by:Kostas Gibson  Date:                                            11/08/2024  Time:                                           23:31        Exam Ended: 11/08/24 22:24 CST Last Resulted: 11/08/24 23:31 CST

## 2024-11-09 NOTE — ED NOTES
Patient ambulated to the restroom with the use of her walker. Patient has been asked to use the pull cord when needing assistance back to her room. She reports that her pain is starting to come back to her abdomen.

## 2024-11-09 NOTE — HPI
"Abi Duran is a 69 year old female with a past medical history of CAD, GERD, osteoarthritis, hypertension, duodenal ulcer with H pylori 17 years ago, and right knee total arthroplasty manipulation on 09/2024 who presents with complaints epigastric pain associated with nausea after eating Mexican food several hours prior to arrival. She reports after eating Mexican food which included Mexican beans she went to bed then developed severe epigastric pain. She states since then she has only been able to eat crackers and drank water with no relief.  She states sometimes when she swallows her pills "feel like they get stuck."  She endorses having 5 normal consistency bowel movements today.  She denies chest pain, shortness of breath, fever, chills, hematuria, melena, hematochezia, or vomiting. 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 CBC unremarkable.  BMP with hypokalemia 3.4 otherwise unremarkable.  Initial troponin negative and ECG with normal sinus rhythm.  Urinalysis unremarkable.  She was given a GI cocktail in ED with no relief of intractable abdominal pain.  GI consult was placed.  She will be admitted to hospital medicine for further management and treatment.               "

## 2024-11-09 NOTE — PROGRESS NOTES
Pt came from ER via stretcher. Pt was able to slide over to bed. Pt right knee swollen. Pt rates pain to abdomen 2/10. Pt was able to walk with a walker to bathroom. Pt skin intact. Bed low lock and call light in reach. Bed alarm on.  at bedside.

## 2024-11-09 NOTE — PLAN OF CARE
[facility] - Med/Surg  Initial Discharge Assessment       Primary Care Provider: Andrew Perales Jr., MD    Admission Diagnosis: Epigastric pain [R10.13]    Admission Date: 11/8/2024  Expected Discharge Date: 11/9/2024    Transition of Care Barriers: None    Payor: MEDICARE / Plan: MEDICARE PART A & B / Product Type: Government /     Extended Emergency Contact Information  Primary Emergency Contact: Sae Duran  Address: [address] [place] [address] [facility]  Home Phone: [phone]  Mobile Phone: [phone]  Relation: Spouse    Discharge Plan A: Home  Discharge Plan B: Home with family      [facility]  [address]  [facility] [address]  Phone: [phone] Fax: [phone]    [id number] - [place], [place] - [address]  [address]  [place] [address]  Phone: [phone] Fax: [phone]    SW met with patient at bedside to complete discharge planning assessment.  Patient alert and oriented xs 4.  Patient verified all demographic information on facesheet is correct.  Patient verified PCP is Dr. Navarro.  Patient verified primary health insurance is Medicare and secondary is BCBS.  Patient with NO home health or DME.  Patient with NO POA or Living Will.  Patient not on dialysis or medication coumadin.  Patient with no 30 day admission.  Patient with no financial issues at this time.  Patient family will provide transportation upon discharge from facility.  Patient independent with ADLs, live with spouse, spouse drives.      Initial Assessment (most recent)       Adult Discharge Assessment - 11/09/24 1000          Discharge Assessment    Assessment Type Discharge Planning Assessment     Confirmed/corrected address, phone number and insurance Yes     Confirmed Demographics Correct on Facesheet     Source of Information patient;family     Does patient/caregiver understand observation status Yes     Communicated JUAN M  with patient/caregiver Yes     People in Home spouse     Facility Arrived From: home     Do you expect to return to your current living situation? Yes     Do you have help at home or someone to help you manage your care at home? Yes     Who are your caregiver(s) and their phone number(s)? spouse     Prior to hospitilization cognitive status: Alert/Oriented     Current cognitive status: Alert/Oriented     Walking or Climbing Stairs Difficulty yes     Walking or Climbing Stairs ambulation difficulty, requires equipment;stair climbing difficulty, requires equipment     Dressing/Bathing Difficulty yes     Dressing/Bathing bathing difficulty, requires equipment;dressing difficulty, requires equipment     Equipment Currently Used at Home raised toilet;walker, rolling     Readmission within 30 days? No     Patient currently being followed by outpatient case management? No     Do you currently have service(s) that help you manage your care at home? No     Do you take prescription medications? Yes     Do you have prescription coverage? Yes     Do you have any problems affording any of your prescribed medications? No     Is the patient taking medications as prescribed? yes     Who is going to help you get home at discharge? spouse     How do you get to doctors appointments? family or friend will provide     Are you on dialysis? No     Do you take coumadin? No     Discharge Plan A Home     Discharge Plan B Home with family     DME Needed Upon Discharge  none     Discharge Plan discussed with: Patient;Spouse/sig other     Transition of Care Barriers None

## 2024-11-09 NOTE — CONSULTS
"[facility]     CC: nausea, epigastric pain    HPI from H&P  Abi Duran is a 69 year old female with a past medical history of CAD, GERD, osteoarthritis, hypertension, duodenal ulcer with H pylori 17 years ago, and right knee total arthroplasty manipulation on 09/2024 who presents with complaints epigastric pain associated with nausea after eating Mexican food several hours prior to arrival. She reports after eating Mexican food which included Mexican beans she went to bed then developed severe epigastric pain. She states since then she has only been able to eat crackers and drank water with no relief. She states sometimes when she swallows her pills "feel like they get stuck." She endorses having 5 normal consistency bowel movements today. She denies chest pain, shortness of breath, fever, chills, hematuria, melena, hematochezia, or vomiting. ED workup reveals: CT abdomen/pelvis no evidence of bowel obstruction or inflammation but with fluid structure adjacent to the cysts 3rd portion of the duodenum possibly a benign duodenal diverticulum. CBC unremarkable. BMP with hypokalemia 3.4 otherwise unremarkable. Initial troponin negative and ECG with normal sinus rhythm. Urinalysis unremarkable. She was given a GI cocktail in ED with no relief of intractable abdominal pain. GI consult was placed. She will be admitted to hospital medicine for further management and treatment.     Patient was seen and examined at bedside. She reports she continues to have nausea which is relieved with Zofran. Denies any pain at rest presently, but reports tenderness to palpation in the mid epigastric/lower sternal area. Reports symptoms started late Thursday night after having Mexican food for dinner earlier that evening. Ate beans, chips, and salsa. Denies any emesis or diarrhea. Describes the pain as a stabbing pain. Reports hx of duodenal ulcer 17 years ago, but states this pain feels different from that. She denies " any NSAID use. Does not take any antacid medications. Denies any improvement with bowel movements. Denies radiation to her back. Lipase normal. No signs of pancreatitis on CT. Denies any retching. Denies evidence of blood in her stool.    She reports she has had dysphagia symptoms for quite some time. Occasionally will have to make herself vomit food she has just swallowed because she feels it wont go down to her stomach. Reports the emesis will look like undigested food. Last EGD was 17 yrs ago and was told she had a small hiatal hernia at that time. Denies ever having esophageal dilation. She has been taking opiates regularly for her knee pain since June.          Past Medical History:   Diagnosis Date    Allergy     Arthritis     Coronary artery disease     GERD (gastroesophageal reflux disease)     Hypertension     Ulcer        Past Surgical History:   Procedure Laterality Date    CARDIAC SURGERY      angiogram    CHOLECYSTECTOMY      EYE SURGERY      lasik    HYSTERECTOMY      KNEE JOINT MANIPULATION Right 9/10/2024    Procedure: MANIPULATION, KNEE;  Surgeon: Rod Orellana MD;  Location: [facility] OR;  Service: Orthopedics;  Laterality: Right;    KNEE SURGERY      scope    ROBOTIC ARTHROPLASTY, KNEE Right 6/25/2024    Procedure: ROBOTIC ARTHROPLASTY, KNEE, TOTAL;  Surgeon: Rod Orellana MD;  Location: [facility];  Service: Orthopedics;  Laterality: Right;  NO SPINAL !!// cruzito    TONSILLECTOMY, ADENOIDECTOMY, BILATERAL MYRINGOTOMY AND TUBES      TUBAL LIGATION         Social History     Tobacco Use    Smoking status: Never    Smokeless tobacco: Never   Substance Use Topics    Alcohol use: No    Drug use: No       Family History   Problem Relation Name Age of Onset    Heart failure Mother      Stroke Mother      Macular degeneration Mother      Hypertension Mother      Heart attack Father      Emphysema Father      Lung cancer Father      Heart attack Sister      Breast cancer Maternal Grandmother   "    Heart attack Maternal Grandmother      Blindness Maternal Grandmother      Cataracts Maternal Grandmother      Breast cancer Paternal Grandmother      Heart attack Paternal Grandmother      Blindness Paternal Grandmother      Cataracts Paternal Grandmother      Diabetes Paternal Grandmother      Amblyopia Neg Hx      Cancer Neg Hx      Glaucoma Neg Hx      Retinal detachment Neg Hx      Strabismus Neg Hx      Thyroid disease Neg Hx         Review of Systems  General ROS: negative for - chills, fever  Gastrointestinal ROS: positive for - dysphagia, epigastric pain, nausea; negative for hematemesis, hematochezia, melena    Physical Examination  BP (!) 179/79 (BP Location: Left arm, Patient Position: Lying)   Pulse 69   Temp 97.6 °F (36.4 °C) (Oral)   Resp 18   Ht 5' 2" (1.575 m)   Wt 76.4 kg (168 lb 6.9 oz)   SpO2 98%   BMI 30.81 kg/m²   General appearance: alert, cooperative, no distress  Lungs: clear to auscultation bilaterally, breathing unlabored  Heart: regular rate and rhythm without rub   Abdomen: mild TTP in the epigastric region; soft, non-distended      Labs:  Lab Results   Component Value Date    WBC 8.24 11/09/2024    HGB 12.6 11/09/2024    HCT 39.2 11/09/2024    MCV 87 11/09/2024     11/09/2024       CMP  Sodium   Date Value Ref Range Status   11/09/2024 143 136 - 145 mmol/L Final     Potassium   Date Value Ref Range Status   11/09/2024 3.5 3.5 - 5.1 mmol/L Final     Chloride   Date Value Ref Range Status   11/09/2024 110 95 - 110 mmol/L Final     CO2   Date Value Ref Range Status   11/09/2024 25 23 - 29 mmol/L Final     Glucose   Date Value Ref Range Status   11/09/2024 88 70 - 110 mg/dL Final     BUN   Date Value Ref Range Status   11/09/2024 14 8 - 23 mg/dL Final     Creatinine   Date Value Ref Range Status   11/09/2024 0.8 0.5 - 1.4 mg/dL Final     Calcium   Date Value Ref Range Status   11/09/2024 8.6 (L) 8.7 - 10.5 mg/dL Final     Total Protein   Date Value Ref Range Status "   2024 7.0 6.0 - 8.4 g/dL Final     Albumin   Date Value Ref Range Status   2024 4.1 3.5 - 5.2 g/dL Final     Total Bilirubin   Date Value Ref Range Status   2024 0.8 0.1 - 1.0 mg/dL Final     Comment:     For infants and newborns, interpretation of results should be based  on gestational age, weight and in agreement with clinical  observations.    Premature Infant recommended reference ranges:  Up to 24 hours.............<8.0 mg/dL  Up to 48 hours............<12.0 mg/dL  3-5 days..................<15.0 mg/dL  6-29 days.................<15.0 mg/dL       Alkaline Phosphatase   Date Value Ref Range Status   2024 87 40 - 150 U/L Final     AST   Date Value Ref Range Status   2024 17 10 - 40 U/L Final     ALT   Date Value Ref Range Status   2024 16 10 - 44 U/L Final     Anion Gap   Date Value Ref Range Status   2024 8 8 - 16 mmol/L Final     eGFR   Date Value Ref Range Status   2024 >60 >60 mL/min/1.73 m^2 Final         Imagin24 CT A/P w/ iv contrast   FINDINGS:  Heart: Normal in size. No pericardial effusion.     Lung Bases: Well aerated, without consolidation or pleural fluid.     Liver: Normal in size and attenuation, with no focal solid hepatic lesions.  Six Hounsfield unit 2 cm cyst right lobe.     Gallbladder: Resected     Bile Ducts: No evidence of dilated ducts.     Pancreas: No mass or peripancreatic fat stranding.     Spleen: Unremarkable.     Adrenals: Unremarkable.     Kidneys/ Ureters: Unremarkable.     Bladder: No evidence of wall thickening.     Reproductive organs: Unremarkable.     GI Tract/Mesentery: No evidence of bowel obstruction or inflammation.  Fluid structure adjacent to the cysts 3rd portion of the duodenum likely representing benign duodenal diverticulum.     Peritoneal Space: No ascites. No free air.     Retroperitoneum: No significant adenopathy.     Abdominal wall: Unremarkable.     Vasculature: No significant atherosclerosis or  aneurysm.     Bones: No acute fracture.  Spondylosis atypical the more severe at L1-2.      Assessment:   -Epigastric pain/Nausea  -Dysphagia  -Hx of duodenal ulcer  -Hx of H. pylori    Plan:  -Suspect symptoms related to esophagitis. Her dysphagia has likely been worsened by chronic opiate use and suspect she may have had a small food impaction in the lower esophagus/GE junction which ulcerated the area. In addition, the opiates can make her more prone to acid reflux which she may be unaware is occurring. Recommend BID PPI for the next 8 weeks.  -EGD in 6-8 weeks for esophageal dilation  -Consider re-testing for H pylori once off PPI  -Zofran as needed for nausea  -Start with full liquid diet and advance as tolerated  -Once solid food started, recommend small bites and chew thoroughly  -Please feel free to reach out if any questions or concerns.    Patricio Garcia, DO

## 2024-11-09 NOTE — H&P
"  [facility]  History & Physical    Patient Name: Abi Duran  MRN: [id number]  Patient Class: OP- Observation  Admission Date: 11/8/2024  Attending Physician: Urszula Ch MD   Primary Care Provider: Andrew Perales Jr., MD         Patient information was obtained from patient, past medical records, and ER records.     Subjective:     Principal Problem:Acute epigastric pain    Chief Complaint:   Chief Complaint   Patient presents with    Abdominal Pain     Epigastric pain after eating Mexican food last night, nausea. Normal BM. HYPERTENSIVE without diagnosis.        HPI: Abi Duran is a 69 year old female with a past medical history of CAD, GERD, osteoarthritis, hypertension, duodenal ulcer with H pylori 17 years ago, and right knee total arthroplasty manipulation on 09/2024 who presents with complaints epigastric pain associated with nausea after eating Mexican food several hours prior to arrival. She reports after eating Mexican food which included Mexican beans she went to bed then developed severe epigastric pain. She states since then she has only been able to eat crackers and drank water with no relief.  She states sometimes when she swallows her pills "feel like they get stuck."  She endorses having 5 normal consistency bowel movements today.  She denies chest pain, shortness of breath, fever, chills, hematuria, melena, hematochezia, or vomiting. 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 CBC unremarkable.  BMP with hypokalemia 3.4 otherwise unremarkable.  Initial troponin negative and ECG with normal sinus rhythm.  Urinalysis unremarkable.  She was given a GI cocktail in ED with no relief of intractable abdominal pain.  GI consult was placed.  She will be admitted to hospital medicine for further " management and treatment.                 Past Medical History:   Diagnosis Date    Allergy     Arthritis     Coronary artery disease     GERD (gastroesophageal reflux disease)     Hypertension     Ulcer        Past Surgical History:   Procedure Laterality Date    CARDIAC SURGERY      angiogram    CHOLECYSTECTOMY      EYE SURGERY      lasik    HYSTERECTOMY      KNEE JOINT MANIPULATION Right 9/10/2024    Procedure: MANIPULATION, KNEE;  Surgeon: Rod Orellana MD;  Location: [facility];  Service: Orthopedics;  Laterality: Right;    KNEE SURGERY      scope    ROBOTIC ARTHROPLASTY, KNEE Right 6/25/2024    Procedure: ROBOTIC ARTHROPLASTY, KNEE, TOTAL;  Surgeon: Rod Orellana MD;  Location: [facility];  Service: Orthopedics;  Laterality: Right;  NO SPINAL !!// cruzito    TONSILLECTOMY, ADENOIDECTOMY, BILATERAL MYRINGOTOMY AND TUBES      TUBAL LIGATION         Review of patient's allergies indicates:   Allergen Reactions    Oxycodone Swelling     Other reaction(s):swelling of face  Other reaction(s): Flushing (skin)    Amoxicillin Nausea Only     Cannot tolerate side effects    Doxycycline Nausea Only    Erythromycin Nausea Only     Other reaction(s): Nausea    Imitrex [sumatriptan succinate] Swelling and Palpitations     Other reaction(s): swelling  Other reaction(s): chest pains    Phenergan [promethazine] Other (See Comments)     Other reaction(s): Muscle pain  Muscle pain    Sulfa (sulfonamide antibiotics) Nausea And Vomiting       No current facility-administered medications on file prior to encounter.     Current Outpatient Medications on File Prior to Encounter   Medication Sig    HYDROcodone-acetaminophen (NORCO)  mg per tablet Take 1 tablet by mouth every 4 (four) hours as needed for Pain.    BORON ORAL Take by mouth.    gabapentin (NEURONTIN) 300 MG capsule Take 1 capsule (300 mg total) by mouth 3 (three) times daily.    HYDROcodone-acetaminophen (NORCO)  mg per tablet Take 1 tablet by  mouth every 4 (four) hours as needed for Pain.    ondansetron (ZOFRAN) 4 MG tablet Take 1 tablet (4 mg total) by mouth every 6 (six) hours as needed for Nausea.    ondansetron (ZOFRAN) 8 MG tablet Take 1 tablet (8 mg total) by mouth every 8 (eight) hours as needed.    UNABLE TO FIND Take 1 capsule by mouth Daily. medication name:NMN supplement     Family History       Problem Relation (Age of Onset)    Blindness Maternal Grandmother, Paternal Grandmother    Breast cancer Maternal Grandmother, Paternal Grandmother    Cataracts Maternal Grandmother, Paternal Grandmother    Diabetes Paternal Grandmother    Emphysema Father    Heart attack Father, Sister, Maternal Grandmother, Paternal Grandmother    Heart failure Mother    Hypertension Mother    Lung cancer Father    Macular degeneration Mother    Stroke Mother          Tobacco Use    Smoking status: Never    Smokeless tobacco: Never   Substance and Sexual Activity    Alcohol use: No    Drug use: No    Sexual activity: Yes     Partners: Male     Review of Systems   Constitutional:  Positive for appetite change. Negative for activity change, chills, diaphoresis, fatigue, fever and unexpected weight change.   Respiratory:  Negative for cough.    Cardiovascular:  Negative for chest pain and leg swelling.   Gastrointestinal:  Positive for abdominal pain (epigastric) and nausea. Negative for abdominal distention, blood in stool, constipation and vomiting.   Genitourinary:  Negative for difficulty urinating, flank pain and hematuria.   Musculoskeletal:  Positive for arthralgias (right knee). Negative for back pain.   Neurological:  Negative for dizziness, seizures, syncope, facial asymmetry, speech difficulty, light-headedness, numbness and headaches.     Objective:     Vital Signs (Most Recent):  Temp: 97.2 °F (36.2 °C) (11/09/24 0231)  Pulse: 84 (11/09/24 0239)  Resp: 18 (11/09/24 0231)  BP: (!) 182/88 (11/09/24 0232)  SpO2: 97 % (11/09/24 0232) Vital Signs (24h  Range):  Temp:  [97.2 °F (36.2 °C)-97.5 °F (36.4 °C)] 97.2 °F (36.2 °C)  Pulse:  [77-95] 84  Resp:  [16-20] 18  SpO2:  [93 %-100 %] 97 %  BP: (152-231)/() 182/88     Weight: 76.4 kg (168 lb 6.9 oz)  Body mass index is 30.81 kg/m².     Physical Exam  Vitals and nursing note reviewed.   Constitutional:       Appearance: She is not ill-appearing.   Eyes:      Pupils: Pupils are equal, round, and reactive to light.   Cardiovascular:      Rate and Rhythm: Normal rate and regular rhythm.      Pulses: Normal pulses.      Heart sounds: Normal heart sounds.   Pulmonary:      Effort: Pulmonary effort is normal.      Breath sounds: Normal breath sounds.   Abdominal:      General: Bowel sounds are normal. There is no distension.      Palpations: Abdomen is soft.      Tenderness: There is abdominal tenderness in the epigastric area. There is no guarding (epigastric).   Musculoskeletal:      Right lower leg: No edema.      Left lower leg: No edema.   Skin:     General: Skin is warm and dry.      Capillary Refill: Capillary refill takes less than 2 seconds.   Neurological:      Mental Status: She is alert and oriented to person, place, and time.      GCS: GCS eye subscore is 4. GCS verbal subscore is 5. GCS motor subscore is 6.              CRANIAL NERVES     CN III, IV, VI   Pupils are equal, round, and reactive to light.       Significant Labs: All pertinent labs within the past 24 hours have been reviewed.    Bilirubin:   Recent Labs   Lab 11/08/24 2135   BILITOT 0.8       BMP:   Recent Labs   Lab 11/08/24 2135         K 3.4*      CO2 23   BUN 15   CREATININE 0.9   CALCIUM 9.7     CBC:   Recent Labs   Lab 11/08/24 2050   WBC 9.34   HGB 14.3   HCT 43.8        CMP:   Recent Labs   Lab 11/08/24 2135      K 3.4*      CO2 23      BUN 15   CREATININE 0.9   CALCIUM 9.7   PROT 7.0   ALBUMIN 4.1   BILITOT 0.8   ALKPHOS 87   AST 17   ALT 16   ANIONGAP 12       Lipase:   Recent Labs    Lab 11/08/24  2135   LIPASE 27       Troponin:   Recent Labs   Lab 11/08/24 2135   TROPONINI <0.006       Urine Studies:   Recent Labs   Lab 11/08/24  2156   COLORU Yellow   APPEARANCEUA Clear   PHUR 6.0   SPECGRAV 1.020   PROTEINUA Trace*   GLUCUA Negative   KETONESU Trace*   BILIRUBINUA Negative   OCCULTUA Negative   NITRITE Negative   UROBILINOGEN Negative   LEUKOCYTESUR Negative       Significant Imaging: I have reviewed all pertinent imaging results/findings within the past 24 hours.    CT Abdomen Pelvis With IV Contrast NO Oral Contrast  Order: [id number]  Status: Final result       Visible to patient: Yes (not seen)       Next appt: 12/05/2024 at 09:45 AM in Orthopedics (Rod Orellana MD)    0 Result Notes  Details    Reading Physician Reading Date Result Priority   Kostas Gibson MD  [phone] 11/8/2024 STAT     Narrative & Impression  EXAMINATION:  CT ABDOMEN PELVIS WITH IV CONTRAST     CLINICAL HISTORY:  Epigastric pain;     TECHNIQUE:  Low dose axial images, sagittal and coronal reformations were obtained from the lung bases to the pubic symphysis following the IV administration of 75 mL of Omnipaque 350 .  Oral contrast was not given.     COMPARISON:  None     FINDINGS:  Heart: Normal in size. No pericardial effusion.     Lung Bases: Well aerated, without consolidation or pleural fluid.     Liver: Normal in size and attenuation, with no focal solid hepatic lesions.  Six Hounsfield unit 2 cm cyst right lobe.     Gallbladder: Resected     Bile Ducts: No evidence of dilated ducts.     Pancreas: No mass or peripancreatic fat stranding.     Spleen: Unremarkable.     Adrenals: Unremarkable.     Kidneys/ Ureters: Unremarkable.     Bladder: No evidence of wall thickening.     Reproductive organs: Unremarkable.     GI Tract/Mesentery: No evidence of bowel obstruction or inflammation.  Fluid structure adjacent to the cysts 3rd portion of the duodenum likely representing benign duodenal  diverticulum.     Peritoneal Space: No ascites. No free air.     Retroperitoneum: No significant adenopathy.     Abdominal wall: Unremarkable.     Vasculature: No significant atherosclerosis or aneurysm.     Bones: No acute fracture.  Spondylosis atypical the more severe at L1-2.     Impression:     No acute findings evident within the abdomen or pelvis.     Nonacute findings as described above.        Electronically signed by:Kostas Gibson  Date:                                            11/08/2024  Time:                                           23:31        Exam Ended: 11/08/24 22:24 CST Last Resulted: 11/08/24 23:31 CST     Assessment/Plan:     * Acute epigastric pain  -CT abdomen no acute findings but with possible benign duodenal diverticulum   -intractable abdominal pain control with IV morphine  -GI consult placed  -antiemetics p.r.n.  -stool studies pending        History of total knee arthroplasty, right  -right total knee arthroplasty with manipulation 09/2024  -taking opiates and Zofran since procedure  -denies NSAIDs      Hypertension  Patients blood pressure range in the last 24 hours was: BP  Min: 152/93  Max: 231/110.The patient's inpatient anti-hypertensive regimen is listed below:  Current Antihypertensives  hydrALAZINE injection 10 mg, Every 6 hours PRN, Intravenous    Plan  - BP is uncontrolled, will adjust as follows:  P.r.n. IV antihypertensive  -pain control with analgesics p.r.n.      VTE Risk Mitigation (From admission, onward)           Ordered     IP VTE HIGH RISK PATIENT  Once         11/09/24 0118     Place sequential compression device  Until discontinued         11/09/24 0118                                  Yolette Wu DNP  Department of Hospital Medicine  [facility]/Surg

## 2024-11-09 NOTE — PLAN OF CARE
Patient cleared for discharge from case management standpoint.    Patient to contact PCP and GI for hospital follow up due to office closed.       11/09/24 1214   Final Note   Assessment Type Final Discharge Note   Anticipated Discharge Disposition Home   Hospital Resources/Appts/Education Provided Provided patient/caregiver with written discharge plan information;Provided education on problems/symptoms using teachback;Appointment suggestion unavailable   Post-Acute Status   Post-Acute Authorization Other   Other Status No Post-Acute Service Needs   Discharge Delays None known at this time

## 2024-11-09 NOTE — ASSESSMENT & PLAN NOTE
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  -stool studies pending

## 2024-11-09 NOTE — PT/OT/SLP EVAL
Celeste Mazariegos is a , 39w5d gestation.    KB: 2019    Reason for visit: c/o SROM at 0145 at home, clear fluids.    Postpartum Hemorrhage Risk: Low (19 0315)    Postpartum Depression Screen:  Total depressive symptoms score (PHQ9): : 2     GBS:   CULTURE   Date Value Ref Range Status   2019   Final    NEGATIVE FOR STREPTOCOCCUS AGALACTIAE (STREP GROUP B)       HEP B Surface AG (no units)   Date Value   10/24/2018 NEGATIVE       Blood Type:   ABO/RH(D) (no units)   Date Value   10/24/2018 O NEGATIVE     ANTIBODY SCREEN (no units)   Date Value   02/15/2019 NEGATIVE       Orders received: MARY Ren RN. 2019, 4:56 AM.   Occupational Therapy   Evaluation and Discharge Note    Name: Abi Duran  MRN: [id number]  Admitting Diagnosis: Acute epigastric pain  Recent Surgery: * No surgery found *      Recommendations:     Discharge Recommendations: No Therapy Indicated  Discharge Equipment Recommendations: none  Barriers to discharge:  None    Assessment:     Abi Duran is a 69 y.o. female with a medical diagnosis of Acute epigastric pain. At this time, patient is modified independent with ADLs. No R knee buckling with ambulation. Patient does not require further acute OT services.     Plan:     During this hospitalization, patient does not require further acute OT services.  Please re-consult if situation changes.    Plan of Care Reviewed with: patient, son    Subjective     Chief Complaint: none  Patient/Family Comments/goals: none    Occupational Profile:  Living Environment: Patient lives with  in a [facility].   Previous level of function: Patient was Mod I with ADLs. Patient was ambulatory using RW.   Equipment Used at home: walker, rolling, wheelchair, raised toilet, shower chair  Assistance upon Discharge: Patient will receive assistance from .     Pain/Comfort:  Pain Rating 1: 0/10  Pain Rating Post-Intervention 1: 0/10    Patients cultural, spiritual, [employer] conflicts given the current situation: no    Objective:     Communicated with: nurse Kevin prior to session.  Patient found HOB elevated with peripheral IV, telemetry upon OT entry to room.    General Precautions: Standard, fall  Orthopedic Precautions: N/A  Braces: N/A  Respiratory Status: Room air     Occupational Performance:    Bed Mobility:    Patient completed Scooting/Bridging with stand by assistance  Patient completed Supine to Sit with stand by assistance    Functional Mobility/Transfers:  Patient completed Sit <> Stand Transfer with stand by assistance  with  rolling walker   Patient completed Toilet Transfer Stand Pivot technique with  supervision with  rolling walker    Activities of Daily Living:  Grooming: modified independence with oral and facial hygiene standing at sink  Lower Body Dressing: modified independence to don/doff socks seated EOB  Toileting: modified independence     Cognitive/Visual Perceptual:  Cognitive/Psychosocial Skills:     -       Oriented to: Person, Place, Time, and Situation   -       Follows Commands/attention:Follows multistep  commands  -       Communication: clear/fluent  -       Safety awareness/insight to disability: intact   -       Mood/Affect/Coping skills/emotional control: Appropriate to situation and Cooperative  Visual/Perceptual:      -Intact     Physical Exam:  Postural examination/scapula alignment:    -       Rounded shoulders  -       Forward head  Upper Extremity Range of Motion:     -       Right Upper Extremity: WFL  -       Left Upper Extremity: WFL  Upper Extremity Strength:    -       Right Upper Extremity: WFL  -       Left Upper Extremity: WFL   Strength:    -       Right Upper Extremity: WFL  -       Left Upper Extremity: WFL  Fine Motor Coordination:    -       Intact  Gross motor coordination:   WFL in BUE    AMPAC 6 Click ADL:  AMPAC Total Score: 24    Treatment & Education:  Pt educated on role of OT/POC, importance of time EOB/OOB, safety with ADLs, importance of intermittent mobility, safe techniques for transfers/ambulation, discharge recommendations/options, and use of call light for assistance and fall prevention.       Patient left up in chair with all lines intact, call button in reach, chair alarm on, and son present    GOALS:   Multidisciplinary Problems       Occupational Therapy Goals       Not on file                    History:     Past Medical History:   Diagnosis Date    Allergy     Arthritis     Coronary artery disease     GERD (gastroesophageal reflux disease)     Hypertension     Ulcer          Past Surgical History:   Procedure Laterality Date    CARDIAC SURGERY       angiogram    CHOLECYSTECTOMY      EYE SURGERY      lasik    HYSTERECTOMY      KNEE JOINT MANIPULATION Right 9/10/2024    Procedure: MANIPULATION, KNEE;  Surgeon: Rod Orellana MD;  Location: [facility] OR;  Service: Orthopedics;  Laterality: Right;    KNEE SURGERY      scope    ROBOTIC ARTHROPLASTY, KNEE Right 6/25/2024    Procedure: ROBOTIC ARTHROPLASTY, KNEE, TOTAL;  Surgeon: Rod rOellana MD;  Location: [facility];  Service: Orthopedics;  Laterality: Right;  NO SPINAL !!// cruzito    TONSILLECTOMY, ADENOIDECTOMY, BILATERAL MYRINGOTOMY AND TUBES      TUBAL LIGATION         Time Tracking:     OT Date of Treatment: 11/09/24  OT Start Time: 0857  OT Stop Time: 0925  OT Total Time (min): 28 min    Billable Minutes:Evaluation 5  Self Care/Home Management 23 11/9/2024

## 2024-11-09 NOTE — ASSESSMENT & PLAN NOTE
Patients blood pressure range in the last 24 hours was: BP  Min: 152/93  Max: 231/110.The patient's inpatient anti-hypertensive regimen is listed below:  Current Antihypertensives  hydrALAZINE injection 10 mg, Every 6 hours PRN, Intravenous    Plan  - BP is uncontrolled, will adjust as follows:  P.r.n. IV antihypertensive  -pain control with analgesics p.r.n.

## 2024-11-09 NOTE — PLAN OF CARE
11/09/24 1000   BONE Message   Medicare Outpatient and Observation Notification regarding financial responsibility Explained to patient/caregiver;Signed/date by patient/caregiver   Date BONE was signed 11/09/24   Time BONE was signed 1000

## 2024-11-10 LAB
BASOPHILS # BLD AUTO: 0.03 K/UL (ref 0–0.2)
BASOPHILS NFR BLD: 0.4 % (ref 0–1.9)
DIFFERENTIAL METHOD BLD: NORMAL
EOSINOPHIL # BLD AUTO: 0.2 K/UL (ref 0–0.5)
EOSINOPHIL NFR BLD: 2.2 % (ref 0–8)
ERYTHROCYTE [DISTWIDTH] IN BLOOD BY AUTOMATED COUNT: 12.9 % (ref 11.5–14.5)
HCT VFR BLD AUTO: 39 % (ref 37–48.5)
HGB BLD-MCNC: 12.5 G/DL (ref 12–16)
IMM GRANULOCYTES # BLD AUTO: 0.01 K/UL (ref 0–0.04)
IMM GRANULOCYTES NFR BLD AUTO: 0.1 % (ref 0–0.5)
LYMPHOCYTES # BLD AUTO: 2.1 K/UL (ref 1–4.8)
LYMPHOCYTES NFR BLD: 31 % (ref 18–48)
MCH RBC QN AUTO: 28.1 PG (ref 27–31)
MCHC RBC AUTO-ENTMCNC: 32.1 G/DL (ref 32–36)
MCV RBC AUTO: 88 FL (ref 82–98)
MONOCYTES # BLD AUTO: 0.6 K/UL (ref 0.3–1)
MONOCYTES NFR BLD: 9.3 % (ref 4–15)
NEUTROPHILS # BLD AUTO: 3.9 K/UL (ref 1.8–7.7)
NEUTROPHILS NFR BLD: 57 % (ref 38–73)
NRBC BLD-RTO: 0 /100 WBC
PLATELET # BLD AUTO: 261 K/UL (ref 150–450)
PMV BLD AUTO: 10.5 FL (ref 9.2–12.9)
RBC # BLD AUTO: 4.45 M/UL (ref 4–5.4)
WBC # BLD AUTO: 6.77 K/UL (ref 3.9–12.7)

## 2024-11-10 PROCEDURE — 99232 SBSQ HOSP IP/OBS MODERATE 35: CPT | Mod: ,,, | Performed by: STUDENT IN AN ORGANIZED HEALTH CARE EDUCATION/TRAINING PROGRAM

## 2024-11-10 PROCEDURE — 63600175 PHARM REV CODE 636 W HCPCS: Performed by: NURSE PRACTITIONER

## 2024-11-10 PROCEDURE — 11000001 HC ACUTE MED/SURG PRIVATE ROOM

## 2024-11-10 PROCEDURE — 36415 COLL VENOUS BLD VENIPUNCTURE: CPT

## 2024-11-10 PROCEDURE — 25000003 PHARM REV CODE 250: Performed by: HOSPITALIST

## 2024-11-10 PROCEDURE — 63600175 PHARM REV CODE 636 W HCPCS: Performed by: HOSPITALIST

## 2024-11-10 PROCEDURE — 85025 COMPLETE CBC W/AUTO DIFF WBC: CPT

## 2024-11-10 PROCEDURE — 63600175 PHARM REV CODE 636 W HCPCS

## 2024-11-10 PROCEDURE — 25000003 PHARM REV CODE 250

## 2024-11-10 RX ORDER — ONDANSETRON HYDROCHLORIDE 2 MG/ML
8 INJECTION, SOLUTION INTRAVENOUS EVERY 6 HOURS PRN
Status: DISCONTINUED | OUTPATIENT
Start: 2024-11-10 | End: 2024-11-12 | Stop reason: HOSPADM

## 2024-11-10 RX ORDER — DICYCLOMINE HYDROCHLORIDE 10 MG/1
10 CAPSULE ORAL ONCE
Status: COMPLETED | OUTPATIENT
Start: 2024-11-10 | End: 2024-11-10

## 2024-11-10 RX ORDER — METOCLOPRAMIDE HYDROCHLORIDE 5 MG/ML
5 INJECTION INTRAMUSCULAR; INTRAVENOUS ONCE
Status: COMPLETED | OUTPATIENT
Start: 2024-11-10 | End: 2024-11-10

## 2024-11-10 RX ORDER — ONDANSETRON HYDROCHLORIDE 2 MG/ML
4 INJECTION, SOLUTION INTRAVENOUS ONCE
Status: COMPLETED | OUTPATIENT
Start: 2024-11-10 | End: 2024-11-10

## 2024-11-10 RX ADMIN — SUCRALFATE 1 G: 1 SUSPENSION ORAL at 05:11

## 2024-11-10 RX ADMIN — SENNOSIDES AND DOCUSATE SODIUM 1 TABLET: 50; 8.6 TABLET ORAL at 04:11

## 2024-11-10 RX ADMIN — MORPHINE SULFATE 4 MG: 4 INJECTION INTRAVENOUS at 04:11

## 2024-11-10 RX ADMIN — DICYCLOMINE HYDROCHLORIDE 10 MG: 10 CAPSULE ORAL at 06:11

## 2024-11-10 RX ADMIN — ONDANSETRON 4 MG: 2 INJECTION INTRAMUSCULAR; INTRAVENOUS at 10:11

## 2024-11-10 RX ADMIN — SUCRALFATE 1 G: 1 SUSPENSION ORAL at 12:11

## 2024-11-10 RX ADMIN — ONDANSETRON 4 MG: 2 INJECTION INTRAMUSCULAR; INTRAVENOUS at 04:11

## 2024-11-10 RX ADMIN — SUCRALFATE 1 G: 1 SUSPENSION ORAL at 11:11

## 2024-11-10 RX ADMIN — SUCRALFATE 1 G: 1 SUSPENSION ORAL at 06:11

## 2024-11-10 RX ADMIN — HYDRALAZINE HYDROCHLORIDE 10 MG: 20 INJECTION, SOLUTION INTRAMUSCULAR; INTRAVENOUS at 10:11

## 2024-11-10 RX ADMIN — PANTOPRAZOLE SODIUM 40 MG: 40 INJECTION, POWDER, FOR SOLUTION INTRAVENOUS at 09:11

## 2024-11-10 RX ADMIN — MORPHINE SULFATE 4 MG: 4 INJECTION INTRAVENOUS at 10:11

## 2024-11-10 RX ADMIN — HYDRALAZINE HYDROCHLORIDE 10 MG: 20 INJECTION, SOLUTION INTRAMUSCULAR; INTRAVENOUS at 12:11

## 2024-11-10 RX ADMIN — POTASSIUM BICARBONATE 50 MEQ: 977.5 TABLET, EFFERVESCENT ORAL at 09:11

## 2024-11-10 RX ADMIN — METOCLOPRAMIDE 5 MG: 5 INJECTION, SOLUTION INTRAMUSCULAR; INTRAVENOUS at 10:11

## 2024-11-10 RX ADMIN — ONDANSETRON 8 MG: 2 INJECTION INTRAMUSCULAR; INTRAVENOUS at 06:11

## 2024-11-10 RX ADMIN — HYDRALAZINE HYDROCHLORIDE 10 MG: 20 INJECTION, SOLUTION INTRAMUSCULAR; INTRAVENOUS at 04:11

## 2024-11-10 RX ADMIN — ONDANSETRON 4 MG: 2 INJECTION INTRAMUSCULAR; INTRAVENOUS at 12:11

## 2024-11-10 RX ADMIN — MORPHINE SULFATE 2 MG: 2 INJECTION, SOLUTION INTRAMUSCULAR; INTRAVENOUS at 06:11

## 2024-11-10 NOTE — PLAN OF CARE
Problem: Adult Inpatient Plan of Care  Goal: Plan of Care Review  Outcome: Progressing     Problem: Infection  Goal: Absence of Infection Signs and Symptoms  Outcome: Progressing

## 2024-11-10 NOTE — PLAN OF CARE
Abi Duran has warranted treatment spanning two or more midnights of hospital level care for the management of  Epigastric pain . She continues to require further testing/imaging, monitoring of vital signs, advancing diet, medication adjustments, further evaluation by consultants, and IV medication . Her condition is also complicated by the following comorbidities: Coronary Artery Disease, Hypertension, and GERD .

## 2024-11-10 NOTE — PLAN OF CARE
[facility]/Surg  Discharge Reassessment    Primary Care Provider: Andrew Perales Jr., MD    Expected Discharge Date: 11/11/2024    Per MDRs, patient not medically ready for discharge today possible tomorrow due to reports continued severe nausea. Reports some increase in abdominal pain though it is still present. Patient states she does not feel improved enough to go home. Tolerating minimal p.o. intake. Discussed with GI MD, plan for EGD tomorrow.     Reassessment (most recent)       Discharge Reassessment - 11/10/24 1207          Discharge Reassessment    Assessment Type Discharge Planning Reassessment     Did the patient's condition or plan change since previous assessment? Yes     Discharge Plan discussed with: Patient     Communicated JUAN M with patient/caregiver Yes     Discharge Plan A Home     Discharge Plan B Home with family     DME Needed Upon Discharge  none     Transition of Care Barriers None     Why the patient remains in the hospital Requires continued medical care        Post-Acute Status    Post-Acute Authorization Other     Other Status No Post-Acute Service Needs     Discharge Delays None known at this time

## 2024-11-10 NOTE — PROGRESS NOTES
"Writer educated pt about her high blood pressure and that she can take hydralazine for it and pt refused stating " I do not have high blood pressure, it just because I am in pain. "Every time I am in the hospital for pain my blood pressure goes up."  "

## 2024-11-10 NOTE — ASSESSMENT & PLAN NOTE
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  -b.i.d. PPI.  Q.i.d. Carafate

## 2024-11-10 NOTE — PT/OT/SLP PROGRESS
Physical Therapy      Patient Name:  Abi Duran   MRN:  [id number]    Patient not seen today secondary to Patient unwilling to participate (nurse informed , patient complained of nausea.). Will follow-up 11/11/24.

## 2024-11-10 NOTE — PROGRESS NOTES
"[facility] Medicine  Progress Note    Patient Name: Abi Duran  MRN: [id number]  Patient Class: OP- Observation   Admission Date: 11/8/2024  Length of Stay: 0 days  Attending Physician: Urszula Ch MD  Primary Care Provider: Andrew Perales Jr., MD        Subjective:     Principal Problem:Acute epigastric pain        HPI:  Abi Duran is a 69 year old female with a past medical history of CAD, GERD, osteoarthritis, hypertension, duodenal ulcer with H pylori 17 years ago, and right knee total arthroplasty manipulation on 09/2024 who presents with complaints epigastric pain associated with nausea after eating Mexican food several hours prior to arrival. She reports after eating Mexican food which included Mexican beans she went to bed then developed severe epigastric pain. She states since then she has only been able to eat crackers and drank water with no relief.  She states sometimes when she swallows her pills "feel like they get stuck."  She endorses having 5 normal consistency bowel movements today.  She denies chest pain, shortness of breath, fever, chills, hematuria, melena, hematochezia, or vomiting. 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 CBC unremarkable.  BMP with hypokalemia 3.4 otherwise unremarkable.  Initial troponin negative and ECG with normal sinus rhythm.  Urinalysis unremarkable.  She was given a GI cocktail in ED with no relief of intractable abdominal pain.  GI consult was placed.  She will be admitted to hospital medicine for further management and treatment.                 Overview/Hospital Course:  No notes on file    Interval History:  Patient seen and examined.  Reports continued severe nausea.  Reports some increase in abdominal pain though it is still present.  She states she does " not feel improved enough to go home.  Tolerating minimal p.o. intake.  Discussed with GI MD, plan for EGD tomorrow.    Review of Systems   Gastrointestinal:  Positive for abdominal pain and nausea. Negative for vomiting.     Objective:     Vital Signs (Most Recent):  Temp: 98 °F (36.7 °C) (11/10/24 0833)  Pulse: 81 (11/10/24 0833)  Resp: 17 (11/10/24 1049)  BP: (!) 173/81 (11/10/24 0833)  SpO2: 100 % (11/10/24 0833) Vital Signs (24h Range):  Temp:  [97.5 °F (36.4 °C)-98 °F (36.7 °C)] 98 °F (36.7 °C)  Pulse:  [68-91] 81  Resp:  [15-20] 17  SpO2:  [96 %-100 %] 100 %  BP: (154-187)/(73-98) 173/81     Weight: 76.4 kg (168 lb 6.9 oz)  Body mass index is 30.81 kg/m².    Intake/Output Summary (Last 24 hours) at 11/10/2024 1121  Last data filed at 11/10/2024 0440  Gross per 24 hour   Intake 600 ml   Output 800 ml   Net -200 ml         Physical Exam  Constitutional:       General: She is not in acute distress.     Appearance: She is well-developed.   HENT:      Head: Normocephalic and atraumatic.   Eyes:      Pupils: Pupils are equal, round, and reactive to light.   Cardiovascular:      Rate and Rhythm: Normal rate and regular rhythm.      Heart sounds: No murmur heard.  Pulmonary:      Effort: Pulmonary effort is normal. No respiratory distress.      Breath sounds: Normal breath sounds. No wheezing or rales.   Abdominal:      General: Bowel sounds are normal. There is no distension.      Palpations: Abdomen is soft.      Tenderness: There is abdominal tenderness (Epigastric).   Musculoskeletal:         General: Normal range of motion.   Skin:     General: Skin is warm and dry.      Findings: No rash.   Neurological:      Mental Status: She is alert and oriented to person, place, and time.      Cranial Nerves: No cranial nerve deficit.   Psychiatric:         Behavior: Behavior normal.             Significant Labs: All pertinent labs within the past 24 hours have been reviewed.    Significant Imaging: I have reviewed all  pertinent imaging results/findings within the past 24 hours.    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  -b.i.d. PPI.  Q.i.d. Carafate        History of total knee arthroplasty, right  -right total knee arthroplasty with manipulation 09/2024  -taking opiates and Zofran since procedure  -denies NSAIDs      Hypertension  Patients blood pressure range in the last 24 hours was: BP  Min: 152/93  Max: 231/110.The patient's inpatient anti-hypertensive regimen is listed below:  Current Antihypertensives  hydrALAZINE injection 10 mg, Every 6 hours PRN, Intravenous    Plan  - BP is uncontrolled, will adjust as follows:  P.r.n. IV antihypertensive  -pain control with analgesics p.r.n.

## 2024-11-10 NOTE — PROGRESS NOTES
"[facility] Note    CC: epigastric pain    HPI   Patient seen and examined. Reports she was having some improvement yesterday and this morning until she tried eating some pudding. Reports this re-started her pain and brought it back to what it was yesterday. Still feels like having a tough time with food going down her esophagus.    Past Medical History:   Diagnosis Date    Allergy     Arthritis     Coronary artery disease     GERD (gastroesophageal reflux disease)     Hypertension     Ulcer          Review of Systems  General ROS: negative for - chills, fever   Cardiovascular ROS: no dyspnea on exertion  Gastrointestinal ROS: reports epigastric pain and dysphagia    Physical Examination  BP (!) 190/90   Pulse 93   Temp 98 °F (36.7 °C) (Oral)   Resp 18   Ht 5' 2" (1.575 m)   Wt 76.4 kg (168 lb 6.9 oz)   SpO2 97%   BMI 30.81 kg/m²   General appearance: alert, cooperative, no distress  Abdomen: soft, non-tender, non-distended      Labs:  Lab Results   Component Value Date    WBC 6.77 11/10/2024    HGB 12.5 11/10/2024    HCT 39.0 11/10/2024    MCV 88 11/10/2024     11/10/2024       CMP  Sodium   Date Value Ref Range Status   11/09/2024 143 136 - 145 mmol/L Final     Potassium   Date Value Ref Range Status   11/09/2024 3.5 3.5 - 5.1 mmol/L Final     Chloride   Date Value Ref Range Status   11/09/2024 110 95 - 110 mmol/L Final     CO2   Date Value Ref Range Status   11/09/2024 25 23 - 29 mmol/L Final     Glucose   Date Value Ref Range Status   11/09/2024 88 70 - 110 mg/dL Final     BUN   Date Value Ref Range Status   11/09/2024 14 8 - 23 mg/dL Final     Creatinine   Date Value Ref Range Status   11/09/2024 0.8 0.5 - 1.4 mg/dL Final     Calcium   Date Value Ref Range Status   11/09/2024 8.6 (L) 8.7 - 10.5 mg/dL Final     Total Protein   Date Value Ref Range Status   11/08/2024 7.0 6.0 - 8.4 g/dL Final     Albumin   Date Value Ref Range Status   11/08/2024 4.1 3.5 - 5.2 g/dL Final     Total " Bilirubin   Date Value Ref Range Status   2024 0.8 0.1 - 1.0 mg/dL Final     Comment:     For infants and newborns, interpretation of results should be based  on gestational age, weight and in agreement with clinical  observations.    Premature Infant recommended reference ranges:  Up to 24 hours.............<8.0 mg/dL  Up to 48 hours............<12.0 mg/dL  3-5 days..................<15.0 mg/dL  6-29 days.................<15.0 mg/dL       Alkaline Phosphatase   Date Value Ref Range Status   2024 87 40 - 150 U/L Final     AST   Date Value Ref Range Status   2024 17 10 - 40 U/L Final     ALT   Date Value Ref Range Status   2024 16 10 - 44 U/L Final     Anion Gap   Date Value Ref Range Status   2024 8 8 - 16 mmol/L Final     eGFR   Date Value Ref Range Status   2024 >60 >60 mL/min/1.73 m^2 Final     Imagin24 CT A/P w/ iv contrast   FINDINGS:  Heart: Normal in size. No pericardial effusion.     Lung Bases: Well aerated, without consolidation or pleural fluid.     Liver: Normal in size and attenuation, with no focal solid hepatic lesions.  Six Hounsfield unit 2 cm cyst right lobe.     Gallbladder: Resected     Bile Ducts: No evidence of dilated ducts.     Pancreas: No mass or peripancreatic fat stranding.     Spleen: Unremarkable.     Adrenals: Unremarkable.     Kidneys/ Ureters: Unremarkable.     Bladder: No evidence of wall thickening.     Reproductive organs: Unremarkable.     GI Tract/Mesentery: No evidence of bowel obstruction or inflammation.  Fluid structure adjacent to the cysts 3rd portion of the duodenum likely representing benign duodenal diverticulum.     Peritoneal Space: No ascites. No free air.     Retroperitoneum: No significant adenopathy.     Abdominal wall: Unremarkable.     Vasculature: No significant atherosclerosis or aneurysm.     Bones: No acute fracture.  Spondylosis atypical the more severe at L1-2.        Assessment:   -Epigastric  pain/Nausea  -Dysphagia  -Hx of duodenal ulcer  -Hx of H. pylori     Plan:  -EGD tomorrow. NPO after midnight.  -Zofran as needed for nausea. Can try reglan or scopalamine patch if zofran ineffective.  -Keep on liquid diet today.     Patricio Garcia, DO

## 2024-11-10 NOTE — SUBJECTIVE & OBJECTIVE
Interval History:  Patient seen and examined.  Reports continued severe nausea.  Reports some increase in abdominal pain though it is still present.  She states she does not feel improved enough to go home.  Tolerating minimal p.o. intake.  Discussed with GI MD, plan for EGD tomorrow.    Review of Systems   Gastrointestinal:  Positive for abdominal pain and nausea. Negative for vomiting.     Objective:     Vital Signs (Most Recent):  Temp: 98 °F (36.7 °C) (11/10/24 0833)  Pulse: 81 (11/10/24 0833)  Resp: 17 (11/10/24 1049)  BP: (!) 173/81 (11/10/24 0833)  SpO2: 100 % (11/10/24 0833) Vital Signs (24h Range):  Temp:  [97.5 °F (36.4 °C)-98 °F (36.7 °C)] 98 °F (36.7 °C)  Pulse:  [68-91] 81  Resp:  [15-20] 17  SpO2:  [96 %-100 %] 100 %  BP: (154-187)/(73-98) 173/81     Weight: 76.4 kg (168 lb 6.9 oz)  Body mass index is 30.81 kg/m².    Intake/Output Summary (Last 24 hours) at 11/10/2024 1121  Last data filed at 11/10/2024 0440  Gross per 24 hour   Intake 600 ml   Output 800 ml   Net -200 ml         Physical Exam  Constitutional:       General: She is not in acute distress.     Appearance: She is well-developed.   HENT:      Head: Normocephalic and atraumatic.   Eyes:      Pupils: Pupils are equal, round, and reactive to light.   Cardiovascular:      Rate and Rhythm: Normal rate and regular rhythm.      Heart sounds: No murmur heard.  Pulmonary:      Effort: Pulmonary effort is normal. No respiratory distress.      Breath sounds: Normal breath sounds. No wheezing or rales.   Abdominal:      General: Bowel sounds are normal. There is no distension.      Palpations: Abdomen is soft.      Tenderness: There is abdominal tenderness (Epigastric).   Musculoskeletal:         General: Normal range of motion.   Skin:     General: Skin is warm and dry.      Findings: No rash.   Neurological:      Mental Status: She is alert and oriented to person, place, and time.      Cranial Nerves: No cranial nerve deficit.   Psychiatric:          Behavior: Behavior normal.             Significant Labs: All pertinent labs within the past 24 hours have been reviewed.    Significant Imaging: I have reviewed all pertinent imaging results/findings within the past 24 hours.

## 2024-11-11 ENCOUNTER — ANESTHESIA EVENT (OUTPATIENT)
Dept: ENDOSCOPY | Facility: HOSPITAL | Age: 69
DRG: 392 | End: 2024-11-11
Payer: MEDICARE

## 2024-11-11 ENCOUNTER — ANESTHESIA (OUTPATIENT)
Dept: ENDOSCOPY | Facility: HOSPITAL | Age: 69
DRG: 392 | End: 2024-11-11
Payer: MEDICARE

## 2024-11-11 LAB
BASOPHILS # BLD AUTO: 0.01 K/UL (ref 0–0.2)
BASOPHILS NFR BLD: 0.1 % (ref 0–1.9)
DIFFERENTIAL METHOD BLD: NORMAL
EOSINOPHIL # BLD AUTO: 0.1 K/UL (ref 0–0.5)
EOSINOPHIL NFR BLD: 1.1 % (ref 0–8)
ERYTHROCYTE [DISTWIDTH] IN BLOOD BY AUTOMATED COUNT: 13 % (ref 11.5–14.5)
HCT VFR BLD AUTO: 39.8 % (ref 37–48.5)
HGB BLD-MCNC: 12.8 G/DL (ref 12–16)
IMM GRANULOCYTES # BLD AUTO: 0.02 K/UL (ref 0–0.04)
IMM GRANULOCYTES NFR BLD AUTO: 0.2 % (ref 0–0.5)
LYMPHOCYTES # BLD AUTO: 1.8 K/UL (ref 1–4.8)
LYMPHOCYTES NFR BLD: 22.4 % (ref 18–48)
MCH RBC QN AUTO: 27.8 PG (ref 27–31)
MCHC RBC AUTO-ENTMCNC: 32.2 G/DL (ref 32–36)
MCV RBC AUTO: 87 FL (ref 82–98)
MONOCYTES # BLD AUTO: 0.7 K/UL (ref 0.3–1)
MONOCYTES NFR BLD: 9 % (ref 4–15)
NEUTROPHILS # BLD AUTO: 5.5 K/UL (ref 1.8–7.7)
NEUTROPHILS NFR BLD: 67.2 % (ref 38–73)
NRBC BLD-RTO: 0 /100 WBC
OHS QRS DURATION: 80 MS
OHS QTC CALCULATION: 440 MS
PLATELET # BLD AUTO: 268 K/UL (ref 150–450)
PMV BLD AUTO: 10.7 FL (ref 9.2–12.9)
RBC # BLD AUTO: 4.6 M/UL (ref 4–5.4)
WBC # BLD AUTO: 8.18 K/UL (ref 3.9–12.7)

## 2024-11-11 PROCEDURE — 43248 EGD GUIDE WIRE INSERTION: CPT | Performed by: STUDENT IN AN ORGANIZED HEALTH CARE EDUCATION/TRAINING PROGRAM

## 2024-11-11 PROCEDURE — 25000003 PHARM REV CODE 250: Performed by: HOSPITALIST

## 2024-11-11 PROCEDURE — 0D758ZZ DILATION OF ESOPHAGUS, VIA NATURAL OR ARTIFICIAL OPENING ENDOSCOPIC: ICD-10-PCS | Performed by: STUDENT IN AN ORGANIZED HEALTH CARE EDUCATION/TRAINING PROGRAM

## 2024-11-11 PROCEDURE — D9220A PRA ANESTHESIA: Mod: CRNA,,, | Performed by: NURSE ANESTHETIST, CERTIFIED REGISTERED

## 2024-11-11 PROCEDURE — 43239 EGD BIOPSY SINGLE/MULTIPLE: CPT | Mod: 59 | Performed by: STUDENT IN AN ORGANIZED HEALTH CARE EDUCATION/TRAINING PROGRAM

## 2024-11-11 PROCEDURE — C1769 GUIDE WIRE: HCPCS | Performed by: STUDENT IN AN ORGANIZED HEALTH CARE EDUCATION/TRAINING PROGRAM

## 2024-11-11 PROCEDURE — 25000003 PHARM REV CODE 250: Performed by: STUDENT IN AN ORGANIZED HEALTH CARE EDUCATION/TRAINING PROGRAM

## 2024-11-11 PROCEDURE — 11000001 HC ACUTE MED/SURG PRIVATE ROOM

## 2024-11-11 PROCEDURE — 43239 EGD BIOPSY SINGLE/MULTIPLE: CPT | Mod: 59,,, | Performed by: STUDENT IN AN ORGANIZED HEALTH CARE EDUCATION/TRAINING PROGRAM

## 2024-11-11 PROCEDURE — 63600175 PHARM REV CODE 636 W HCPCS: Performed by: NURSE ANESTHETIST, CERTIFIED REGISTERED

## 2024-11-11 PROCEDURE — D9220A PRA ANESTHESIA: Mod: ANES,,, | Performed by: ANESTHESIOLOGY

## 2024-11-11 PROCEDURE — 27201012 HC FORCEPS, HOT/COLD, DISP: Performed by: STUDENT IN AN ORGANIZED HEALTH CARE EDUCATION/TRAINING PROGRAM

## 2024-11-11 PROCEDURE — 85025 COMPLETE CBC W/AUTO DIFF WBC: CPT

## 2024-11-11 PROCEDURE — 97116 GAIT TRAINING THERAPY: CPT | Mod: CQ

## 2024-11-11 PROCEDURE — 37000009 HC ANESTHESIA EA ADD 15 MINS: Performed by: STUDENT IN AN ORGANIZED HEALTH CARE EDUCATION/TRAINING PROGRAM

## 2024-11-11 PROCEDURE — 43248 EGD GUIDE WIRE INSERTION: CPT | Mod: ,,, | Performed by: STUDENT IN AN ORGANIZED HEALTH CARE EDUCATION/TRAINING PROGRAM

## 2024-11-11 PROCEDURE — 63600175 PHARM REV CODE 636 W HCPCS

## 2024-11-11 PROCEDURE — 63600175 PHARM REV CODE 636 W HCPCS: Performed by: HOSPITALIST

## 2024-11-11 PROCEDURE — 0DB68ZX EXCISION OF STOMACH, VIA NATURAL OR ARTIFICIAL OPENING ENDOSCOPIC, DIAGNOSTIC: ICD-10-PCS | Performed by: STUDENT IN AN ORGANIZED HEALTH CARE EDUCATION/TRAINING PROGRAM

## 2024-11-11 PROCEDURE — 36415 COLL VENOUS BLD VENIPUNCTURE: CPT

## 2024-11-11 PROCEDURE — 82962 GLUCOSE BLOOD TEST: CPT

## 2024-11-11 PROCEDURE — 37000008 HC ANESTHESIA 1ST 15 MINUTES: Performed by: STUDENT IN AN ORGANIZED HEALTH CARE EDUCATION/TRAINING PROGRAM

## 2024-11-11 RX ORDER — SCOLOPAMINE TRANSDERMAL SYSTEM 1 MG/1
1 PATCH, EXTENDED RELEASE TRANSDERMAL
Status: DISCONTINUED | OUTPATIENT
Start: 2024-11-11 | End: 2024-11-12 | Stop reason: HOSPADM

## 2024-11-11 RX ORDER — DEXTROMETHORPHAN/PSEUDOEPHED 2.5-7.5/.8
DROPS ORAL
Status: DISCONTINUED | OUTPATIENT
Start: 2024-11-11 | End: 2024-11-11 | Stop reason: HOSPADM

## 2024-11-11 RX ORDER — LIDOCAINE HYDROCHLORIDE 20 MG/ML
INJECTION, SOLUTION EPIDURAL; INFILTRATION; INTRACAUDAL; PERINEURAL
Status: DISCONTINUED | OUTPATIENT
Start: 2024-11-11 | End: 2024-11-11

## 2024-11-11 RX ORDER — PROPOFOL 10 MG/ML
VIAL (ML) INTRAVENOUS
Status: DISCONTINUED | OUTPATIENT
Start: 2024-11-11 | End: 2024-11-11

## 2024-11-11 RX ORDER — ONDANSETRON HYDROCHLORIDE 2 MG/ML
INJECTION, SOLUTION INTRAVENOUS
Status: DISCONTINUED | OUTPATIENT
Start: 2024-11-11 | End: 2024-11-11

## 2024-11-11 RX ADMIN — PROPOFOL 100 MG: 10 INJECTION, EMULSION INTRAVENOUS at 10:11

## 2024-11-11 RX ADMIN — ONDANSETRON 4 MG: 2 INJECTION INTRAMUSCULAR; INTRAVENOUS at 10:11

## 2024-11-11 RX ADMIN — PANTOPRAZOLE SODIUM 40 MG: 40 INJECTION, POWDER, FOR SOLUTION INTRAVENOUS at 08:11

## 2024-11-11 RX ADMIN — PROPOFOL 30 MG: 10 INJECTION, EMULSION INTRAVENOUS at 10:11

## 2024-11-11 RX ADMIN — SUCRALFATE 1 G: 1 SUSPENSION ORAL at 12:11

## 2024-11-11 RX ADMIN — ONDANSETRON 8 MG: 2 INJECTION INTRAMUSCULAR; INTRAVENOUS at 01:11

## 2024-11-11 RX ADMIN — LIDOCAINE HYDROCHLORIDE 100 MG: 20 INJECTION, SOLUTION EPIDURAL; INFILTRATION; INTRACAUDAL; PERINEURAL at 10:11

## 2024-11-11 RX ADMIN — MORPHINE SULFATE 2 MG: 2 INJECTION, SOLUTION INTRAMUSCULAR; INTRAVENOUS at 01:11

## 2024-11-11 RX ADMIN — SCOPALAMINE 1 PATCH: 1 PATCH, EXTENDED RELEASE TRANSDERMAL at 01:11

## 2024-11-11 RX ADMIN — ONDANSETRON 8 MG: 2 INJECTION INTRAMUSCULAR; INTRAVENOUS at 08:11

## 2024-11-11 NOTE — PT/OT/SLP PROGRESS
Physical Therapy Treatment    Patient Name:  Abi Duran   MRN:  [id number]    Recommendations:     Discharge Recommendations: Low Intensity Therapy  Discharge Equipment Recommendations: none  Barriers to discharge: None    Assessment:     Abi Duran is a 69 y.o. female admitted with a medical diagnosis of Acute epigastric pain.  She presents with the following impairments/functional limitations: weakness, impaired endurance, gait instability, decreased lower extremity function . Awake, alert, supine in bed, family present.  Agreed to mobilize, reports feeling better.  Had procedure in Endo this a.m.  Sup > sit with CGA.  Sit >stand with rw and CGA.  Ambulated 250' with rw and CGA for safety.  Sat up in chair at bedside.  Demonstrated knowledge of HEP.      Rehab Prognosis: Good; patient would benefit from acute skilled PT services to address these deficits and reach maximum level of function.    Recent Surgery: Procedure(s) (LRB):  EGD (ESOPHAGOGASTRODUODENOSCOPY) (N/A) Day of Surgery    Plan:     During this hospitalization, patient to be seen 6 x/week to address the identified rehab impairments via gait training, therapeutic activities, therapeutic exercises and progress toward the following goals:    Plan of Care Expires:       Subjective     Chief Complaint: occasional nausea  Patient/Family Comments/goals: to return home  Pain/Comfort:  Pain Rating 1: 0/10      Objective:     Communicated with nurse Elizabeth prior to session.  Patient found supine with telemetry upon PT entry to room.     General Precautions: Standard, fall  Orthopedic Precautions:    Braces: N/A  Respiratory Status: Room air     Functional Mobility:  Bed Mobility:     Supine to Sit: contact guard assistance  Transfers:     Sit to Stand:  contact guard assistance with rolling walker  Gait: 250' with rw and CGA      AM-PAC 6 CLICK MOBILITY          Treatment & Education:  Ambulated  with rw and CGA for safety.     Patient left up in chair  with all lines intact, call button in reach, chair alarm on, nurse Elizabeth notified, and family present..    GOALS:   Multidisciplinary Problems       Physical Therapy Goals       Not on file                    Time Tracking:     PT Received On: 11/11/24  PT Start Time: 1324     PT Stop Time: 1336  PT Total Time (min): 12 min     Billable Minutes: Gait Training 12min    Treatment Type: Treatment  PT/PTA: PTA     Number of PTA visits since last PT visit: 1     11/11/2024

## 2024-11-11 NOTE — ASSESSMENT & PLAN NOTE
Patients blood pressure range in the last 24 hours was: BP  Min: 127/58  Max: 231/110.The patient's inpatient anti-hypertensive regimen is listed below:  Current Antihypertensives  hydrALAZINE injection 10 mg, Every 6 hours PRN, Intravenous    Plan  - BP is uncontrolled, will adjust as follows:  P.r.n. IV antihypertensive  -pain control with analgesics p.r.n.

## 2024-11-11 NOTE — TRANSFER OF CARE
"Anesthesia Transfer of Care Note    Patient: Abi Duran    Procedure(s) Performed: Procedure(s) (LRB):  EGD (ESOPHAGOGASTRODUODENOSCOPY) (N/A)    Patient location: PACU    Anesthesia Type: general    Transport from OR: Transported from OR on room air with adequate spontaneous ventilation    Post pain: adequate analgesia    Post assessment: no apparent anesthetic complications and tolerated procedure well    Post vital signs: stable    Level of consciousness: sedated and responds to stimulation    Nausea/Vomiting: no nausea/vomiting    Complications: none    Transfer of care protocol was followed      Last vitals: Visit Vitals  BP (!) 176/84 (BP Location: Left arm, Patient Position: Lying)   Pulse 105   Temp 37 °C (98.6 °F) (Skin)   Resp 20   Ht 5' 2" (1.575 m)   Wt 78.5 kg (173 lb)   SpO2 99%   Breastfeeding No   BMI 31.64 kg/m²     "

## 2024-11-11 NOTE — ANESTHESIA PREPROCEDURE EVALUATION
11/11/2024  Abi Duran is a 69 y.o., female.      Pre-op Assessment    I have reviewed the Patient Summary Reports.     I have reviewed the Nursing Notes. I have reviewed the NPO Status.   I have reviewed the Medications.     Review of Systems  Anesthesia Hx:  No problems with previous Anesthesia             Denies Family Hx of Anesthesia complications.    Denies Personal Hx of Anesthesia complications.                    Hematology/Oncology:       -- Anemia:                                  Cardiovascular:     Hypertension   CAD              ECG has been reviewed.      Coronary Artery Disease:                            Hypertension         Pulmonary:  Pulmonary Normal                       Hepatic/GI:     GERD   N/V, abd pain             Musculoskeletal:  Arthritis               Neurological:      Headaches      Dx of Headaches                           Endocrine:  Endocrine Normal                Physical Exam  General: Cooperative, Alert and Oriented    Airway:  Mallampati: II   Mouth Opening: Normal  TM Distance: Normal  Neck ROM: Normal ROM    Chest/Lungs:  Normal Respiratory Rate    Heart:  Rate: Normal  Rhythm: Regular Rhythm        Anesthesia Plan  Type of Anesthesia, risks & benefits discussed:    Anesthesia Type: Gen Natural Airway  Intra-op Monitoring Plan: Standard ASA Monitors  Induction:  IV  Informed Consent: Informed consent signed with the Patient and all parties understand the risks and agree with anesthesia plan.  All questions answered.   ASA Score: 2    Ready For Surgery From Anesthesia Perspective.     .

## 2024-11-11 NOTE — PLAN OF CARE
Problem: Adult Inpatient Plan of Care  Goal: Absence of Hospital-Acquired Illness or Injury  Outcome: Progressing     Problem: Wound  Goal: Optimal Functional Ability  Outcome: Progressing     Problem: Infection  Goal: Absence of Infection Signs and Symptoms  Outcome: Progressing     Problem: Pain Acute  Goal: Optimal Pain Control and Function  Outcome: Progressing     Problem: Nausea and Vomiting  Goal: Nausea and Vomiting Relief  Outcome: Progressing

## 2024-11-11 NOTE — PLAN OF CARE
Report called to 2nd floor nurse Elizabeth to be transferred to room 217. Condition stable. Transported vis stretcher.

## 2024-11-11 NOTE — PT/OT/SLP PROGRESS
Physical Therapy      Patient Name:  Abi Duran   MRN:  [id number]    Patient not seen today secondary to Off the floor for procedure/surgery. Will follow-up 11/12/24.

## 2024-11-11 NOTE — PROVATION PATIENT INSTRUCTIONS
Discharge Summary/Instructions after an Endoscopic Procedure  Patient Name: Abi Duran  Patient MRN: [id number]  Patient YOB: 1955 Monday, November 11, 2024  Patricio Garcia DO  Dear patient,  As a result of recent federal legislation (The Federal Cures Act), you may   receive lab or pathology results from your procedure in your [facility]   account before your physician is able to contact you. Your physician or   their representative will relay the results to you with their   recommendations at their soonest availability.  Thank you,  RESTRICTIONS:  During your procedure today, you received medications for sedation.  These   medications may affect your judgment, balance and coordination.  Therefore,   for 24 hours, you have the following restrictions:   - DO NOT drive a car, operate machinery, make legal/financial decisions,   sign important papers or drink alcohol.    ACTIVITY:  Today: no heavy lifting, straining or running due to procedural   sedation/anesthesia.  The following day: return to full activity including work.  DIET:  Eat and drink normally unless instructed otherwise.     TREATMENT FOR COMMON SIDE EFFECTS:  - Mild abdominal pain, nausea, belching, bloating or excessive gas:  rest,   eat lightly and use a heating pad.  - Sore Throat: treat with throat lozenges and/or gargle with warm salt   water.  - Because air was used during the procedure, expelling large amounts of air   from your rectum or belching is normal.  - If a bowel prep was taken, you may not have a bowel movement for 1-3 days.    This is normal.  SYMPTOMS TO WATCH FOR AND REPORT TO YOUR PHYSICIAN:  1. Abdominal pain or bloating, other than gas cramps.  2. Chest pain.  3. Back pain.  4. Signs of infection such as: chills or fever occurring within 24 hours   after the procedure.  5. Rectal bleeding, which would show as bright red, maroon, or black stools.   (A tablespoon of blood from the rectum is not serious,  especially if   hemorrhoids are present.)  6. Vomiting.  7. Weakness or dizziness.  GO DIRECTLY TO THE NEAREST EMERGENCY ROOM IF YOU HAVE ANY OF THE FOLLOWING:      Difficulty breathing              Chills and/or fever over 101 F   Persistent vomiting and/or vomiting blood   Severe abdominal pain   Severe chest pain   Black, tarry stools   Bleeding- more than one tablespoon   Any other symptom or condition that you feel may need urgent attention  Your doctor recommends these additional instructions:  If any biopsies were taken, your doctors clinic will contact you in 1 to 2   weeks with any results.  - Return patient to hospital [facility] for ongoing care.   - Advance diet as tolerated.   - Continue present medications.   - Await pathology results.   - Repeat upper endoscopy PRN for retreatment.   - Return to nurse practitioner at appointment to be scheduled.   - Ok for discharge from GI standpoint when tolerating PO intake.  For questions, problems or results please call your physician - Patricio Garcia DO at Work:  [phone].  [facility], EMERGENCY ROOM PHONE NUMBER: [phone]  IF A COMPLICATION OR EMERGENCY SITUATION ARISES AND YOU ARE UNABLE TO REACH   YOUR PHYSICIAN - GO DIRECTLY TO THE EMERGENCY ROOM.  Patricio Garcia DO  11/11/2024 10:33:59 AM  This report has been verified and signed electronically.  Dear patient,  As a result of recent federal legislation (The Federal Cures Act), you may   receive lab or pathology results from your procedure in your [facility]   account before your physician is able to contact you. Your physician or   their representative will relay the results to you with their   recommendations at their soonest availability.  Thank you,  PROVATION

## 2024-11-11 NOTE — ANESTHESIA POSTPROCEDURE EVALUATION
Anesthesia Post Evaluation    Patient: Abi Duran    Procedure(s) Performed: Procedure(s) (LRB):  EGD (ESOPHAGOGASTRODUODENOSCOPY) (N/A)    Final Anesthesia Type: general      Patient location during evaluation: PACU  Patient participation: Yes- Able to Participate  Level of consciousness: sedated and awake  Post-procedure vital signs: reviewed and stable  Pain management: adequate  Airway patency: patent    PONV status at discharge: No PONV  Anesthetic complications: no      Cardiovascular status: hypertensive, blood pressure returned to baseline and hemodynamically stable  Respiratory status: spontaneous ventilation  Hydration status: euvolemic  Follow-up not needed.              Vitals Value Taken Time   /66 11/11/24 1034   Temp 37.2 °C (99 °F) 11/11/24 1029   Pulse 106 11/11/24 1034   Resp 20 11/11/24 1034   SpO2 96 % 11/11/24 1034         No case tracking events are documented in the log.      Pain/Penny Score: Pain Rating Prior to Med Admin: 5 (11/11/2024  1:12 AM)  Pain Rating Post Med Admin: 4 (11/11/2024  1:42 AM)  Penny Score: 8 (11/11/2024 10:31 AM)

## 2024-11-11 NOTE — PLAN OF CARE
11/11/24 1612   Post-Acute Status   Hospital Resources/Appts/Education Provided Appointments scheduled and added to AVS     DC clinic appointment scheduled and added to AVS

## 2024-11-11 NOTE — H&P
History & Physical - Short Stay  Gastroenterology      SUBJECTIVE:     Procedure: EGD    Chief Complaint/Indication for Procedure: Epigastric Pain    PTA Medications   Medication Sig    HYDROcodone-acetaminophen (NORCO)  mg per tablet Take 1 tablet by mouth every 4 (four) hours as needed for Pain.    BORON ORAL Take by mouth.    gabapentin (NEURONTIN) 300 MG capsule Take 1 capsule (300 mg total) by mouth 3 (three) times daily.    HYDROcodone-acetaminophen (NORCO)  mg per tablet Take 1 tablet by mouth every 4 (four) hours as needed for Pain.    ondansetron (ZOFRAN) 4 MG tablet Take 1 tablet (4 mg total) by mouth every 6 (six) hours as needed for Nausea.    ondansetron (ZOFRAN) 8 MG tablet Take 1 tablet (8 mg total) by mouth every 8 (eight) hours as needed.    UNABLE TO FIND Take 1 capsule by mouth Daily. medication name:NMN supplement       Review of patient's allergies indicates:   Allergen Reactions    Amoxicillin Nausea Only     Cannot tolerate side effects    Doxycycline Nausea Only    Erythromycin Nausea Only     Other reaction(s): Nausea    Imitrex [sumatriptan succinate] Swelling and Palpitations     Other reaction(s): swelling  Other reaction(s): chest pains    Phenergan [promethazine] Other (See Comments)     Other reaction(s): Muscle pain  Muscle pain    Sulfa (sulfonamide antibiotics) Nausea And Vomiting        Past Medical History:   Diagnosis Date    Allergy     Arthritis     Coronary artery disease     GERD (gastroesophageal reflux disease)     Hypertension     Ulcer      Past Surgical History:   Procedure Laterality Date    CARDIAC SURGERY      angiogram    CHOLECYSTECTOMY      EYE SURGERY      lasik    HYSTERECTOMY      KNEE JOINT MANIPULATION Right 9/10/2024    Procedure: MANIPULATION, KNEE;  Surgeon: Rod Orellana MD;  Location: [facility];  Service: Orthopedics;  Laterality: Right;    KNEE SURGERY      scope    ROBOTIC ARTHROPLASTY, KNEE Right 6/25/2024    Procedure: ROBOTIC  ARTHROPLASTY, KNEE, TOTAL;  Surgeon: Rod Orellana MD;  Location: [facility];  Service: Orthopedics;  Laterality: Right;  NO SPINAL !!// cruzito    TONSILLECTOMY, ADENOIDECTOMY, BILATERAL MYRINGOTOMY AND TUBES      TUBAL LIGATION       Family History   Problem Relation Name Age of Onset    Heart failure Mother      Stroke Mother      Macular degeneration Mother      Hypertension Mother      Heart attack Father      Emphysema Father      Lung cancer Father      Heart attack Sister      Breast cancer Maternal Grandmother      Heart attack Maternal Grandmother      Blindness Maternal Grandmother      Cataracts Maternal Grandmother      Breast cancer Paternal Grandmother      Heart attack Paternal Grandmother      Blindness Paternal Grandmother      Cataracts Paternal Grandmother      Diabetes Paternal Grandmother      Amblyopia Neg Hx      Cancer Neg Hx      Glaucoma Neg Hx      Retinal detachment Neg Hx      Strabismus Neg Hx      Thyroid disease Neg Hx       Social History     Tobacco Use    Smoking status: Never    Smokeless tobacco: Never   Substance Use Topics    Alcohol use: No    Drug use: No         OBJECTIVE:     Vital Signs (Most Recent)  Temp: 98.6 °F (37 °C) (11/11/24 0932)  Pulse: 105 (11/11/24 0932)  Resp: 20 (11/11/24 0932)  BP: (!) 176/84 (11/11/24 0932)  SpO2: 99 % (11/11/24 0932)    Physical Exam:                                                       GENERAL:  Comfortable, in no acute distress.                                 HEENT EXAM:  Nonicteric.  No adenopathy.  Oropharynx is clear.               NECK:  Supple.                                                               LUNGS:  Clear.                                                               CARDIAC:  Regular rate and rhythm.  S1, S2.  No murmur.                      ABDOMEN:  Soft, positive bowel sounds, nontender.  No hepatosplenomegaly or masses.  No rebound or guarding.                                             EXTREMITIES:  No  edema.     MENTAL STATUS:  Normal, alert and oriented.      ASSESSMENT/PLAN:     Assessment: Epigastric Pain    Plan: EGD    Anesthesia Plan: MAC    ASA Grade: ASA 2 - Patient with mild systemic disease with no functional limitations    MALLAMPATI SCORE:  I (soft palate, uvula, fauces, and tonsillar pillars visible)

## 2024-11-11 NOTE — PROGRESS NOTES
"[facility] Medicine  Progress Note    Patient Name: Abi Duran  MRN: [id number]  Patient Class: IP- Inpatient   Admission Date: 11/8/2024  Length of Stay: 1 days  Attending Physician: Urszula Ch MD  Primary Care Provider: Andrew Perales Jr., MD        Subjective:     Principal Problem:Acute epigastric pain        HPI:  Abi Duran is a 69 year old female with a past medical history of CAD, GERD, osteoarthritis, hypertension, duodenal ulcer with H pylori 17 years ago, and right knee total arthroplasty manipulation on 09/2024 who presents with complaints epigastric pain associated with nausea after eating Mexican food several hours prior to arrival. She reports after eating Mexican food which included Mexican beans she went to bed then developed severe epigastric pain. She states since then she has only been able to eat crackers and drank water with no relief.  She states sometimes when she swallows her pills "feel like they get stuck."  She endorses having 5 normal consistency bowel movements today.  She denies chest pain, shortness of breath, fever, chills, hematuria, melena, hematochezia, or vomiting. 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 CBC unremarkable.  BMP with hypokalemia 3.4 otherwise unremarkable.  Initial troponin negative and ECG with normal sinus rhythm.  Urinalysis unremarkable.  She was given a GI cocktail in ED with no relief of intractable abdominal pain.  GI consult was placed.  She will be admitted to hospital medicine for further management and treatment.                 Overview/Hospital Course:  No notes on file    Interval History:  Patient seen and examined after EGD.  Still groggy from anesthesia.  EGD showed    "Normal mucosa was found in the entire esophagus.                       " "   - Z-line regular, 36 cm from the incisors.                          - 2 cm hiatal hernia.                          - Erythematous mucosa in the gastric body and                          antrum. Biopsied.                          - Normal duodenal bulb, first portion of the                          duodenum, second portion of the duodenum and third                          portion of the duodenum.                          - Dilation performed in the esophagus."    We will start clear liquid diet and advance as tolerated.      Review of Systems   Gastrointestinal:  Positive for abdominal pain and nausea. Negative for vomiting.     Objective:     Vital Signs (Most Recent):  Temp: 98.6 °F (37 °C) (11/11/24 1044)  Pulse: 98 (11/11/24 1049)  Resp: 20 (11/11/24 1049)  BP: (!) 149/75 (11/11/24 1049)  SpO2: 98 % (11/11/24 1049) Vital Signs (24h Range):  Temp:  [97.7 °F (36.5 °C)-99 °F (37.2 °C)] 98.6 °F (37 °C)  Pulse:  [] 98  Resp:  [14-20] 20  SpO2:  [94 %-99 %] 98 %  BP: (127-199)/() 149/75     Weight: 78.5 kg (173 lb)  Body mass index is 31.64 kg/m².    Intake/Output Summary (Last 24 hours) at 11/11/2024 1141  Last data filed at 11/11/2024 0715  Gross per 24 hour   Intake 240 ml   Output 700 ml   Net -460 ml         Physical Exam  Constitutional:       General: She is not in acute distress.     Appearance: She is well-developed.   HENT:      Head: Normocephalic and atraumatic.   Eyes:      Pupils: Pupils are equal, round, and reactive to light.   Cardiovascular:      Rate and Rhythm: Normal rate and regular rhythm.      Heart sounds: No murmur heard.  Pulmonary:      Effort: Pulmonary effort is normal. No respiratory distress.      Breath sounds: Normal breath sounds. No wheezing or rales.   Abdominal:      General: Bowel sounds are normal. There is no distension.      Palpations: Abdomen is soft.      Tenderness: There is abdominal tenderness (Epigastric).   Musculoskeletal:         General: Normal range " of motion.   Skin:     General: Skin is warm and dry.      Findings: No rash.   Neurological:      Mental Status: She is alert and oriented to person, place, and time.      Cranial Nerves: No cranial nerve deficit.   Psychiatric:         Behavior: Behavior normal.             Significant Labs: All pertinent labs within the past 24 hours have been reviewed.    Significant Imaging: I have reviewed all pertinent imaging results/findings within the past 24 hours.    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  Esophagus dilated during procedure.  -antiemetics p.r.n.  -b.i.d. PPI.  Q.i.d. Carafate  -clear liquid diet, advance as tolerated      History of total knee arthroplasty, right  -right total knee arthroplasty with manipulation 09/2024  -taking opiates and Zofran since procedure  -denies NSAIDs      Hypertension  Patients blood pressure range in the last 24 hours was: BP  Min: 127/58  Max: 231/110.The patient's inpatient anti-hypertensive regimen is listed below:  Current Antihypertensives  hydrALAZINE injection 10 mg, Every 6 hours PRN, Intravenous    Plan  - BP is uncontrolled, will adjust as follows:  P.r.n. IV antihypertensive  -pain control with analgesics p.r.n.

## 2024-11-11 NOTE — SUBJECTIVE & OBJECTIVE
"Interval History:  Patient seen and examined after EGD.  Still groggy from anesthesia.  EGD showed    "Normal mucosa was found in the entire esophagus.                          - Z-line regular, 36 cm from the incisors.                          - 2 cm hiatal hernia.                          - Erythematous mucosa in the gastric body and                          antrum. Biopsied.                          - Normal duodenal bulb, first portion of the                          duodenum, second portion of the duodenum and third                          portion of the duodenum.                          - Dilation performed in the esophagus."    We will start clear liquid diet and advance as tolerated.      Review of Systems   Gastrointestinal:  Positive for abdominal pain and nausea. Negative for vomiting.     Objective:     Vital Signs (Most Recent):  Temp: 98.6 °F (37 °C) (11/11/24 1044)  Pulse: 98 (11/11/24 1049)  Resp: 20 (11/11/24 1049)  BP: (!) 149/75 (11/11/24 1049)  SpO2: 98 % (11/11/24 1049) Vital Signs (24h Range):  Temp:  [97.7 °F (36.5 °C)-99 °F (37.2 °C)] 98.6 °F (37 °C)  Pulse:  [] 98  Resp:  [14-20] 20  SpO2:  [94 %-99 %] 98 %  BP: (127-199)/() 149/75     Weight: 78.5 kg (173 lb)  Body mass index is 31.64 kg/m².    Intake/Output Summary (Last 24 hours) at 11/11/2024 1141  Last data filed at 11/11/2024 0715  Gross per 24 hour   Intake 240 ml   Output 700 ml   Net -460 ml         Physical Exam  Constitutional:       General: She is not in acute distress.     Appearance: She is well-developed.   HENT:      Head: Normocephalic and atraumatic.   Eyes:      Pupils: Pupils are equal, round, and reactive to light.   Cardiovascular:      Rate and Rhythm: Normal rate and regular rhythm.      Heart sounds: No murmur heard.  Pulmonary:      Effort: Pulmonary effort is normal. No respiratory distress.      Breath sounds: Normal breath sounds. No wheezing or rales.   Abdominal:      General: Bowel sounds are " normal. There is no distension.      Palpations: Abdomen is soft.      Tenderness: There is abdominal tenderness (Epigastric).   Musculoskeletal:         General: Normal range of motion.   Skin:     General: Skin is warm and dry.      Findings: No rash.   Neurological:      Mental Status: She is alert and oriented to person, place, and time.      Cranial Nerves: No cranial nerve deficit.   Psychiatric:         Behavior: Behavior normal.             Significant Labs: All pertinent labs within the past 24 hours have been reviewed.    Significant Imaging: I have reviewed all pertinent imaging results/findings within the past 24 hours.

## 2024-11-11 NOTE — PLAN OF CARE
Problem: Adult Inpatient Plan of Care  Goal: Plan of Care Review  Outcome: Progressing     Problem: Pain Acute  Goal: Optimal Pain Control and Function  Outcome: Progressing     Problem: Nausea and Vomiting  Goal: Nausea and Vomiting Relief  Outcome: Not Progressing

## 2024-11-11 NOTE — ASSESSMENT & PLAN NOTE
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  Esophagus dilated during procedure.  -antiemetics p.r.n.  -b.i.d. PPI.  Q.i.d. Carafate  -clear liquid diet, advance as tolerated

## 2024-11-12 ENCOUNTER — PATIENT MESSAGE (OUTPATIENT)
Facility: CLINIC | Age: 69
End: 2024-11-12
Payer: MEDICARE

## 2024-11-12 VITALS
RESPIRATION RATE: 18 BRPM | HEIGHT: 62 IN | TEMPERATURE: 98 F | BODY MASS INDEX: 31.83 KG/M2 | DIASTOLIC BLOOD PRESSURE: 84 MMHG | OXYGEN SATURATION: 97 % | HEART RATE: 92 BPM | SYSTOLIC BLOOD PRESSURE: 159 MMHG | WEIGHT: 173 LBS

## 2024-11-12 LAB
ANION GAP SERPL CALC-SCNC: 15 MMOL/L (ref 8–16)
BUN SERPL-MCNC: 11 MG/DL (ref 8–23)
CALCIUM SERPL-MCNC: 9.8 MG/DL (ref 8.7–10.5)
CHLORIDE SERPL-SCNC: 109 MMOL/L (ref 95–110)
CO2 SERPL-SCNC: 19 MMOL/L (ref 23–29)
CREAT SERPL-MCNC: 0.8 MG/DL (ref 0.5–1.4)
EST. GFR  (NO RACE VARIABLE): >60 ML/MIN/1.73 M^2
GLUCOSE SERPL-MCNC: 105 MG/DL (ref 70–110)
MAGNESIUM SERPL-MCNC: 2.1 MG/DL (ref 1.6–2.6)
POTASSIUM SERPL-SCNC: 3.4 MMOL/L (ref 3.5–5.1)
SODIUM SERPL-SCNC: 143 MMOL/L (ref 136–145)

## 2024-11-12 PROCEDURE — 80048 BASIC METABOLIC PNL TOTAL CA: CPT | Performed by: HOSPITALIST

## 2024-11-12 PROCEDURE — 83735 ASSAY OF MAGNESIUM: CPT | Performed by: HOSPITALIST

## 2024-11-12 PROCEDURE — 36415 COLL VENOUS BLD VENIPUNCTURE: CPT | Performed by: HOSPITALIST

## 2024-11-12 PROCEDURE — 63600175 PHARM REV CODE 636 W HCPCS

## 2024-11-12 PROCEDURE — 97116 GAIT TRAINING THERAPY: CPT | Mod: CQ

## 2024-11-12 RX ORDER — SUCRALFATE 1 G/10ML
1 SUSPENSION ORAL EVERY 6 HOURS
Qty: 414 ML | Refills: 0 | Status: SHIPPED | OUTPATIENT
Start: 2024-11-12 | End: 2024-11-23

## 2024-11-12 RX ORDER — SUCRALFATE 1 G/1
1 TABLET ORAL
Qty: 40 TABLET | Refills: 0 | Status: SHIPPED | OUTPATIENT
Start: 2024-11-12 | End: 2024-11-12 | Stop reason: HOSPADM

## 2024-11-12 RX ORDER — POTASSIUM CHLORIDE 20 MEQ/1
40 TABLET, EXTENDED RELEASE ORAL ONCE
Status: DISCONTINUED | OUTPATIENT
Start: 2024-11-12 | End: 2024-11-12 | Stop reason: HOSPADM

## 2024-11-12 RX ADMIN — PANTOPRAZOLE SODIUM 40 MG: 40 INJECTION, POWDER, FOR SOLUTION INTRAVENOUS at 08:11

## 2024-11-12 NOTE — NURSING
Pt given/explained discharge instructions, all questions asked and answered/understanding verbalized,  @side, pt transported via w/c to POV, safety maintained

## 2024-11-12 NOTE — PLAN OF CARE
Problem: Adult Inpatient Plan of Care  Goal: Plan of Care Review  Outcome: Progressing  Goal: Patient-Specific Goal (Individualized)  Outcome: Progressing  Goal: Absence of Hospital-Acquired Illness or Injury  Outcome: Progressing  Goal: Optimal Comfort and Wellbeing  Outcome: Progressing  Goal: Readiness for Transition of Care  Outcome: Progressing     Problem: Wound  Goal: Optimal Coping  Outcome: Progressing  Goal: Optimal Functional Ability  Outcome: Progressing  Goal: Absence of Infection Signs and Symptoms  Outcome: Progressing  Goal: Improved Oral Intake  Outcome: Progressing  Goal: Optimal Pain Control and Function  Outcome: Progressing  Goal: Skin Health and Integrity  Outcome: Progressing  Goal: Optimal Wound Healing  Outcome: Progressing     Problem: Infection  Goal: Absence of Infection Signs and Symptoms  Outcome: Progressing     Problem: Pain Acute  Goal: Optimal Pain Control and Function  Outcome: Progressing     Problem: Nausea and Vomiting  Goal: Nausea and Vomiting Relief  Outcome: Progressing

## 2024-11-12 NOTE — NURSING
Zenaida in monitor reported that @0902 pt had short run of SVT , upon assessing pt, pt stated she had just came from the bathroom and had no symptoms, dr Ch made aware, MD ordered stat bmp and mag and stated to replace electrolytes as needed

## 2024-11-12 NOTE — PLAN OF CARE
Pt clear for DC from CM standpoint. Discharging home.     Spoke to [facility] regarding price of both liquid and pill carafate, they state they are both $10 each. Informed pt and she is ok with this.        11/12/24 1125   Final Note   Assessment Type Final Discharge Note   Anticipated Discharge Disposition Home   Post-Acute Status   Post-Acute Authorization Medications

## 2024-11-12 NOTE — PT/OT/SLP PROGRESS
Physical Therapy Treatment    Patient Name:  Abi Duran   MRN:  [id number]    Recommendations:     Discharge Recommendations: Low Intensity Therapy  Discharge Equipment Recommendations: none  Barriers to discharge: None    Assessment:     Abi Duran is a 69 y.o. female admitted with a medical diagnosis of Acute epigastric pain.  She presents with the following impairments/functional limitations: weakness, impaired endurance, gait instability, decreased lower extremity function . Awake, alert , supine in bed with spouse present.  Agreed to mobilize, eager to return home. Sup > sit with CGA.  Sit > stand with rw and CGA. Ambulated 500' with rw and CGA.  Reports no pain , RK stiffness initially.  Sat up in chair at bedside.     Rehab Prognosis: Good; patient would benefit from acute skilled PT services to address these deficits and reach maximum level of function.    Recent Surgery: Procedure(s) (LRB):  EGD (ESOPHAGOGASTRODUODENOSCOPY) (N/A) 1 Day Post-Op    Plan:     During this hospitalization, patient to be seen 6 x/week to address the identified rehab impairments via gait training, therapeutic activities, therapeutic exercises and progress toward the following goals:    Plan of Care Expires:       Subjective     Chief Complaint: RK stiffness  Patient/Family Comments/goals: to return home  Pain/Comfort:  Pain Rating 1: 0/10      Objective:     Communicated with nurse Gooden prior to session.  Patient found supine with telemetry upon PT entry to room.     General Precautions: Standard, fall  Orthopedic Precautions:    Braces: N/A  Respiratory Status: Room air     Functional Mobility:  Bed Mobility:     Supine to Sit: contact guard assistance  Transfers:     Sit to Stand:  contact guard assistance with rolling walker  Gait: 500' with rw and CGA.      AM-PAC 6 CLICK MOBILITY          Treatment & Education:  Ambulated with rw and CGA for safety.     Patient left up in chair with all lines intact, call button  in reach, and nurse Berkley notified..    GOALS:   Multidisciplinary Problems       Physical Therapy Goals       Not on file                    Time Tracking:     PT Received On: 11/12/24  PT Start Time: 1015     PT Stop Time: 1025  PT Total Time (min): 10 min     Billable Minutes: Gait Training 10min    Treatment Type: Treatment  PT/PTA: PTA     Number of PTA visits since last PT visit: 2     11/12/2024

## 2024-11-13 NOTE — DISCHARGE SUMMARY
"[facility]  Discharge Summary      Patient Name: Abi Duran  MRN: [id number]  ELI: [id number]  Patient Class: IP- Inpatient  Admission Date: 11/8/2024  Hospital Length of Stay: 2 days  Discharge Date and Time: 11/12/2024 11:31 AM  Attending Physician: No att. providers found   Discharging Provider: Urszula Ch MD  Primary Care Provider: Andrew Perales Jr., MD    Primary Care Team: Networked reference to record PCT     HPI:   Abi Duran is a 69 year old female with a past medical history of CAD, GERD, osteoarthritis, hypertension, duodenal ulcer with H pylori 17 years ago, and right knee total arthroplasty manipulation on 09/2024 who presents with complaints epigastric pain associated with nausea after eating Mexican food several hours prior to arrival. She reports after eating Mexican food which included Mexican beans she went to bed then developed severe epigastric pain. She states since then she has only been able to eat crackers and drank water with no relief.  She states sometimes when she swallows her pills "feel like they get stuck."  She endorses having 5 normal consistency bowel movements today.  She denies chest pain, shortness of breath, fever, chills, hematuria, melena, hematochezia, or vomiting. 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 CBC unremarkable.  BMP with hypokalemia 3.4 otherwise unremarkable.  Initial troponin negative and ECG with normal sinus rhythm.  Urinalysis unremarkable.  She was given a GI cocktail in ED with no relief of intractable abdominal pain.  GI consult was placed.  She will be admitted to hospital medicine for further management and treatment.                 Procedure(s) (LRB):  EGD (ESOPHAGOGASTRODUODENOSCOPY) (N/A)      Hospital Course:   Patient was admitted to " the hospital medicine service with epigastric abdominal pain.  This was associated with severe nausea and difficulty tolerating any p.o. intake.  She was started on a PPI and evaluated by GI.  Recommendation was made to start clears and advance as tolerated and discharge home once tolerating however patient could not tolerate the clears.  And underwent EGD with GI which showed erythematous mucosa in the gastric body and antrum which was biopsied.  An esophageal dilation was performed.  Patient's symptoms resolved with the esophageal dilation and she was able to tolerate regular diet.  She was discharged home once tolerating the diet.  She was placed on b.i.d. PPI per GI request.  Carafate for 10 days was prescribed as well.  Patient will follow up with PCP and GI.  Patient was agreeable to discharge plan and felt well at time of discharge     Goals of Care Treatment Preferences:  Code Status: Full Code      SDOH Screening:  The patient was screened for utility difficulties, food insecurity, transport difficulties, housing insecurity, and interpersonal safety and there were no concerns identified this admission.     Consults:   Consults (From admission, onward)          Status Ordering Provider     Inpatient consult to Gastroenterology  Once        Provider:  Patricio Garcia DO Completed FOUCHEA, ANGELA            No new Assessment & Plan notes have been filed under this hospital service since the last note was generated.  Service: Hospital Medicine    Final Active Diagnoses:    Diagnosis Date Noted POA    PRINCIPAL PROBLEM:  Acute epigastric pain [R10.13] 11/09/2024 Yes    History of total knee arthroplasty, right [Z96.651] 11/09/2024 Not Applicable    Hypertension [I10] 06/25/2024 Yes      Problems Resolved During this Admission:       Discharged Condition: good    Disposition: Home or Self Care    Follow Up:   Follow-up Information       Andrew Perales Jr., MD Follow up.    Specialty: Family  "Medicine  Why: As needed  Contact information:  [address] [facility]  [address]  [place] [address]  [phone]               [facility]. Go on 11/18/2024.    Specialty: Primary Care  Why: Hospital follow up scheduled at 11:00 a.m. on 11/18.  Contact information:  [address] [facility]  [address]  Located within [facility] [address] [phone]  Additional information:  [address]                         Patient Instructions:      Ambulatory referral/consult to Gastroenterology   Standing Status: Future   Referral Priority: Routine Referral Type: Consultation   Referral Reason: Specialty Services Required   Requested Specialty: Gastroenterology   Number of Visits Requested: 1     Notify your health care provider if you experience any of the following:  temperature >100.4     Notify your health care provider if you experience any of the following:  persistent nausea and vomiting or diarrhea     Notify your health care provider if you experience any of the following:  redness, tenderness, or signs of infection (pain, swelling, redness, odor or green/yellow discharge around incision site)     Notify your health care provider if you experience any of the following:  persistent dizziness, light-headedness, or visual disturbances     Notify your health care provider if you experience any of the following:  increased confusion or weakness     Activity as tolerated       Significant Diagnostic Studies: Labs: BMP:   Recent Labs   Lab 11/12/24  0928         K 3.4*      CO2 19*   BUN 11   CREATININE 0.8   CALCIUM 9.8   MG 2.1    and CBC No results for input(s): "WBC", "HGB", "HCT", "PLT" in the last 48 hours.    Pending Diagnostic Studies:       None           Medications:  Reconciled Home Medications:      Medication List        START taking these medications      pantoprazole 40 MG tablet  Commonly known as: PROTONIX  Take 1 tablet (40 mg total) by mouth 2 (two) times daily.     sucralfate 100 mg/mL " suspension  Commonly known as: CARAFATE  Take 10 mLs (1 g total) by mouth every 6 (six) hours. for 10 days            CONTINUE taking these medications      BORON ORAL  Take by mouth.     gabapentin 300 MG capsule  Commonly known as: NEURONTIN  Take 1 capsule (300 mg total) by mouth 3 (three) times daily.     * HYDROcodone-acetaminophen  mg per tablet  Commonly known as: NORCO  Take 1 tablet by mouth every 4 (four) hours as needed for Pain.     * HYDROcodone-acetaminophen  mg per tablet  Commonly known as: NORCO  Take 1 tablet by mouth every 4 (four) hours as needed for Pain.     * ondansetron 8 MG tablet  Commonly known as: ZOFRAN  Take 1 tablet (8 mg total) by mouth every 8 (eight) hours as needed.     * ondansetron 4 MG tablet  Commonly known as: ZOFRAN  Take 1 tablet (4 mg total) by mouth every 6 (six) hours as needed for Nausea.     UNABLE TO FIND  Take 1 capsule by mouth Daily. medication name:NMN supplement           * This list has 4 medication(s) that are the same as other medications prescribed for you. Read the directions carefully, and ask your doctor or other care provider to review them with you.                  Indwelling Lines/Drains at time of discharge:   Lines/Drains/Airways       None                   Time spent on the discharge of patient: 35 minutes         Urszula Ch MD  Department of Hospital Medicine  [facility]/Surg

## 2024-11-13 NOTE — HOSPITAL COURSE
Patient was admitted to the hospital medicine service with epigastric abdominal pain.  This was associated with severe nausea and difficulty tolerating any p.o. intake.  She was started on a PPI and evaluated by GI.  Recommendation was made to start clears and advance as tolerated and discharge home once tolerating however patient could not tolerate the clears.  And underwent EGD with GI which showed erythematous mucosa in the gastric body and antrum which was biopsied.  An esophageal dilation was performed.  Patient's symptoms resolved with the esophageal dilation and she was able to tolerate regular diet.  She was discharged home once tolerating the diet.  She was placed on b.i.d. PPI per GI request.  Carafate for 10 days was prescribed as well.  Patient will follow up with PCP and GI.  Patient was agreeable to discharge plan and felt well at time of discharge

## 2024-11-14 ENCOUNTER — PATIENT OUTREACH (OUTPATIENT)
Dept: ADMINISTRATIVE | Facility: CLINIC | Age: 69
End: 2024-11-14
Payer: MEDICARE

## 2024-11-14 NOTE — PROGRESS NOTES
C3 nurse spoke with Abi Duran  for a TCC post hospital discharge follow up call. The patient does not have a scheduled HOSFU appointment with Andrew Perales Jr., MD  within 5-7 days post hospital discharge date 11/12/2024. C3 nurse was unable to schedule HOSFU appointment in [facility].  Please contact patient and schedule follow up appointment using HOSFU visit type on or before 11/19/2024.

## 2024-12-05 ENCOUNTER — OFFICE VISIT (OUTPATIENT)
Dept: ORTHOPEDICS | Facility: CLINIC | Age: 69
End: 2024-12-05
Payer: MEDICARE

## 2024-12-05 VITALS — WEIGHT: 173.06 LBS | BODY MASS INDEX: 31.85 KG/M2 | HEIGHT: 62 IN | RESPIRATION RATE: 18 BRPM

## 2024-12-05 DIAGNOSIS — Z96.651 S/P TOTAL KNEE REPLACEMENT, RIGHT: Primary | ICD-10-CM

## 2024-12-05 PROCEDURE — 99999 PR PBB SHADOW E&M-EST. PATIENT-LVL III: CPT | Mod: PBBFAC,,, | Performed by: ORTHOPAEDIC SURGERY

## 2024-12-05 PROCEDURE — 99213 OFFICE O/P EST LOW 20 MIN: CPT | Mod: PBBFAC,PO | Performed by: ORTHOPAEDIC SURGERY

## 2024-12-05 PROCEDURE — 99214 OFFICE O/P EST MOD 30 MIN: CPT | Mod: S$PBB,,, | Performed by: ORTHOPAEDIC SURGERY

## 2024-12-05 RX ORDER — ONDANSETRON 4 MG/1
4 TABLET, FILM COATED ORAL EVERY 6 HOURS PRN
Qty: 30 TABLET | Refills: 0 | Status: SHIPPED | OUTPATIENT
Start: 2024-12-05

## 2024-12-05 NOTE — PROGRESS NOTES
Past Medical History:   Diagnosis Date    Allergy     Arthritis     Coronary artery disease     GERD (gastroesophageal reflux disease)     Hypertension     Ulcer        Past Surgical History:   Procedure Laterality Date    CARDIAC SURGERY      angiogram    CHOLECYSTECTOMY      ESOPHAGOGASTRODUODENOSCOPY N/A 11/11/2024    Procedure: EGD (ESOPHAGOGASTRODUODENOSCOPY);  Surgeon: Patricio Garcia DO;  Location: [facility];  Service: Endoscopy;  Laterality: N/A;    EYE SURGERY      lasik    HYSTERECTOMY      KNEE JOINT MANIPULATION Right 9/10/2024    Procedure: MANIPULATION, KNEE;  Surgeon: Rod Orellana MD;  Location: [facility] OR;  Service: Orthopedics;  Laterality: Right;    KNEE SURGERY      scope    ROBOTIC ARTHROPLASTY, KNEE Right 6/25/2024    Procedure: ROBOTIC ARTHROPLASTY, KNEE, TOTAL;  Surgeon: Rod Orellana MD;  Location: [facility];  Service: Orthopedics;  Laterality: Right;  NO SPINAL !!// cruzito    TONSILLECTOMY, ADENOIDECTOMY, BILATERAL MYRINGOTOMY AND TUBES      TUBAL LIGATION         Current Outpatient Medications   Medication Sig    BORON ORAL Take by mouth.    HYDROcodone-acetaminophen (NORCO)  mg per tablet Take 1 tablet by mouth every 4 (four) hours as needed for Pain.    UNABLE TO FIND Take 1 capsule by mouth Daily. medication name:NMN supplement    ondansetron (ZOFRAN) 4 MG tablet Take 1 tablet (4 mg total) by mouth every 6 (six) hours as needed for Nausea.     No current facility-administered medications for this visit.       Review of patient's allergies indicates:   Allergen Reactions    Amoxicillin Nausea Only     Cannot tolerate side effects    Doxycycline Nausea Only    Erythromycin Nausea Only     Other reaction(s): Nausea    Imitrex [sumatriptan succinate] Swelling and Palpitations     Other reaction(s): swelling  Other reaction(s): chest pains    Phenergan [promethazine] Other (See Comments)     Other reaction(s): Muscle pain  Muscle pain    Sulfa (sulfonamide  antibiotics) Nausea And Vomiting       Family History   Problem Relation Name Age of Onset    Heart failure Mother      Stroke Mother      Macular degeneration Mother      Hypertension Mother      Heart attack Father      Emphysema Father      Lung cancer Father      Heart attack Sister      Breast cancer Maternal Grandmother      Heart attack Maternal Grandmother      Blindness Maternal Grandmother      Cataracts Maternal Grandmother      Breast cancer Paternal Grandmother      Heart attack Paternal Grandmother      Blindness Paternal Grandmother      Cataracts Paternal Grandmother      Diabetes Paternal Grandmother      Amblyopia Neg Hx      Cancer Neg Hx      Glaucoma Neg Hx      Retinal detachment Neg Hx      Strabismus Neg Hx      Thyroid disease Neg Hx         Social History     Socioeconomic History    Marital status:    Tobacco Use    Smoking status: Never    Smokeless tobacco: Never   Substance and Sexual Activity    Alcohol use: No    Drug use: No    Sexual activity: Yes     Partners: Male     Social Drivers of Health     Financial Resource Strain: Low Risk  (11/9/2024)    Overall Financial Resource Strain (CARDIA)     Difficulty of Paying Living Expenses: Not hard at all   Food Insecurity: No Food Insecurity (11/9/2024)    Hunger Vital Sign     Worried About Running Out of Food in the Last Year: Never true     Ran Out of Food in the Last Year: Never true   Transportation Needs: No Transportation Needs (11/9/2024)    TRANSPORTATION NEEDS     Transportation : No   Physical Activity: Sufficiently Active (9/11/2024)    Exercise Vital Sign     Days of Exercise per Week: 3 days     Minutes of Exercise per Session: 60 min   Stress: No Stress Concern Present (11/9/2024)    [place] [employer] - Occupational Stress Questionnaire     Feeling of Stress : Not at all   Housing Stability: Low Risk  (11/9/2024)    Housing Stability Vital Sign     Unable to Pay for Housing in the Last Year: No      Homeless in the Last Year: No       Chief Complaint:   Chief Complaint   Patient presents with    Follow-up     Right knee post TKA 6/25/24 and QUIN 9/10/24       Date of surgery:  June 25, 2024 right total knee   September 10, 2024 right knee manipulation    History of present illness:  69-year-old female underwent right robotic assisted total knee arthroplasty.  Patient is doing a little bit better.  She is able to get up to 106° in physical therapy with 96 actively.    She is also using the [place] splint.  Pain is currently a 2/10.      Review of Systems:    Musculoskeletal:  See HPI        Physical Examination:    Vital Signs:    Vitals:    12/05/24 0928   Resp: 18       Body mass index is 31.65 kg/m².    This a well-developed, well nourished patient in no acute distress.  They are alert and oriented and cooperative to examination.  Pt. walks with a very slow antalgic gait using a walker    Examination of the right knee shows well-healed surgical incision.  No erythema drainage.   Still has a little swelling in the leg and knee.  Range of motion is about 3° to 95°.  No calf pain.      X-rays:  Two views of the right knee are  review which show well-aligned right total knee arthroplasty without complication     Assessment::  Status post right robotic assisted total knee arthroplasty    Plan:  I reviewed the findings with her today.  Continue with the aggressive physical therapy.  Continue with the [place] splint.  Refilled her Norco for her daily pain.  Follow up in 2 months      This note was created using M Modal voice recognition software that occasionally misinterpreted phrases or words.

## 2024-12-30 DIAGNOSIS — Z96.651 S/P TOTAL KNEE REPLACEMENT, RIGHT: ICD-10-CM

## 2024-12-30 DIAGNOSIS — M25.661 KNEE STIFFNESS, RIGHT: ICD-10-CM

## 2024-12-30 RX ORDER — HYDROCODONE BITARTRATE AND ACETAMINOPHEN 10; 325 MG/1; MG/1
1 TABLET ORAL EVERY 6 HOURS PRN
Qty: 28 TABLET | Refills: 0 | Status: SHIPPED | OUTPATIENT
Start: 2024-12-30

## 2025-01-12 ENCOUNTER — PATIENT MESSAGE (OUTPATIENT)
Dept: ORTHOPEDICS | Facility: CLINIC | Age: 70
End: 2025-01-12
Payer: MEDICARE

## 2025-01-30 ENCOUNTER — OFFICE VISIT (OUTPATIENT)
Dept: ORTHOPEDICS | Facility: CLINIC | Age: 70
End: 2025-01-30
Payer: MEDICARE

## 2025-01-30 DIAGNOSIS — Z96.651 S/P TOTAL KNEE REPLACEMENT, RIGHT: Primary | ICD-10-CM

## 2025-01-30 DIAGNOSIS — M25.661 KNEE STIFFNESS, RIGHT: ICD-10-CM

## 2025-01-30 PROCEDURE — 99999 PR PBB SHADOW E&M-EST. PATIENT-LVL I: CPT | Mod: PBBFAC,,, | Performed by: ORTHOPAEDIC SURGERY

## 2025-01-30 PROCEDURE — 99214 OFFICE O/P EST MOD 30 MIN: CPT | Mod: S$PBB,,, | Performed by: ORTHOPAEDIC SURGERY

## 2025-01-30 PROCEDURE — 99211 OFF/OP EST MAY X REQ PHY/QHP: CPT | Mod: PBBFAC,PO | Performed by: ORTHOPAEDIC SURGERY

## 2025-01-30 NOTE — PROGRESS NOTES
Past Medical History:   Diagnosis Date    Allergy     Arthritis     Coronary artery disease     GERD (gastroesophageal reflux disease)     Hypertension     Ulcer        Past Surgical History:   Procedure Laterality Date    CARDIAC SURGERY      angiogram    CHOLECYSTECTOMY      ESOPHAGOGASTRODUODENOSCOPY N/A 11/11/2024    Procedure: EGD (ESOPHAGOGASTRODUODENOSCOPY);  Surgeon: Patricio Garcia DO;  Location: [facility];  Service: Endoscopy;  Laterality: N/A;    EYE SURGERY      lasik    HYSTERECTOMY      KNEE JOINT MANIPULATION Right 9/10/2024    Procedure: MANIPULATION, KNEE;  Surgeon: Rod Orellana MD;  Location: [facility] OR;  Service: Orthopedics;  Laterality: Right;    KNEE SURGERY      scope    ROBOTIC ARTHROPLASTY, KNEE Right 6/25/2024    Procedure: ROBOTIC ARTHROPLASTY, KNEE, TOTAL;  Surgeon: Rod Orellana MD;  Location: [facility];  Service: Orthopedics;  Laterality: Right;  NO SPINAL !!// cruzito    TONSILLECTOMY, ADENOIDECTOMY, BILATERAL MYRINGOTOMY AND TUBES      TUBAL LIGATION         Current Outpatient Medications   Medication Sig    BORON ORAL Take by mouth.    HYDROcodone-acetaminophen (NORCO)  mg per tablet Take 1 tablet by mouth every 6 (six) hours as needed for Pain.    ondansetron (ZOFRAN) 4 MG tablet Take 1 tablet (4 mg total) by mouth every 6 (six) hours as needed for Nausea.    UNABLE TO FIND Take 1 capsule by mouth Daily. medication name:NMN supplement     No current facility-administered medications for this visit.       Review of patient's allergies indicates:   Allergen Reactions    Amoxicillin Nausea Only     Cannot tolerate side effects    Doxycycline Nausea Only    Erythromycin Nausea Only     Other reaction(s): Nausea    Imitrex [sumatriptan succinate] Swelling and Palpitations     Other reaction(s): swelling  Other reaction(s): chest pains    Phenergan [promethazine] Other (See Comments)     Other reaction(s): Muscle pain  Muscle pain    Sulfa (sulfonamide  antibiotics) Nausea And Vomiting       Family History   Problem Relation Name Age of Onset    Heart failure Mother      Stroke Mother      Macular degeneration Mother      Hypertension Mother      Heart attack Father      Emphysema Father      Lung cancer Father      Heart attack Sister      Breast cancer Maternal Grandmother      Heart attack Maternal Grandmother      Blindness Maternal Grandmother      Cataracts Maternal Grandmother      Breast cancer Paternal Grandmother      Heart attack Paternal Grandmother      Blindness Paternal Grandmother      Cataracts Paternal Grandmother      Diabetes Paternal Grandmother      Amblyopia Neg Hx      Cancer Neg Hx      Glaucoma Neg Hx      Retinal detachment Neg Hx      Strabismus Neg Hx      Thyroid disease Neg Hx         Social History     Socioeconomic History    Marital status:    Tobacco Use    Smoking status: Never    Smokeless tobacco: Never   Substance and Sexual Activity    Alcohol use: No    Drug use: No    Sexual activity: Yes     Partners: Male     Social Drivers of Health     Financial Resource Strain: Low Risk  (11/9/2024)    Overall Financial Resource Strain (CARDIA)     Difficulty of Paying Living Expenses: Not hard at all   Food Insecurity: No Food Insecurity (11/9/2024)    Hunger Vital Sign     Worried About Running Out of Food in the Last Year: Never true     Ran Out of Food in the Last Year: Never true   Transportation Needs: No Transportation Needs (11/9/2024)    TRANSPORTATION NEEDS     Transportation : No   Physical Activity: Sufficiently Active (9/11/2024)    Exercise Vital Sign     Days of Exercise per Week: 3 days     Minutes of Exercise per Session: 60 min   Stress: No Stress Concern Present (11/9/2024)    [place] [employer] - Occupational Stress Questionnaire     Feeling of Stress : Not at all   Housing Stability: Low Risk  (11/9/2024)    Housing Stability Vital Sign     Unable to Pay for Housing in the Last Year: No      Homeless in the Last Year: No       Chief Complaint:   No chief complaint on file.      Date of surgery:  June 25, 2024 right total knee   September 10, 2024 right knee manipulation    History of present illness:  69-year-old female underwent right robotic assisted total knee arthroplasty.  Patient is doing a little bit better.  She is able to get up to 111° in physical therapy with 100 actively.    She finished with the [place] splint.  Pain is currently a 2/10.  She is only needing the Norco at night, not for physical therapy anymore.  She is also off the walker and just using a cane.    Review of Systems:    Musculoskeletal:  See HPI        Physical Examination:    Vital Signs:    There were no vitals filed for this visit.      There is no height or weight on file to calculate BMI.    This a well-developed, well nourished patient in no acute distress.  They are alert and oriented and cooperative to examination.  Pt. walks with an improved gait with just a cane now    Examination of the right knee shows well-healed surgical incision.  No erythema drainage.   Still has a little swelling in the leg and knee.  Range of motion is about 3° to 105°.  Lot of tenderness over the IT band as well as over the pes bursal tendons      X-rays:  Two views of the right knee are  review which show well-aligned right total knee arthroplasty without complication     Assessment::  Status post right robotic assisted total knee arthroplasty    Plan:  I reviewed the findings with her today.  Continue with the aggressive physical therapy.  Recommended some modalities not just for the IT band but for the hamstring and pes bursal tendons.  Follow up in 2 months with x-rays of both knees at the next visit      This note was created using M Modal voice recognition software that occasionally misinterpreted phrases or words.

## 2025-02-04 DIAGNOSIS — M25.661 KNEE STIFFNESS, RIGHT: ICD-10-CM

## 2025-02-04 DIAGNOSIS — Z96.651 S/P TOTAL KNEE REPLACEMENT, RIGHT: ICD-10-CM

## 2025-02-04 RX ORDER — HYDROCODONE BITARTRATE AND ACETAMINOPHEN 10; 325 MG/1; MG/1
1 TABLET ORAL EVERY 6 HOURS PRN
Qty: 28 TABLET | Refills: 0 | Status: SHIPPED | OUTPATIENT
Start: 2025-02-04 | End: 2025-02-28 | Stop reason: SDUPTHER

## 2025-02-06 DIAGNOSIS — Z96.651 S/P TOTAL KNEE REPLACEMENT, RIGHT: Primary | ICD-10-CM

## 2025-02-06 DIAGNOSIS — M25.661 KNEE STIFFNESS, RIGHT: ICD-10-CM

## 2025-02-06 RX ORDER — DEXAMETHASONE SODIUM PHOSPHATE 4 MG/ML
INJECTION, SOLUTION INTRA-ARTICULAR; INTRALESIONAL; INTRAMUSCULAR; INTRAVENOUS; SOFT TISSUE
Qty: 5 ML | Refills: 10 | Status: SHIPPED | OUTPATIENT
Start: 2025-02-06

## 2025-02-06 NOTE — TELEPHONE ENCOUNTER
----- Message from Med Assistant Thacker sent at 2/6/2025  1:08 PM CST -----  Contact: [facility]  Needs to speak to office about meds    Call back number  [phone]

## 2025-02-07 ENCOUNTER — PATIENT MESSAGE (OUTPATIENT)
Dept: ORTHOPEDICS | Facility: CLINIC | Age: 70
End: 2025-02-07
Payer: MEDICARE

## 2025-02-27 ENCOUNTER — PATIENT MESSAGE (OUTPATIENT)
Dept: ORTHOPEDICS | Facility: CLINIC | Age: 70
End: 2025-02-27
Payer: MEDICARE

## 2025-02-28 DIAGNOSIS — M25.661 KNEE STIFFNESS, RIGHT: ICD-10-CM

## 2025-02-28 DIAGNOSIS — Z96.651 S/P TOTAL KNEE REPLACEMENT, RIGHT: ICD-10-CM

## 2025-02-28 RX ORDER — HYDROCODONE BITARTRATE AND ACETAMINOPHEN 10; 325 MG/1; MG/1
1 TABLET ORAL EVERY 6 HOURS PRN
Qty: 28 TABLET | Refills: 0 | Status: SHIPPED | OUTPATIENT
Start: 2025-02-28

## 2025-03-26 ENCOUNTER — PATIENT MESSAGE (OUTPATIENT)
Dept: ORTHOPEDICS | Facility: CLINIC | Age: 70
End: 2025-03-26
Payer: MEDICARE

## 2025-03-27 DIAGNOSIS — Z96.651 S/P TOTAL KNEE REPLACEMENT, RIGHT: ICD-10-CM

## 2025-03-27 DIAGNOSIS — M25.661 KNEE STIFFNESS, RIGHT: ICD-10-CM

## 2025-03-27 RX ORDER — HYDROCODONE BITARTRATE AND ACETAMINOPHEN 10; 325 MG/1; MG/1
1 TABLET ORAL EVERY 6 HOURS PRN
Qty: 28 TABLET | Refills: 0 | Status: SHIPPED | OUTPATIENT
Start: 2025-03-27

## 2025-03-27 RX ORDER — ONDANSETRON 4 MG/1
4 TABLET, FILM COATED ORAL EVERY 6 HOURS PRN
Qty: 30 TABLET | Refills: 0 | Status: SHIPPED | OUTPATIENT
Start: 2025-03-27

## 2025-03-28 DIAGNOSIS — Z96.651 S/P TOTAL KNEE REPLACEMENT, RIGHT: Primary | ICD-10-CM

## 2025-04-03 ENCOUNTER — OFFICE VISIT (OUTPATIENT)
Dept: ORTHOPEDICS | Facility: CLINIC | Age: 70
End: 2025-04-03
Payer: MEDICARE

## 2025-04-03 ENCOUNTER — HOSPITAL ENCOUNTER (OUTPATIENT)
Dept: RADIOLOGY | Facility: HOSPITAL | Age: 70
Discharge: HOME | End: 2025-04-03
Attending: ORTHOPAEDIC SURGERY
Payer: MEDICARE

## 2025-04-03 VITALS — BODY MASS INDEX: 31.85 KG/M2 | WEIGHT: 173.06 LBS | HEIGHT: 62 IN

## 2025-04-03 DIAGNOSIS — Z96.651 S/P TOTAL KNEE REPLACEMENT, RIGHT: Primary | ICD-10-CM

## 2025-04-03 DIAGNOSIS — Z96.651 S/P TOTAL KNEE REPLACEMENT, RIGHT: ICD-10-CM

## 2025-04-03 DIAGNOSIS — M25.661 KNEE STIFFNESS, RIGHT: ICD-10-CM

## 2025-04-03 PROCEDURE — 99214 OFFICE O/P EST MOD 30 MIN: CPT | Mod: S$PBB,,, | Performed by: ORTHOPAEDIC SURGERY

## 2025-04-03 PROCEDURE — 73564 X-RAY EXAM KNEE 4 OR MORE: CPT | Mod: 26,50,, | Performed by: RADIOLOGY

## 2025-04-03 PROCEDURE — 99213 OFFICE O/P EST LOW 20 MIN: CPT | Mod: PBBFAC,25,PO | Performed by: ORTHOPAEDIC SURGERY

## 2025-04-03 PROCEDURE — 73564 X-RAY EXAM KNEE 4 OR MORE: CPT | Mod: TC,50,PO

## 2025-04-03 PROCEDURE — 99999 PR PBB SHADOW E&M-EST. PATIENT-LVL III: CPT | Mod: PBBFAC,,, | Performed by: ORTHOPAEDIC SURGERY

## 2025-04-03 RX ORDER — DICLOFENAC EPOLAMINE 0.01 G/1
1 SYSTEM TOPICAL 2 TIMES DAILY
Qty: 30 PATCH | Refills: 2 | Status: SHIPPED | OUTPATIENT
Start: 2025-04-03 | End: 2025-04-13

## 2025-04-03 NOTE — PROGRESS NOTES
Past Medical History:   Diagnosis Date    Allergy     Arthritis     Coronary artery disease     GERD (gastroesophageal reflux disease)     Hypertension     Ulcer        Past Surgical History:   Procedure Laterality Date    CARDIAC SURGERY      angiogram    CHOLECYSTECTOMY      ESOPHAGOGASTRODUODENOSCOPY N/A 11/11/2024    Procedure: EGD (ESOPHAGOGASTRODUODENOSCOPY);  Surgeon: Patricio Garcia DO;  Location: [facility] [place];  Service: Endoscopy;  Laterality: N/A;    EYE SURGERY      lasik    HYSTERECTOMY      KNEE JOINT MANIPULATION Right 9/10/2024    Procedure: MANIPULATION, KNEE;  Surgeon: Rod Orellana MD;  Location: [facility] OR;  Service: Orthopedics;  Laterality: Right;    KNEE SURGERY      scope    ROBOTIC ARTHROPLASTY, KNEE Right 6/25/2024    Procedure: ROBOTIC ARTHROPLASTY, KNEE, TOTAL;  Surgeon: Rod Orellana MD;  Location: [facility];  Service: Orthopedics;  Laterality: Right;  NO SPINAL !!// cruzito    TONSILLECTOMY, ADENOIDECTOMY, BILATERAL MYRINGOTOMY AND TUBES      TUBAL LIGATION         Current Outpatient Medications   Medication Sig    BORON ORAL Take by mouth.    diclofenac (FLECTOR) 1.3 % PT12 Apply 1 patch topically 2 (two) times daily. for 10 days    HYDROcodone-acetaminophen (NORCO)  mg per tablet Take 1 tablet by mouth every 6 (six) hours as needed for Pain.    ondansetron (ZOFRAN) 4 MG tablet Take 1 tablet (4 mg total) by mouth every 6 (six) hours as needed for Nausea.    UNABLE TO FIND Take 1 capsule by mouth Daily. medication name:NMN supplement     No current facility-administered medications for this visit.       Review of patient's allergies indicates:   Allergen Reactions    Amoxicillin Nausea Only     Cannot tolerate side effects    Doxycycline Nausea Only    Erythromycin Nausea Only     Other reaction(s): Nausea    Imitrex [sumatriptan succinate] Swelling and Palpitations     Other reaction(s): swelling  Other reaction(s): chest pains    Phenergan [promethazine] Other  (See Comments)     Other reaction(s): Muscle pain  Muscle pain    Sulfa (sulfonamide antibiotics) Nausea And Vomiting       Family History   Problem Relation Name Age of Onset    Heart failure Mother      Stroke Mother      Macular degeneration Mother      Hypertension Mother      Heart attack Father      Emphysema Father      Lung cancer Father      Heart attack Sister      Breast cancer Maternal Grandmother      Heart attack Maternal Grandmother      Blindness Maternal Grandmother      Cataracts Maternal Grandmother      Breast cancer Paternal Grandmother      Heart attack Paternal Grandmother      Blindness Paternal Grandmother      Cataracts Paternal Grandmother      Diabetes Paternal Grandmother      Amblyopia Neg Hx      Cancer Neg Hx      Glaucoma Neg Hx      Retinal detachment Neg Hx      Strabismus Neg Hx      Thyroid disease Neg Hx         Social History     Socioeconomic History    Marital status:    Tobacco Use    Smoking status: Never    Smokeless tobacco: Never   Substance and Sexual Activity    Alcohol use: No    Drug use: No    Sexual activity: Yes     Partners: Male     Social Drivers of Health     Financial Resource Strain: Low Risk  (11/9/2024)    Overall Financial Resource Strain (CARDIA)     Difficulty of Paying Living Expenses: Not hard at all   Food Insecurity: No Food Insecurity (11/9/2024)    Hunger Vital Sign     Worried About Running Out of Food in the Last Year: Never true     Ran Out of Food in the Last Year: Never true   Transportation Needs: No Transportation Needs (11/9/2024)    TRANSPORTATION NEEDS     Transportation : No   Physical Activity: Sufficiently Active (9/11/2024)    Exercise Vital Sign     Days of Exercise per Week: 3 days     Minutes of Exercise per Session: 60 min   Stress: No Stress Concern Present (11/9/2024)    [employer] - Occupational Stress Questionnaire     Feeling of Stress : Not at all   Housing Stability: Unknown (11/9/2024)     Housing Stability Vital Sign     Unable to Pay for Housing in the Last Year: No     Homeless in the Last Year: No       Chief Complaint:   Chief Complaint   Patient presents with    Right Knee - Pain     S/p TKA       Date of surgery:  June 25, 2024 right total knee   September 10, 2024 right knee manipulation    History of present illness:  69-year-old female underwent right robotic assisted total knee arthroplasty.  Patient is doing a little bit better.  She is able to get up to 116° in physical therapy with 105 actively.     Pain is currently a 3/10.  She is only needing the Norco at night, not for physical therapy anymore.  She is also off the walker and just using a cane.  Just gets some point tenderness along the anteromedial knee with certain positions and at certain times.    Review of Systems:    Musculoskeletal:  See HPI        Physical Examination:    Vital Signs:    There were no vitals filed for this visit.      Body mass index is 31.65 kg/m².    This a well-developed, well nourished patient in no acute distress.  They are alert and oriented and cooperative to examination.  Pt. walks with an improved gait with just a cane now    Examination of the right knee shows well-healed surgical incision.  No erythema drainage.   Still has a little swelling in the leg and knee.  Range of motion is about 3° to 115°.  Lot of tenderness over the IT band as well as over the pes bursal tendons      X-rays:  Four views of both knees are ordered and review which show  right total knee arthroplasty with some patellar tilt     Assessment::  Status post right robotic assisted total knee arthroplasty    Plan:  I reviewed the findings with her today.  Continue with the aggressive physical therapy.  Ordered her some Flector patches.  She is having some point tenderness right over the pes bursa and anteromedial knee.  She has tried Voltaren gel without it helping.  Does get some relief with Kinesio taping.  Follow up in 2  months.  No x-rays needed.      This note was created using Expert[employer] voice recognition software that occasionally misinterpreted phrases or words.

## 2025-04-04 ENCOUNTER — TELEPHONE (OUTPATIENT)
Dept: ORTHOPEDICS | Facility: CLINIC | Age: 70
End: 2025-04-04
Payer: MEDICARE

## 2025-04-04 ENCOUNTER — PATIENT MESSAGE (OUTPATIENT)
Dept: ORTHOPEDICS | Facility: CLINIC | Age: 70
End: 2025-04-04
Payer: MEDICARE

## 2025-04-04 NOTE — TELEPHONE ENCOUNTER
Called and spoke with pt   She said she does not need a prescription for lidocane patches that she will try them over the counter  If this does not work for her she will call back

## 2025-04-04 NOTE — TELEPHONE ENCOUNTER
----- Message from Rod Orellana MD sent at 4/4/2025 10:19 AM CDT -----  Can try some Qutenza patches or Lidocaine patches  ----- Message -----  From: Shanel Diaz LPN  Sent: 4/4/2025  10:10 AM CDT  To: Rod Orellana MD    Please advise  ----- Message -----  From: Devyn Matson  Sent: 4/4/2025   9:51 AM CDT  To: Esteban Gutierrez Staff    Patient states that both pharmacies are out of her prescription and would like a callback regarding prescribing an alternative medication:diclofenac (FLECTOR) 1.3 % PT12[facility] [address] [address]Okbjq: [phone] Fax: [id number] - [place] - [address] [address]Fuail: [phone] Fax: [phone]--  [phone]

## 2025-04-23 ENCOUNTER — PATIENT MESSAGE (OUTPATIENT)
Dept: ORTHOPEDICS | Facility: CLINIC | Age: 70
End: 2025-04-23
Payer: MEDICARE

## 2025-04-23 DIAGNOSIS — M25.661 KNEE STIFFNESS, RIGHT: ICD-10-CM

## 2025-04-23 DIAGNOSIS — Z96.651 S/P TOTAL KNEE REPLACEMENT, RIGHT: ICD-10-CM

## 2025-04-23 RX ORDER — HYDROCODONE BITARTRATE AND ACETAMINOPHEN 10; 325 MG/1; MG/1
1 TABLET ORAL EVERY 6 HOURS PRN
Qty: 28 TABLET | Refills: 0 | Status: SHIPPED | OUTPATIENT
Start: 2025-04-23

## 2025-04-26 ENCOUNTER — PATIENT MESSAGE (OUTPATIENT)
Dept: ORTHOPEDICS | Facility: CLINIC | Age: 70
End: 2025-04-26
Payer: MEDICARE

## 2025-05-01 DIAGNOSIS — Z96.651 S/P TOTAL KNEE REPLACEMENT, RIGHT: Primary | ICD-10-CM

## 2025-05-20 ENCOUNTER — PATIENT MESSAGE (OUTPATIENT)
Dept: ORTHOPEDICS | Facility: CLINIC | Age: 70
End: 2025-05-20
Payer: MEDICARE

## 2025-05-20 DIAGNOSIS — Z96.651 S/P TOTAL KNEE REPLACEMENT, RIGHT: ICD-10-CM

## 2025-05-20 DIAGNOSIS — M25.661 KNEE STIFFNESS, RIGHT: ICD-10-CM

## 2025-05-20 RX ORDER — HYDROCODONE BITARTRATE AND ACETAMINOPHEN 10; 325 MG/1; MG/1
1 TABLET ORAL EVERY 6 HOURS PRN
Qty: 28 TABLET | Refills: 0 | Status: SHIPPED | OUTPATIENT
Start: 2025-05-20

## 2025-05-20 RX ORDER — ONDANSETRON 4 MG/1
4 TABLET, FILM COATED ORAL EVERY 6 HOURS PRN
Qty: 30 TABLET | Refills: 0 | Status: SHIPPED | OUTPATIENT
Start: 2025-05-20

## 2025-06-05 ENCOUNTER — OFFICE VISIT (OUTPATIENT)
Dept: ORTHOPEDICS | Facility: CLINIC | Age: 70
End: 2025-06-05
Payer: MEDICARE

## 2025-06-05 VITALS — RESPIRATION RATE: 16 BRPM

## 2025-06-05 DIAGNOSIS — Z96.651 S/P TOTAL KNEE REPLACEMENT, RIGHT: Primary | ICD-10-CM

## 2025-06-05 DIAGNOSIS — M25.661 KNEE STIFFNESS, RIGHT: ICD-10-CM

## 2025-06-05 PROCEDURE — 99214 OFFICE O/P EST MOD 30 MIN: CPT | Mod: S$PBB,,, | Performed by: ORTHOPAEDIC SURGERY

## 2025-06-05 PROCEDURE — 99999 PR PBB SHADOW E&M-EST. PATIENT-LVL II: CPT | Mod: PBBFAC,,, | Performed by: ORTHOPAEDIC SURGERY

## 2025-06-05 PROCEDURE — 99212 OFFICE O/P EST SF 10 MIN: CPT | Mod: PBBFAC,PO | Performed by: ORTHOPAEDIC SURGERY

## 2025-06-08 ENCOUNTER — PATIENT MESSAGE (OUTPATIENT)
Dept: ORTHOPEDICS | Facility: CLINIC | Age: 70
End: 2025-06-08
Payer: MEDICARE

## 2025-06-09 DIAGNOSIS — Z96.651 S/P TOTAL KNEE REPLACEMENT, RIGHT: Primary | ICD-10-CM

## 2025-06-12 ENCOUNTER — PATIENT MESSAGE (OUTPATIENT)
Dept: ORTHOPEDICS | Facility: CLINIC | Age: 70
End: 2025-06-12
Payer: MEDICARE

## 2025-06-12 ENCOUNTER — CLINICAL SUPPORT (OUTPATIENT)
Dept: REHABILITATION | Facility: HOSPITAL | Age: 70
End: 2025-06-12
Attending: ORTHOPAEDIC SURGERY
Payer: MEDICARE

## 2025-06-12 DIAGNOSIS — R29.898 DECREASED STRENGTH OF LOWER EXTREMITY: Primary | ICD-10-CM

## 2025-06-12 PROCEDURE — 97161 PT EVAL LOW COMPLEX 20 MIN: CPT | Mod: PN

## 2025-06-12 PROCEDURE — 97112 NEUROMUSCULAR REEDUCATION: CPT | Mod: PN

## 2025-06-12 PROCEDURE — 97110 THERAPEUTIC EXERCISES: CPT | Mod: PN

## 2025-06-12 NOTE — PROGRESS NOTES
Outpatient Rehab  Physical Therapy Evaluation    Patient Name: Abi Duran  MRN: [id number]  YOB: 1955  Encounter Date: 6/12/2025    Therapy Diagnosis:   Encounter Diagnosis   Name Primary?    Decreased strength of lower extremity Yes     Physician: Rod Orellana,*    Physician Orders: Eval and Treat  Medical Diagnosis: S/P total knee replacement, right  Surgical Diagnosis: Not applicable for this Episode   Surgical Date: Not applicable for this Episode    Visit # / Visits Authorized:  1 / 1  Insurance Authorization Period: 6/9/2025 to 6/9/2026  Date of Evaluation: 6/12/2025  Plan of Care Certification: 6/12/2025 to 8/8/2025     Time In: 1000   Time Out: 1054  Total Time (in minutes): 54   Total Billable Time (in minutes): 54    Intake Outcome Measure for FOTO Survey    Therapist reviewed FOTO scores for Abi Duran on 6/12/2025.   FOTO report - see Media section or FOTO account episode details.     Intake Score: 48%    Precautions:     Date of surgery: June 25, 2024 right total knee   September 10, 2024 right knee manipulation       Subjective   History of Present Illness  Abi is a 69 y.o. female who reports to physical therapy with a chief concern of R leg weakness..                 History of Present Condition/Illness: Pt states she broke her femur 2 years ago and had to wear a leg brace for 10 months to get it corrected. Pt states her leg atrophied at that time and eventually needed a manipulation to correct motion. Pt states following that her knee became degenerative and she needed a R TKA. Pt states she has been in therapy for a year but still feels behind. PT she saw previous told her they did all they could do. States knee aches and she cannot bend her knee back far enough. Pt states she is just sore all the time. Pt states knee swells when she is walking for too long. Pt states she is using pain meds for relief and compressive sleeve. Pt states a few weeks ago he leg  gave out when she got up she hit the coffee table and fractured a rib. Pt states she has not had any other falls. Pt states has 1 step to get inside her home and denies any difficulty with that. Pt states she does not use the cane at home but outside the home she uses it. She feels like she gets tired easily. Pt states she goes to the gym twice/week.     Activities of Daily Living  Social history was obtained from Patient.               Previously independent with activities of daily living? Yes     Currently independent with activities of daily living? Yes          Previously independent with instrumental activities of daily living? Yes     Currently independent with instrumental activities of daily living? Yes              Employment  Employment Status: Retired          Past Medical History/Physical Systems Review:   Abi Duran  has a past medical history of Allergy, Arthritis, Coronary artery disease, GERD (gastroesophageal reflux disease), Hypertension, and Ulcer.    Abi Duran  has a past surgical history that includes Hysterectomy; Knee surgery; TONSILLECTOMY, ADENOIDECTOMY, BILATERAL yringotomy and tubes; Cholecystectomy; Eye surgery; Tubal ligation; Cardiac surgery; robotic arthroplasty, knee (Right, 6/25/2024); Knee joint manipulation (Right, 9/10/2024); and Esophagogastroduodenoscopy (N/A, 11/11/2024).    Abi has a current medication list which includes the following prescription(s): boron, hydrocodone-acetaminophen, ondansetron, and UNABLE TO FIND.    Review of patient's allergies indicates:   Allergen Reactions    Amoxicillin Nausea Only     Cannot tolerate side effects    Doxycycline Nausea Only    Erythromycin Nausea Only     Other reaction(s): Nausea    Imitrex [sumatriptan succinate] Swelling and Palpitations     Other reaction(s): swelling  Other reaction(s): chest pains    Phenergan [promethazine] Other (See Comments)     Other reaction(s): Muscle pain  Muscle pain    Sulfa  (sulfonamide antibiotics) Nausea And Vomiting        Per MD note:   X-rays:  Four views of both knees are review which show  right total knee arthroplasty with some patellar tilt     Objective   Posture                 Moderate swelling around R knee.     Knee Observations  Right Knee Observations  Present: Straight Leg Raise Extensor Lag  Left Knee Observations  Not Present: Straight Leg Raise Extensor Lag             Knee Range of Motion   Right Knee   Active (deg) Passive (deg) Pain   Flexion 105 113 Yes   Extension -5 -2         Left Knee   Active (deg) Passive (deg) Pain   Flexion 125 130     Extension 0 2                        Hip Strength - Planes of Motion   Right Strength Right Pain Left Strength Left  Pain   Flexion (L2) 4-   5     Extension 3   4     ABduction 3+   4     ADduction 5   5     Internal Rotation           External Rotation               Knee Strength   Right Strength Right Pain Left Strength Left  Pain   Flexion (S2) 4   5     Prone Flexion           Extension (L3) 4-   5       Knee Extensor Lag  Lag Present: Right  No Lag: Left  Quad set: poor on R; good on L .     Ankle/Foot Strength - Planes of Motion   Right Strength Right Pain Left Strength Left  Pain   Dorsiflexion (L4) 5   5     Plantar Flexion (S1) 5   5     Inversion           Eversion           Great Toe Flexion           Great Toe Extension (L5)           Lesser Toes Flexion           Lesser Toes Extension                     Gait Analysis  Gait Analysis Details  Gait: consistent quad deficiency with slight bend in knee with R stance. TUs with SPC. 36s without SPC. 5xSTS: 20s without UE support.         Treatment:  Therapeutic Exercise  TE 1: Heel prop x 5 minutes;  TE 2: LS HS stretch 3 x 30s  TE 3: Education: use cane for stability with walking / HEP / POC  Balance/Neuromuscular Re-Education  NMR 1: NMES: Quad set 10s on/10s off x 5 minutes  NMR 2: NMES: LAQ isometric at 60 degrees 5s/10s off x 5 minutes  NMR 3: TKE on wall  with towel x 20; 5s holds    Time Entry(in minutes):  PT Evaluation (Low) Time Entry: 25  Neuromuscular Re-Education Time Entry: 15  Therapeutic Exercise Time Entry: 14    Assessment & Plan   Assessment  Abi presents with a condition of Low complexity.   Presentation of Symptoms: Stable       Functional Limitations: Activity tolerance, Functional mobility, Participating in leisure activities, Range of motion, Pain with ADLs/IADLs, Painful locomotion/ambulation, Gait limitations, Increased risk of fall, Proprioception, Standing tolerance  Impairments: Abnormal muscle firing, Activity intolerance, Abnormal or restricted range of motion, Impaired physical strength, Lack of appropriate home exercise program, Pain with functional activity  Personal Factors Affecting Prognosis: Pain    Patient Goal for Therapy (PT): get back to home exercise classes  Prognosis: Fair  Prognosis Details: Chronic pain; weakness  Assessment Details: Abi is a 69 y.o. female referred to outpatient Physical Therapy with a medical diagnosis of R TKA. Patient's signs and symptoms are consistent with quad deficiency leading to knee soreness and functional performance deficit. She presents with pain during standing/walking/transfers; limited knee ROM; LQ weakness; abnormal gait and functional limitations of difficulty standing/walking prolonged distances. Patient would benefit from skilled physical therapy to address these limitations and begin return to PLOF.      Plan  From a physical therapy perspective, the patient would benefit from: Skilled Rehab Services    Planned therapy interventions include: Therapeutic exercise, Therapeutic activities, Neuromuscular re-education, Manual therapy, ADLs/IADLs, and Gait training.    Planned modalities to include: Cryotherapy (cold pack).        Visit Frequency: 2 times Per Week for 8 Weeks.       This plan was discussed with Patient.   Discussion participants: Agreed Upon Plan of Care  Plan details:  Restore knee extension and quad control first then progress functional capacity           The patient's spiritual, cultural, and educational needs were considered, and the patient is agreeable to the plan of care and goals.     Education  Education was done with Patient. The patient's learning style includes Pictures/video, Listening, and Demonstration. The patient Demonstrates understanding and Verbalizes understanding.                 Goals:   Active       LTG       Pt will improve TUG to <12s without AD to demonstrate reduction in fall risk       Start:  06/12/25    Expected End:  08/08/25            Pt will improve 5xSTS to <14s without UE support       Start:  06/12/25    Expected End:  08/08/25            Pt will report no pain with ADL's and return to home exercise program        Start:  06/12/25    Expected End:  08/08/25               STG       Pt will report reduction in knee pain by 50%       Start:  06/12/25    Expected End:  07/10/25            Pt will demonstrate TKE with SLR and no lag to demonstrate improve quad function       Start:  06/12/25    Expected End:  07/10/25            Pt will improve TUG by 10s without AD       Start:  06/12/25    Expected End:  07/10/25                Sp Roberts, PT, DPT

## 2025-06-15 ENCOUNTER — PATIENT MESSAGE (OUTPATIENT)
Dept: ORTHOPEDICS | Facility: CLINIC | Age: 70
End: 2025-06-15
Payer: MEDICARE

## 2025-06-16 DIAGNOSIS — M25.661 KNEE STIFFNESS, RIGHT: ICD-10-CM

## 2025-06-16 DIAGNOSIS — Z96.651 S/P TOTAL KNEE REPLACEMENT, RIGHT: ICD-10-CM

## 2025-06-16 RX ORDER — HYDROCODONE BITARTRATE AND ACETAMINOPHEN 10; 325 MG/1; MG/1
1 TABLET ORAL EVERY 6 HOURS PRN
Qty: 28 TABLET | Refills: 0 | Status: SHIPPED | OUTPATIENT
Start: 2025-06-16

## 2025-06-17 ENCOUNTER — CLINICAL SUPPORT (OUTPATIENT)
Dept: REHABILITATION | Facility: HOSPITAL | Age: 70
End: 2025-06-17
Payer: MEDICARE

## 2025-06-17 DIAGNOSIS — R29.898 DECREASED STRENGTH OF LOWER EXTREMITY: Primary | ICD-10-CM

## 2025-06-17 PROCEDURE — 97112 NEUROMUSCULAR REEDUCATION: CPT | Mod: PN

## 2025-06-17 PROCEDURE — 97140 MANUAL THERAPY 1/> REGIONS: CPT | Mod: PN

## 2025-06-17 PROCEDURE — 97110 THERAPEUTIC EXERCISES: CPT | Mod: PN

## 2025-06-17 NOTE — PROGRESS NOTES
Outpatient Rehab  Physical Therapy Visit    Patient Name: Abi Duran  MRN: [id number]  YOB: 1955  Encounter Date: 6/17/2025    Therapy Diagnosis:   Encounter Diagnosis   Name Primary?    Decreased strength of lower extremity Yes     Physician: Rod Orellana,*    Physician Orders: Eval and Treat  Medical Diagnosis: S/P total knee replacement, right  Surgical Diagnosis: Not applicable for this Episode   Surgical Date: Not applicable for this Episode  Days Since Last Surgery: Not applicable for this Episode    Visit # / Visits Authorized:  1 / 20  Insurance Authorization Period: 6/9/2025 to 12/31/2025  Date of Evaluation: 6/12/2025  Plan of Care Certification: 6/12/2025 to 8/8/2025      PT/PTA:     Number of PTA visits since last PT visit:   Time In: 0900   Time Out: 0954  Total Time (in minutes): 54   Total Billable Time (in minutes): 54    FOTO:  Intake Score:  %  Survey Score 2:  %  Survey Score 3:  %    Precautions:       Subjective   Still in pain; very annoyed at lack of progress..  Pain reported as 0/10.      Objective            Treatment:  Therapeutic Exercise  TE 1: Heel prop x 5 minutes;  TE 2: LS HS stretch 3 x 30s  TE 3: Education: use cane for stability with walking / HEP / POC  TE 4: Incline gastroc stretch 3 x 30s  Manual Therapy  MT 1: Tibiofemoral A/P; grade 3  MT 2: Superior patella glide; grade 3  Balance/Neuromuscular Re-Education  NMR 1: NMES: Quad set 10s on/10s off x 10 minutes  NMR 2: NMES: LAQ isometric at 60 degrees 10s/10s off x 5 minutes  NMR 3: NMES: TKE RTB x 20; 5s holds  NMR 4: DL Shuttle 3 x 15 50#  NMR 5: SL Shuttle 3 x 10 37#  NMR 6: LAQ 10#; 3 x 8; 3s up/down    Time Entry(in minutes): 10 TE; 8 MT; 35 NMR        Assessment & Plan   Assessment:  Abi did well today. Medical team reached out regarding application of BFR but she was unable to tolerate the increased pressure. Otherwise, she did well today and reported decrease pain at end of session with  walking. Plan to continue to progress as tolerated.        The patient will continue to benefit from skilled outpatient physical therapy in order to address the deficits listed in the problem list on the initial evaluation, provide patient and family education, and maximize the patients level of independence in the home and community environments.     The patient's spiritual, cultural, and educational needs were considered, and the patient is agreeable to the plan of care and goals.           Plan:      Goals:   Active       LTG       Pt will improve TUG to <12s without AD to demonstrate reduction in fall risk       Start:  06/12/25    Expected End:  08/08/25            Pt will improve 5xSTS to <14s without UE support       Start:  06/12/25    Expected End:  08/08/25            Pt will report no pain with ADL's and return to home exercise program        Start:  06/12/25    Expected End:  08/08/25               STG       Pt will report reduction in knee pain by 50%       Start:  06/12/25    Expected End:  07/10/25            Pt will demonstrate TKE with SLR and no lag to demonstrate improve quad function       Start:  06/12/25    Expected End:  07/10/25            Pt will improve TUG by 10s without AD       Start:  06/12/25    Expected End:  07/10/25                Sp Roberts, PT, DPT

## 2025-06-19 ENCOUNTER — CLINICAL SUPPORT (OUTPATIENT)
Dept: REHABILITATION | Facility: HOSPITAL | Age: 70
End: 2025-06-19
Payer: MEDICARE

## 2025-06-19 DIAGNOSIS — R29.898 DECREASED STRENGTH OF LOWER EXTREMITY: Primary | ICD-10-CM

## 2025-06-19 PROCEDURE — 97112 NEUROMUSCULAR REEDUCATION: CPT | Mod: PN

## 2025-06-19 PROCEDURE — 97110 THERAPEUTIC EXERCISES: CPT | Mod: PN

## 2025-06-19 PROCEDURE — 97530 THERAPEUTIC ACTIVITIES: CPT | Mod: PN

## 2025-06-19 NOTE — PROGRESS NOTES
Outpatient Rehab  Physical Therapy Visit    Patient Name: Abi Duran  MRN: [id number]  YOB: 1955  Encounter Date: 6/19/2025    Therapy Diagnosis:   Encounter Diagnosis   Name Primary?    Decreased strength of lower extremity Yes     Physician: Rod Orellana,*    Physician Orders: Eval and Treat  Medical Diagnosis: S/P total knee replacement, right  Surgical Diagnosis: Not applicable for this Episode   Surgical Date: Not applicable for this Episode  Days Since Last Surgery: Not applicable for this Episode    Visit # / Visits Authorized:  2 / 20  Insurance Authorization Period: 6/9/2025 to 12/31/2025  Date of Evaluation: 6/12/2025  Plan of Care Certification: 6/12/2025 to 8/8/2025      PT/PTA:     Number of PTA visits since last PT visit:   Time In: 1100   Time Out: 1153  Total Time (in minutes): 53   Total Billable Time (in minutes): 53    FOTO:  Intake Score:  %  Survey Score 2:  %  Survey Score 3:  %    Precautions:       Subjective   Felt better after last session; less pain..  Pain reported as 2/10.      Objective            Treatment:  Therapeutic Exercise  TE 1: Heel prop x 5 minutes;  TE 2: LS HS stretch 3 x 30s  TE 3: Education: use cane for stability with walking / HEP / POC  Balance/Neuromuscular Re-Education  NMR 1: NMES: Quad set 10s on/10s off x 8 minutes  NMR 2: NMES: LAQ isometric at 60 degrees 10s/10s off x 5 minutes  NMR 3: NMES: TKE GTB x 20; 5s holds  NMR 4: DL Shuttle 4 x 8 62#  NMR 5: SL Shuttle 4 x 8 37#  NMR 6: LAQ 10#; 4 x 8; 3s up/down  Therapeutic Activity  TA 1: Backwards Walking on turf 3 x 15 yards  TA 2: DL Mini Squat 3 x 8    Time Entry(in minutes): 12 TE 33 NMR; 8 TA       Assessment & Plan   Assessment:  Abi did well today. Continues to work toward improve TKE in gait and functional movements, as well as quad loading. She continues to struggle with quad set without NMES. Plan to continue to progress as tolerated.        The patient will continue to  benefit from skilled outpatient physical therapy in order to address the deficits listed in the problem list on the initial evaluation, provide patient and family education, and maximize the patients level of independence in the home and community environments.     The patient's spiritual, cultural, and educational needs were considered, and the patient is agreeable to the plan of care and goals.           Plan:  progressively load pain-free    Goals:   Active       LTG       Pt will improve TUG to <12s without AD to demonstrate reduction in fall risk       Start:  06/12/25    Expected End:  08/08/25            Pt will improve 5xSTS to <14s without UE support       Start:  06/12/25    Expected End:  08/08/25            Pt will report no pain with ADL's and return to home exercise program        Start:  06/12/25    Expected End:  08/08/25               STG       Pt will report reduction in knee pain by 50%       Start:  06/12/25    Expected End:  07/10/25            Pt will demonstrate TKE with SLR and no lag to demonstrate improve quad function       Start:  06/12/25    Expected End:  07/10/25            Pt will improve TUG by 10s without AD       Start:  06/12/25    Expected End:  07/10/25                Sp Roberts, PT, DPT

## 2025-06-24 ENCOUNTER — CLINICAL SUPPORT (OUTPATIENT)
Dept: REHABILITATION | Facility: HOSPITAL | Age: 70
End: 2025-06-24
Payer: MEDICARE

## 2025-06-24 DIAGNOSIS — R29.898 DECREASED STRENGTH OF LOWER EXTREMITY: Primary | ICD-10-CM

## 2025-06-24 PROCEDURE — 97110 THERAPEUTIC EXERCISES: CPT | Mod: PN,CQ

## 2025-06-24 PROCEDURE — 97112 NEUROMUSCULAR REEDUCATION: CPT | Mod: PN,CQ

## 2025-06-24 PROCEDURE — 97014 ELECTRIC STIMULATION THERAPY: CPT | Mod: PN,CQ

## 2025-06-24 PROCEDURE — 97530 THERAPEUTIC ACTIVITIES: CPT | Mod: PN,CQ

## 2025-06-24 NOTE — PROGRESS NOTES
Outpatient Rehab  Physical Therapy Visit    Patient Name: Abi Duran  MRN: [id number]  YOB: 1955  Encounter Date: 6/24/2025    Therapy Diagnosis:   Encounter Diagnosis   Name Primary?    Decreased strength of lower extremity Yes     Physician: Rod Orellana,*    Physician Orders: Eval and Treat  Medical Diagnosis: S/P total knee replacement, right  Surgical Diagnosis: Not applicable for this Episode   Surgical Date: Not applicable for this Episode  Days Since Last Surgery: Not applicable for this Episode    Visit # / Visits Authorized:  3 / 20  Insurance Authorization Period: 6/9/2025 to 12/31/2025  Date of Evaluation: 6/12/2025  Plan of Care Certification: 6/12/2025 to 8/8/2025      PT/PTA:     Number of PTA visits since last PT visit:   Time In: 1000   Time Out: 1100  Total Time (in minutes): 60   Total Billable Time (in minutes): 53    FOTO:  Intake Score:  %  Survey Score 2:  %  Survey Score 3:  %    Precautions:       Subjective   Usually pain is better in the morning but this  morning its hurting pretty bad. Reports he knee buckled on her completely but she was able to catch herself. Denies falling. Reports her knee will swell and pain will increase significantly when this happens..  Pain reported as 6/10.      Objective            Treatment:  Therapeutic Exercise  TE 1: Heel prop x 5 minutes 10 lbs above and below  TE 2: LS HS stretch 3 x 30s  Balance/Neuromuscular Re-Education  NMR 1: NMES: Quad set 10s on/10s off x 8 minutes  NMR 2: NMES: LAQ isometric at 60 degrees 10s/10s off x 5 minutes  NMR 3: NMES: TKE GTB x 20; 5s holds  NMR 4: DL Shuttle 4 x 8 62#  NMR 5: SL Shuttle 4 x 8 37#  NMR 6: LAQ 10#; 4 x 8; 3s up/down  Therapeutic Activity  TA 1: Backwards Walking on turf 3 x 15 yards  TA 2: DL Mini Squat 3 x 8    Time Entry(in minutes): 12 TE 33 NMR; 8 TA  E-Stim (Unattended) Time Entry: 30  Neuromuscular Re-Education Time Entry: 33  Therapeutic Activity Time Entry:  8  Therapeutic Exercise Time Entry: 12    Assessment & Plan   Assessment:  Abi tolerated progress heel prop this date. She was able to get to 0 degrees extension post. Continued neuromuscular electrical stimulation to improve quad activation. Verbalized less symptoms following therapy with neuromuscular electrical stimulation. She will continue to benefit from skilled Physical Therapy to maximize strength gains as able to allow her to return to prior level of function.     The patient will continue to benefit from skilled outpatient physical therapy in order to address the deficits listed in the problem list on the initial evaluation, provide patient and family education, and maximize the patients level of independence in the home and community environments.     The patient's spiritual, cultural, and educational needs were considered, and the patient is agreeable to the plan of care and goals.           Plan:  progressively load pain-free    Goals:   Active       LTG       Pt will improve TUG to <12s without AD to demonstrate reduction in fall risk (Progressing)       Start:  06/12/25    Expected End:  08/08/25            Pt will improve 5xSTS to <14s without UE support (Progressing)       Start:  06/12/25    Expected End:  08/08/25            Pt will report no pain with ADL's and return to home exercise program  (Progressing)       Start:  06/12/25    Expected End:  08/08/25               STG       Pt will report reduction in knee pain by 50% (Progressing)       Start:  06/12/25    Expected End:  07/10/25            Pt will demonstrate TKE with SLR and no lag to demonstrate improve quad function (Progressing)       Start:  06/12/25    Expected End:  07/10/25            Pt will improve TUG by 10s without AD (Progressing)       Start:  06/12/25    Expected End:  07/10/25                Paris Bennett, HELEN

## 2025-06-26 ENCOUNTER — CLINICAL SUPPORT (OUTPATIENT)
Dept: REHABILITATION | Facility: HOSPITAL | Age: 70
End: 2025-06-26
Payer: MEDICARE

## 2025-06-26 DIAGNOSIS — R29.898 DECREASED STRENGTH OF LOWER EXTREMITY: Primary | ICD-10-CM

## 2025-06-26 PROCEDURE — 97110 THERAPEUTIC EXERCISES: CPT | Mod: PN

## 2025-06-26 PROCEDURE — 97530 THERAPEUTIC ACTIVITIES: CPT | Mod: PN

## 2025-06-26 PROCEDURE — 97112 NEUROMUSCULAR REEDUCATION: CPT | Mod: PN

## 2025-06-26 NOTE — PROGRESS NOTES
Outpatient Rehab  Physical Therapy Visit    Patient Name: Abi Duran  MRN: [id number]  YOB: 1955  Encounter Date: 6/26/2025    Therapy Diagnosis:   Encounter Diagnosis   Name Primary?    Decreased strength of lower extremity Yes     Physician: Rod Orellana,*    Physician Orders: Eval and Treat  Medical Diagnosis: S/P total knee replacement, right  Surgical Diagnosis: Not applicable for this Episode   Surgical Date: Not applicable for this Episode  Days Since Last Surgery: Not applicable for this Episode    Visit # / Visits Authorized:  4 / 20  Insurance Authorization Period: 6/9/2025 to 12/31/2025  Date of Evaluation: 6/12/2025  Plan of Care Certification: 6/12/2025 to 8/8/2025      PT/PTA:     Number of PTA visits since last PT visit:   Time In: 1000   Time Out: 1054  Total Time (in minutes): 54   Total Billable Time (in minutes): 54    FOTO:  Intake Score:  %  Survey Score 2:  %  Survey Score 3:  %    Precautions:       Subjective   Still walking through pain. Frustrated..  Pain reported as 0/10.      Objective            Treatment:  Therapeutic Exercise  TE 1: Heel prop x 5 minutes 5 lbs above and below  TE 2: LS HS stretch 3 x 30s  TE 3: NuStep 6 minutes; Level 2  Balance/Neuromuscular Re-Education  NMR 1: NMES: Quad set 10s on/10s off x 8 minutes  NMR 2: NMES: SLR x 3 minutes  NMR 3: NMES: TKE BTB x 20; 5s holds  NMR 4: Donkey Kicks 0# added 3 x 8  NMR 5: Seated Hip Abd BTB x 20 B  NMR 6: LAQ 10#; 3 x 8; 3s up/down  Therapeutic Activity  TA 1: DL Squats 20inch Box Equal Foot Placement 2 x 5  TA 2: DL Squats 20inch Box LLE forward 2 x 5    Time Entry(in minutes): 15 TE 31 NMR; 8 TA       Assessment & Plan   Assessment:  Abi showing improved tolerance to closed chain activity. Requires form correction for sit<>stands. She reports more muscle burn in recent weeks. Advised she not walk with >3/10 pain level to reduce spikes in irritability.     The patient will continue to benefit  from skilled outpatient physical therapy in order to address the deficits listed in the problem list on the initial evaluation, provide patient and family education, and maximize the patients level of independence in the home and community environments.     The patient's spiritual, cultural, and educational needs were considered, and the patient is agreeable to the plan of care and goals.           Plan:  progressively load pain-free    Goals:   Active       LTG       Pt will improve TUG to <12s without AD to demonstrate reduction in fall risk (Progressing)       Start:  06/12/25    Expected End:  08/08/25            Pt will improve 5xSTS to <14s without UE support (Progressing)       Start:  06/12/25    Expected End:  08/08/25            Pt will report no pain with ADL's and return to home exercise program  (Progressing)       Start:  06/12/25    Expected End:  08/08/25               STG       Pt will report reduction in knee pain by 50% (Progressing)       Start:  06/12/25    Expected End:  07/10/25            Pt will demonstrate TKE with SLR and no lag to demonstrate improve quad function (Progressing)       Start:  06/12/25    Expected End:  07/10/25            Pt will improve TUG by 10s without AD (Progressing)       Start:  06/12/25    Expected End:  07/10/25                Sp Roberts, PT, DPT

## 2025-07-01 ENCOUNTER — CLINICAL SUPPORT (OUTPATIENT)
Dept: REHABILITATION | Facility: HOSPITAL | Age: 70
End: 2025-07-01
Payer: MEDICARE

## 2025-07-01 DIAGNOSIS — R29.898 DECREASED STRENGTH OF LOWER EXTREMITY: Primary | ICD-10-CM

## 2025-07-01 PROCEDURE — 97530 THERAPEUTIC ACTIVITIES: CPT | Mod: PN

## 2025-07-01 PROCEDURE — 97112 NEUROMUSCULAR REEDUCATION: CPT | Mod: PN

## 2025-07-01 PROCEDURE — 97110 THERAPEUTIC EXERCISES: CPT | Mod: PN

## 2025-07-01 NOTE — PROGRESS NOTES
Outpatient Rehab  Physical Therapy Visit    Patient Name: Abi Duran  MRN: [id number]  YOB: 1955  Encounter Date: 7/1/2025    Therapy Diagnosis:   Encounter Diagnosis   Name Primary?    Decreased strength of lower extremity Yes     Physician: Rod Orellana,*    Physician Orders: Eval and Treat  Medical Diagnosis: S/P total knee replacement, right  Surgical Diagnosis: Not applicable for this Episode   Surgical Date: Not applicable for this Episode  Days Since Last Surgery: Not applicable for this Episode    Visit # / Visits Authorized:  5 / 20  Insurance Authorization Period: 6/9/2025 to 12/31/2025  Date of Evaluation: 6/12/2025  Plan of Care Certification: 6/12/2025 to 8/8/2025      PT/PTA:     Number of PTA visits since last PT visit:   Time In: 1000   Time Out: 1055  Total Time (in minutes): 55   Total Billable Time (in minutes): 55    FOTO:  Intake Score:  %  Survey Score 2:  %  Survey Score 3:  %    Precautions:       Subjective   Walking better. Less pain today..  Pain reported as 0/10.      Objective    Arrives lacking 2 degrees from terminal knee extension        Treatment:  Therapeutic Exercise  TE 1: Heel prop x 5 minutes 5 lbs above and below  TE 2: LS HS stretch 3 x 30s  Balance/Neuromuscular Re-Education  NMR 1: NMES: Quad set 10s on/10s off x 5 minutes  NMR 2: NMES: SLR x 3 minutes  NMR 3: NMES: TKE BTB x 20; 5s holds  NMR 4: Donkey Kicks 0# added 3 x 8  NMR 6: LAQ 10#; 3 x 8; 3s up/down  Therapeutic Activity  TA 1: DL Squats 20inch Box Equal Foot Placement 3 x 8; holding 10# KB  TA 2: DL Squats 24inch Box LLE forward 2 x fatigue  Lateral Step Up 3 x 8 with UE support as needed   Sled Push Fwd/Bwd 4 x 5 yards     Time Entry(in minutes): 10 TE (includes education on load management) 20 NMR; 25 TA       Assessment & Plan   Assessment:  Abi arrives with decreased pain and improved tolerance to closed chain loading. Gait mechanics are improved as well. She requires less  cueing for quad setting. Still low tolerance for NMES intensity.     The patient will continue to benefit from skilled outpatient physical therapy in order to address the deficits listed in the problem list on the initial evaluation, provide patient and family education, and maximize the patients level of independence in the home and community environments.     The patient's spiritual, cultural, and educational needs were considered, and the patient is agreeable to the plan of care and goals.           Plan:  progressively load pain-free    Goals:   Active       LTG       Pt will improve TUG to <12s without AD to demonstrate reduction in fall risk (Progressing)       Start:  06/12/25    Expected End:  08/08/25            Pt will improve 5xSTS to <14s without UE support (Progressing)       Start:  06/12/25    Expected End:  08/08/25            Pt will report no pain with ADL's and return to home exercise program  (Progressing)       Start:  06/12/25    Expected End:  08/08/25               STG       Pt will report reduction in knee pain by 50% (Progressing)       Start:  06/12/25    Expected End:  07/10/25            Pt will demonstrate TKE with SLR and no lag to demonstrate improve quad function (Progressing)       Start:  06/12/25    Expected End:  07/10/25            Pt will improve TUG by 10s without AD (Progressing)       Start:  06/12/25    Expected End:  07/10/25                Sp Roberts, PT, DPT

## 2025-07-03 ENCOUNTER — CLINICAL SUPPORT (OUTPATIENT)
Dept: REHABILITATION | Facility: HOSPITAL | Age: 70
End: 2025-07-03
Payer: MEDICARE

## 2025-07-03 DIAGNOSIS — R29.898 DECREASED STRENGTH OF LOWER EXTREMITY: Primary | ICD-10-CM

## 2025-07-03 PROCEDURE — 97112 NEUROMUSCULAR REEDUCATION: CPT | Mod: PN

## 2025-07-03 PROCEDURE — 97110 THERAPEUTIC EXERCISES: CPT | Mod: PN

## 2025-07-03 PROCEDURE — 97530 THERAPEUTIC ACTIVITIES: CPT | Mod: PN

## 2025-07-03 NOTE — PROGRESS NOTES
Outpatient Rehab  Physical Therapy Visit    Patient Name: Abi Duran  MRN: [id number]  YOB: 1955  Encounter Date: 7/3/2025    Therapy Diagnosis:   Encounter Diagnosis   Name Primary?    Decreased strength of lower extremity Yes     Physician: Rod Orellana,*    Physician Orders: Eval and Treat  Medical Diagnosis: S/P total knee replacement, right  Surgical Diagnosis: Not applicable for this Episode   Surgical Date: Not applicable for this Episode  Days Since Last Surgery: Not applicable for this Episode    Visit # / Visits Authorized:  6 / 20  Insurance Authorization Period: 6/9/2025 to 12/31/2025  Date of Evaluation: 6/12/2025  Plan of Care Certification: 6/12/2025 to 8/8/2025      PT/PTA:     Number of PTA visits since last PT visit:   Time In: 1000   Time Out: 1053  Total Time (in minutes): 53   Total Billable Time (in minutes): 53    FOTO:  Intake Score:  %  Survey Score 2:  %  Survey Score 3:  %    Precautions:       Subjective   Very sore after last session; feeling better right now..  Pain reported as 0/10.      Objective    Arrives lacking 2 degrees from terminal knee extension    SLR: no lag for 5 reps in supine.     Treatment:  Therapeutic Exercise  TE 1: Heel prop x 5 minutes 5 lbs above and below  TE 2: LS HS stretch 3 x 30s  Balance/Neuromuscular Re-Education  NMR 1: NMES: Quad set 10s on/10s off x 5 minutes  NMR 2: NMES: SLR x 3 minutes  NMR 3: NMES: TKE BTB x 20; 5s holds  NMR 4: Donkey Kicks 0# added 3 x 8  NMR 6: LAQ 10#; 3 x 8; 3s up/down  Therapeutic Activity  TA 1: DL Squats 20inch Box Equal Foot Placement 3 x 8; holding 10# KB  Lateral Step Up 3 x 8 with UE support as needed   Sled Push Fwd/Bwd 2 x 5 yards   SL Leg Press 25# 3 x fatigue     Time Entry(in minutes): 9 TE (includes education on load management) 24 NMR; 20 TA       Assessment & Plan   Assessment:  Abi continues to report improvement over past month. Able to complete SLR with no lag for 5 reps. Plan  to continue to progress as tolerated.     The patient will continue to benefit from skilled outpatient physical therapy in order to address the deficits listed in the problem list on the initial evaluation, provide patient and family education, and maximize the patients level of independence in the home and community environments.     The patient's spiritual, cultural, and educational needs were considered, and the patient is agreeable to the plan of care and goals.           Plan:  progressively load pain-free    Goals:   Active       LTG       Pt will improve TUG to <12s without AD to demonstrate reduction in fall risk (Progressing)       Start:  06/12/25    Expected End:  08/08/25            Pt will improve 5xSTS to <14s without UE support (Progressing)       Start:  06/12/25    Expected End:  08/08/25            Pt will report no pain with ADL's and return to home exercise program  (Progressing)       Start:  06/12/25    Expected End:  08/08/25               STG       Pt will report reduction in knee pain by 50% (Progressing)       Start:  06/12/25    Expected End:  07/10/25            Pt will demonstrate TKE with SLR and no lag to demonstrate improve quad function (Progressing)       Start:  06/12/25    Expected End:  07/10/25            Pt will improve TUG by 10s without AD (Progressing)       Start:  06/12/25    Expected End:  07/10/25                Sp Roberts, PT, DPT

## 2025-07-08 ENCOUNTER — CLINICAL SUPPORT (OUTPATIENT)
Dept: REHABILITATION | Facility: HOSPITAL | Age: 70
End: 2025-07-08
Payer: MEDICARE

## 2025-07-08 DIAGNOSIS — R29.898 DECREASED STRENGTH OF LOWER EXTREMITY: Primary | ICD-10-CM

## 2025-07-08 PROCEDURE — 97112 NEUROMUSCULAR REEDUCATION: CPT | Mod: PN

## 2025-07-08 PROCEDURE — 97530 THERAPEUTIC ACTIVITIES: CPT | Mod: PN

## 2025-07-08 PROCEDURE — 97110 THERAPEUTIC EXERCISES: CPT | Mod: PN

## 2025-07-08 NOTE — PROGRESS NOTES
Outpatient Rehab  Physical Therapy Visit    Patient Name: Abi Duran  MRN: [id number]  YOB: 1955  Encounter Date: 7/8/2025    Therapy Diagnosis:   Encounter Diagnosis   Name Primary?    Decreased strength of lower extremity Yes     Physician: Rod Orellana,*    Physician Orders: Eval and Treat  Medical Diagnosis: S/P total knee replacement, right  Surgical Diagnosis: Not applicable for this Episode   Surgical Date: Not applicable for this Episode  Days Since Last Surgery: Not applicable for this Episode    Visit # / Visits Authorized:  7 / 20  Insurance Authorization Period: 6/9/2025 to 12/31/2025  Date of Evaluation: 6/12/2025  Plan of Care Certification: 6/12/2025 to 8/8/2025      PT/PTA:     Number of PTA visits since last PT visit:   Time In: 1000   Time Out: 1054  Total Time (in minutes): 54   Total Billable Time (in minutes): 54    FOTO:  Intake Score:  %  Survey Score 2:  %  Survey Score 3:  %    Precautions:     Subjective   Feeling better.  Pain reported as 0/10.      Objective    Arrives lacking 2-3 degrees from terminal knee extension    SLR: no lag     Treatment:  Therapeutic Exercise  TE 1: Heel prop x 5 minutes 5 lbs above and below  TE 2: LS HS stretch 3 x 30s  TE 3: NuStep 8 minutes; Level 2  Balance/Neuromuscular Re-Education  NMR 1: Quad set + SLR x 20 total  NMR 2: SL Hip Abd 3 x 10  NMR 3: DL Bridge 3 x 10; 3s holds  NMR 4: Donkey Kicks 12# added 3 x 8  NMR 6: LAQ 10#; 3 x 8; 3s up/down  Therapeutic Activity  TA 1: DL Squats 20inch Box Equal Foot Placement 3 x 8; holding 10# KB  TA 2: Modified Split Squat at mirror 3 x 8  Sled Push Fwd/Bwd 2 x 5 yards   SL Leg Press 25# 3 x fatigue     Time Entry(in minutes): 15 TE (includes education on load management) 16 NMR; 23 TA       Assessment & Plan   Assessment:  Abi making progress functionally and also reporting subjective progress. Plan to continue to progress as tolerated. 1 UE support required for closed chain  activity.     The patient will continue to benefit from skilled outpatient physical therapy in order to address the deficits listed in the problem list on the initial evaluation, provide patient and family education, and maximize the patients level of independence in the home and community environments.     The patient's spiritual, cultural, and educational needs were considered, and the patient is agreeable to the plan of care and goals.         Plan:  progressively load pain-free    Goals:   Active       LTG       Pt will improve TUG to <12s without AD to demonstrate reduction in fall risk (Progressing)       Start:  06/12/25    Expected End:  08/08/25            Pt will improve 5xSTS to <14s without UE support (Progressing)       Start:  06/12/25    Expected End:  08/08/25            Pt will report no pain with ADL's and return to home exercise program  (Progressing)       Start:  06/12/25    Expected End:  08/08/25               STG       Pt will report reduction in knee pain by 50% (Progressing)       Start:  06/12/25    Expected End:  07/10/25            Pt will demonstrate TKE with SLR and no lag to demonstrate improve quad function (Progressing)       Start:  06/12/25    Expected End:  07/10/25            Pt will improve TUG by 10s without AD (Progressing)       Start:  06/12/25    Expected End:  07/10/25                Sp Roberts, PT, DPT

## 2025-07-10 ENCOUNTER — CLINICAL SUPPORT (OUTPATIENT)
Dept: REHABILITATION | Facility: HOSPITAL | Age: 70
End: 2025-07-10
Payer: MEDICARE

## 2025-07-10 DIAGNOSIS — R29.898 DECREASED STRENGTH OF LOWER EXTREMITY: Primary | ICD-10-CM

## 2025-07-10 PROCEDURE — 97112 NEUROMUSCULAR REEDUCATION: CPT | Mod: PN

## 2025-07-10 PROCEDURE — 97530 THERAPEUTIC ACTIVITIES: CPT | Mod: PN

## 2025-07-10 NOTE — PROGRESS NOTES
Outpatient Rehab  Physical Therapy Visit    Patient Name: Abi Duran  MRN: [id number]  YOB: 1955  Encounter Date: 7/10/2025    Therapy Diagnosis:   Encounter Diagnosis   Name Primary?    Decreased strength of lower extremity Yes     Physician: Rod Orellana,*    Physician Orders: Eval and Treat  Medical Diagnosis: S/P total knee replacement, right  Surgical Diagnosis: Not applicable for this Episode   Surgical Date: Not applicable for this Episode  Days Since Last Surgery: Not applicable for this Episode    Visit # / Visits Authorized:  8 / 20  Insurance Authorization Period: 6/9/2025 to 12/31/2025  Date of Evaluation: 6/12/2025  Plan of Care Certification: 6/12/2025 to 8/8/2025      PT/PTA:     Number of PTA visits since last PT visit:   Time In: 1000   Time Out: 1053  Total Time (in minutes): 53   Total Billable Time (in minutes): 24    FOTO:  Intake Score:  %  Survey Score 2:  %  Survey Score 3:  %    Precautions:     Subjective   Abi reports she is feeling improvement; sore after last session as she expected..  Pain reported as 0/10.      Objective    Arrives lacking 2-3 degrees from terminal knee extension    SLR: no lag     Treatment:  Therapeutic Exercise  TE 1: Heel prop x 5 minutes 5 lbs above and below  TE 2: LS HS stretch 3 x 30s  TE 3: NuStep 8 minutes; Level 2  TE 4: SL Shuttle 25# 3 x 15 for endurance  Balance/Neuromuscular Re-Education  NMR 1: Quad set + SLR x 10 total; 1# - cueing for core control.  NMR 2: SL Hip Abd 3 x 10  NMR 3: DL Bridge 3 x 8; 3s holds  NMR 4: Seated HS curls 30# DL; 3 x 10  NMR 6: DL LAQ 10#; 3 x 8; 3s up/down  Therapeutic Activity  TA 1: Side Steps at railing 4 x 5 yards  TA 2: DL Squat 10# KB 3 x 5      Time Entry(in minutes): 22 TE (includes education on load management) 23 NMR; 8 TA     Assessment & Plan   Assessment:  Abi did well today but was fatigue due to heavy loading earlier in the week. We worked more on endurance today. She  required cueing for hip control and appropriate foot placement for squats. Plan to continue to progress as tolerated.     The patient will continue to benefit from skilled outpatient physical therapy in order to address the deficits listed in the problem list on the initial evaluation, provide patient and family education, and maximize the patients level of independence in the home and community environments.     The patient's spiritual, cultural, and educational needs were considered, and the patient is agreeable to the plan of care and goals.         Plan:  progressively load pain-free    Goals:   Active       LTG       Pt will improve TUG to <12s without AD to demonstrate reduction in fall risk (Progressing)       Start:  06/12/25    Expected End:  08/08/25            Pt will improve 5xSTS to <14s without UE support (Progressing)       Start:  06/12/25    Expected End:  08/08/25            Pt will report no pain with ADL's and return to home exercise program  (Progressing)       Start:  06/12/25    Expected End:  08/08/25               STG       Pt will report reduction in knee pain by 50% (Progressing)       Start:  06/12/25    Expected End:  07/10/25            Pt will demonstrate TKE with SLR and no lag to demonstrate improve quad function (Progressing)       Start:  06/12/25    Expected End:  07/10/25            Pt will improve TUG by 10s without AD (Progressing)       Start:  06/12/25    Expected End:  07/10/25                Sp Roberts, PT, DPT

## 2025-07-15 ENCOUNTER — CLINICAL SUPPORT (OUTPATIENT)
Dept: REHABILITATION | Facility: HOSPITAL | Age: 70
End: 2025-07-15
Payer: MEDICARE

## 2025-07-15 DIAGNOSIS — R29.898 DECREASED STRENGTH OF LOWER EXTREMITY: Primary | ICD-10-CM

## 2025-07-15 PROCEDURE — 97530 THERAPEUTIC ACTIVITIES: CPT | Mod: PN

## 2025-07-15 PROCEDURE — 97112 NEUROMUSCULAR REEDUCATION: CPT | Mod: PN

## 2025-07-15 PROCEDURE — 97110 THERAPEUTIC EXERCISES: CPT | Mod: PN

## 2025-07-15 NOTE — PROGRESS NOTES
Outpatient Rehab    Physical Therapy Progress Note    Patient Name: Abi Duran  MRN: [id number]  YOB: 1955  Encounter Date: 7/15/2025    Therapy Diagnosis:   Encounter Diagnosis   Name Primary?    Decreased strength of lower extremity Yes     Physician: Rod Orellana,*    Physician Orders: Eval and Treat  Medical Diagnosis: S/P total knee replacement, right  Surgical Diagnosis: Not applicable for this Episode   Surgical Date: Not applicable for this Episode  Days Since Last Surgery: Not applicable for this Episode    Visit # / Visits Authorized:  9 / 20  Insurance Authorization Period: 6/9/2025 to 12/31/2025  Date of Evaluation: 6/12/2025  Plan of Care Certification: 6/12/2025 to 8/8/2025      PT/PTA:     Number of PTA visits since last PT visit:   Time In:     Time Out:    Total Time (in minutes):     Total Billable Time (in minutes):      FOTO:  Intake Score: 48%  Survey Score 2: Not applicable for this Episode%  Survey Score 3: Not applicable for this Episode%    Precautions:       Subjective   Sore from a busy weekend with family..  Pain reported as 2/10.      Objective    Arrives lacking 1 degree from TKE;     Quad set: good  SLR: able with no lag  Gait: decreased step length on LLE.  TUG: 10s without AD    Treatment:  Therapeutic Exercise  TE 1: Heel prop x 5 minutes 5 lbs above and below  TE 2: LS HS stretch 3 x 30s  TE 3: NuStep 8 minutes; Level 2  TE 4: Self mob into hip extension x 30; 5s holds  Balance/Neuromuscular Re-Education  NMR 1: Quad set + SLR x 15 total;- cueing for core control.  NMR 2: SL Hip Abd 3 x 10  NMR 3: DL Bridge 3 x 8; 3s holds  NMR 4: Seated HS curls 35# DL; 3 x 10  NMR 6: DL LAQ 10#; 3 x 8; 3s up/down  Therapeutic Activity  TA 1: DL Shuttle 50# 3 x 10  TA 2: SL Shuttle 4 x 8 37#  Walking with education on step length on turf 4 x 25 yards    Time Entry(in minutes): 25 TE; 18 NMR; 10 TA         Assessment & Plan   Assessment:  Abi has made significant  progress over this POC. She has all STG's. She has substantially improved her TUG score and gait speed. Continue to educated her on taking a bigger step on LLE to improve stance phase and TKE on RLE. She continue to be a candidate for further therapy due to weakness and endurance deficits/inability to stand from long durations. Plan to continue to progressively load as tolerated.        The patient will continue to benefit from skilled outpatient physical therapy in order to address the deficits listed in the problem list on the initial evaluation, provide patient and family education, and maximize the patients level of independence in the home and community environments.     The patient's spiritual, cultural, and educational needs were considered, and the patient is agreeable to the plan of care and goals.           Plan:      Goals:   Active       LTG       Pt will improve TUG to <12s without AD to demonstrate reduction in fall risk (Progressing)       Start:  06/12/25    Expected End:  08/08/25            Pt will improve 5xSTS to <14s without UE support (Progressing)       Start:  06/12/25    Expected End:  08/08/25            Pt will report no pain with ADL's and return to home exercise program  (Progressing)       Start:  06/12/25    Expected End:  08/08/25              Resolved       STG       Pt will report reduction in knee pain by 50% (Met)       Start:  06/12/25    Expected End:  07/10/25    Resolved:  07/15/25         Pt will demonstrate TKE with SLR and no lag to demonstrate improve quad function (Met)       Start:  06/12/25    Expected End:  07/10/25    Resolved:  07/15/25         Pt will improve TUG by 10s without AD (Met)       Start:  06/12/25    Expected End:  07/10/25    Resolved:  07/15/25             Sp Roberts, PT, DPT

## 2025-07-21 ENCOUNTER — PATIENT MESSAGE (OUTPATIENT)
Dept: ORTHOPEDICS | Facility: CLINIC | Age: 70
End: 2025-07-21
Payer: MEDICARE

## 2025-07-22 ENCOUNTER — CLINICAL SUPPORT (OUTPATIENT)
Dept: REHABILITATION | Facility: HOSPITAL | Age: 70
End: 2025-07-22
Payer: MEDICARE

## 2025-07-22 DIAGNOSIS — R29.898 DECREASED STRENGTH OF LOWER EXTREMITY: Primary | ICD-10-CM

## 2025-07-22 DIAGNOSIS — M25.661 KNEE STIFFNESS, RIGHT: ICD-10-CM

## 2025-07-22 DIAGNOSIS — Z96.651 S/P TOTAL KNEE REPLACEMENT, RIGHT: ICD-10-CM

## 2025-07-22 PROCEDURE — 97110 THERAPEUTIC EXERCISES: CPT | Mod: PN,CQ

## 2025-07-22 PROCEDURE — 97530 THERAPEUTIC ACTIVITIES: CPT | Mod: PN,CQ

## 2025-07-22 PROCEDURE — 97112 NEUROMUSCULAR REEDUCATION: CPT | Mod: PN,CQ

## 2025-07-22 RX ORDER — HYDROCODONE BITARTRATE AND ACETAMINOPHEN 10; 325 MG/1; MG/1
1 TABLET ORAL EVERY 6 HOURS PRN
Qty: 28 TABLET | Refills: 0 | Status: SHIPPED | OUTPATIENT
Start: 2025-07-22

## 2025-07-22 NOTE — PROGRESS NOTES
Outpatient Rehab    Physical Therapy Progress Note    Patient Name: Abi Duran  MRN: [id number]  YOB: 1955  Encounter Date: 7/22/2025    Therapy Diagnosis:   Encounter Diagnosis   Name Primary?    Decreased strength of lower extremity Yes     Physician: Rod Orellana,*    Physician Orders: Eval and Treat  Medical Diagnosis: S/P total knee replacement, right  Surgical Diagnosis: Not applicable for this Episode   Surgical Date: Not applicable for this Episode  Days Since Last Surgery: Not applicable for this Episode    Visit # / Visits Authorized:  10 / 20  Insurance Authorization Period: 6/9/2025 to 12/31/2025  Date of Evaluation: 6/12/2025  Plan of Care Certification: 6/12/2025 to 8/8/2025      PT/PTA:     Number of PTA visits since last PT visit:   Time In: 1000   Time Out: 1100  Total Time (in minutes): 60   Total Billable Time (in minutes): 38    FOTO:  Intake Score: 48%  Survey Score 2: Not applicable for this Episode%  Survey Score 3: Not applicable for this Episode%    Precautions:       Subjective   Most of her pain come from continued swelling in her right knee and expresses frustrations about it..  Pain reported as 2/10.      Objective    Arrives lacking 1 degree from TKE;     Quad set: good  SLR: able with no lag  Gait: decreased step length on LLE.  TUG: 10s without AD    Treatment:  Therapeutic Exercise  TE 1: Heel prop x 5 minutes 5 lbs above and below  TE 2: LS HS stretch 3 x 30s  TE 3: NuStep 8 minutes; Level 2  TE 4: Self mob into hip extension x 30; 5s holds  Balance/Neuromuscular Re-Education  NMR 1: Quad set + SLR x 15 total;- cueing for core control.  NMR 2: SL Hip Abd 3 x 10  NMR 3: DL Bridge 3 x 8; 3s holds  NMR 4: Seated HS curls 35# DL; 3 x 10  NMR 6: DL LAQ 10#; 3 x 8; 3s up/down  Therapeutic Activity  TA 1: DL Shuttle 50# 3 x 10  TA 2: SL Shuttle 4 x 8 37#  Walking with education on step length on turf 4 x 25 yards    Time Entry(in minutes): 25 TE; 18 NMR; 10  TA         Assessment & Plan   Assessment:  Abi arrives this date frustrated and emotional about her continued swelling. Provided education and measured her for a thigh high compression stocking to help mitigate her pain. Provided education on continued joint restrictions and strength deficits contributing to her symptoms. Encouraged her to walk in a pool if able for swelling management. She verbalized understanding. Abi will continue to benefit from skilled Physical Therapy to maximize gains as able.        The patient will continue to benefit from skilled outpatient physical therapy in order to address the deficits listed in the problem list on the initial evaluation, provide patient and family education, and maximize the patients level of independence in the home and community environments.     The patient's spiritual, cultural, and educational needs were considered, and the patient is agreeable to the plan of care and goals.           Plan:  Progress as per POC.     Goals:   Active       LTG       Pt will improve TUG to <12s without AD to demonstrate reduction in fall risk (Progressing)       Start:  06/12/25    Expected End:  08/08/25            Pt will improve 5xSTS to <14s without UE support (Progressing)       Start:  06/12/25    Expected End:  08/08/25            Pt will report no pain with ADL's and return to home exercise program  (Progressing)       Start:  06/12/25    Expected End:  08/08/25              Resolved       STG       Pt will report reduction in knee pain by 50% (Met)       Start:  06/12/25    Expected End:  07/10/25    Resolved:  07/15/25         Pt will demonstrate TKE with SLR and no lag to demonstrate improve quad function (Met)       Start:  06/12/25    Expected End:  07/10/25    Resolved:  07/15/25         Pt will improve TUG by 10s without AD (Met)       Start:  06/12/25    Expected End:  07/10/25    Resolved:  07/15/25             Paris Bennett, PTA

## 2025-07-24 ENCOUNTER — CLINICAL SUPPORT (OUTPATIENT)
Dept: REHABILITATION | Facility: HOSPITAL | Age: 70
End: 2025-07-24
Payer: MEDICARE

## 2025-07-24 DIAGNOSIS — R29.898 DECREASED STRENGTH OF LOWER EXTREMITY: Primary | ICD-10-CM

## 2025-07-24 PROCEDURE — 97112 NEUROMUSCULAR REEDUCATION: CPT | Mod: PN

## 2025-07-24 PROCEDURE — 97110 THERAPEUTIC EXERCISES: CPT | Mod: PN

## 2025-07-24 PROCEDURE — 97530 THERAPEUTIC ACTIVITIES: CPT | Mod: PN

## 2025-07-24 NOTE — PROGRESS NOTES
Outpatient Rehab  Physical Therapy    Patient Name: Abi Duran  MRN: [id number]  YOB: 1955  Encounter Date: 7/24/2025    Therapy Diagnosis:   Encounter Diagnosis   Name Primary?    Decreased strength of lower extremity Yes     Physician: Rod Orellana,*    Physician Orders: Eval and Treat  Medical Diagnosis: S/P total knee replacement, right  Surgical Diagnosis: Not applicable for this Episode   Surgical Date: Not applicable for this Episode  Days Since Last Surgery: Not applicable for this Episode    Visit # / Visits Authorized:  11 / 20  Insurance Authorization Period: 6/9/2025 to 12/31/2025  Date of Evaluation: 6/12/2025  Plan of Care Certification: 6/12/2025 to 8/8/2025      PT/PTA:     Number of PTA visits since last PT visit:   Time In: 1000   Time Out: 1053  Total Time (in minutes): 53   Total Billable Time (in minutes): 53    FOTO:  Intake Score: 48%  Survey Score 2: Not applicable for this Episode%  Survey Score 3: Not applicable for this Episode%    Precautions:       Subjective   Sore today.  Pain reported as 1/10.      Objective    Arrives lacking 1 degree from TKE;     Quad set: good  SLR: able with no lag  Gait: decreased step length on LLE.  TUG: 10s without AD    Treatment:  Therapeutic Exercise  TE 1: Heel prop x 5 minutes 5 lbs above and below  TE 3: NuStep 8 minutes; Level 2  Balance/Neuromuscular Re-Education  NMR 1: NMES: Quad set with towel x 5 minutes into Standing SLR x 3 minutes into TKE with BTB x 3 minutes  NMR 2: Seated Hip ABd Precor 65# 3 x 10  NMR 6: DL LAQ 10#; 3 x 8; 3s up/down  Therapeutic Activity  TA 1: 6-inch Step Up with March 3 x 8  TA 2: TKE into Tim Step Over x 30  Walking with education on step length on turf 4 x 25 yards  DL Leg Press 20# 3 x 5     Time Entry(in minutes): 23 TE; 12 NMR; 18 TA       Assessment & Plan   Assessment:  Abi did well today. No setbacks within session. Provided handout for swelling management now that we are  progressive loading more. Advised against ice and elevating more than 20 minutes.        The patient will continue to benefit from skilled outpatient physical therapy in order to address the deficits listed in the problem list on the initial evaluation, provide patient and family education, and maximize the patients level of independence in the home and community environments.     The patient's spiritual, cultural, and educational needs were considered, and the patient is agreeable to the plan of care and goals.           Plan:  Progress as per POC.     Goals:   Active       LTG       Pt will improve TUG to <12s without AD to demonstrate reduction in fall risk (Progressing)       Start:  06/12/25    Expected End:  08/08/25            Pt will improve 5xSTS to <14s without UE support (Progressing)       Start:  06/12/25    Expected End:  08/08/25            Pt will report no pain with ADL's and return to home exercise program  (Progressing)       Start:  06/12/25    Expected End:  08/08/25              Resolved       STG       Pt will report reduction in knee pain by 50% (Met)       Start:  06/12/25    Expected End:  07/10/25    Resolved:  07/15/25         Pt will demonstrate TKE with SLR and no lag to demonstrate improve quad function (Met)       Start:  06/12/25    Expected End:  07/10/25    Resolved:  07/15/25         Pt will improve TUG by 10s without AD (Met)       Start:  06/12/25    Expected End:  07/10/25    Resolved:  07/15/25             Sp Roberts, PT, DPT

## 2025-07-29 ENCOUNTER — CLINICAL SUPPORT (OUTPATIENT)
Dept: REHABILITATION | Facility: HOSPITAL | Age: 70
End: 2025-07-29
Payer: MEDICARE

## 2025-07-29 DIAGNOSIS — R29.898 DECREASED STRENGTH OF LOWER EXTREMITY: Primary | ICD-10-CM

## 2025-07-29 PROCEDURE — 97110 THERAPEUTIC EXERCISES: CPT | Mod: PN

## 2025-07-29 PROCEDURE — 97112 NEUROMUSCULAR REEDUCATION: CPT | Mod: PN

## 2025-07-29 PROCEDURE — 97140 MANUAL THERAPY 1/> REGIONS: CPT | Mod: PN

## 2025-07-29 PROCEDURE — 97530 THERAPEUTIC ACTIVITIES: CPT | Mod: PN

## 2025-07-29 NOTE — PROGRESS NOTES
Outpatient Rehab  Physical Therapy    Patient Name: Abi Duran  MRN: [id number]  YOB: 1955  Encounter Date: 7/29/2025    Therapy Diagnosis:   Encounter Diagnosis   Name Primary?    Decreased strength of lower extremity Yes     Physician: Rod Orellana,*    Physician Orders: Eval and Treat  Medical Diagnosis: S/P total knee replacement, right  Surgical Diagnosis: Not applicable for this Episode   Surgical Date: Not applicable for this Episode  Days Since Last Surgery: Not applicable for this Episode    Visit # / Visits Authorized:  12 / 20  Insurance Authorization Period: 6/9/2025 to 12/31/2025  Date of Evaluation: 6/12/2025  Plan of Care Certification: 6/12/2025 to 8/8/2025      PT/PTA:     Number of PTA visits since last PT visit:   Time In: 1000   Time Out: 1053  Total Time (in minutes): 53   Total Billable Time (in minutes): 53    FOTO:  Intake Score: 48%  Survey Score 2: Not applicable for this Episode%  Survey Score 3: Not applicable for this Episode%    Precautions:       Subjective   Had a good weekend; sore last night..  Pain reported as 0/10.      Objective    Arrives lacking 1 degree from TKE;     Quad set: good  SLR: able with no lag  Gait: decreased step length on LLE.  TUG: 10s without AD    Treatment:  Therapeutic Exercise  TE 1: Heel prop x 5 minutes 5 lbs above and below  TE 3: NuStep 8 minutes; Level 2  Manual Therapy  MT 1: Patella mobs  MT 2: Tibiofemoral A/P; grade 3  Balance/Neuromuscular Re-Education  NMR 1: SLR 3 x 10; TKE BTB 3 x 10; 5s holds  NMR 4: Seated HS curls 35# DL; 3 x 10  NMR 6: DL LAQ 10#; 3 x 8; 3s up/down  Therapeutic Activity  TA 1: Sled Push Fwd<>Bwd 4 x 5 yards  DL Leg Press 30# 3 x 5   Staggered Sit<>Stands LLE forward 3 x 8; holding 5# DB     Time Entry(in minutes): 13 TE; 8 MT; 15 NMR; 17 TA       Assessment & Plan   Assessment:  Abi did well today. She arrived today with slight extensor lag which was restored with MT and light stretching.  Focus remains progressive loading to improve functional performance.        The patient will continue to benefit from skilled outpatient physical therapy in order to address the deficits listed in the problem list on the initial evaluation, provide patient and family education, and maximize the patients level of independence in the home and community environments.     The patient's spiritual, cultural, and educational needs were considered, and the patient is agreeable to the plan of care and goals.           Plan:  Progress as per POC.     Goals:   Active       LTG       Pt will improve TUG to <12s without AD to demonstrate reduction in fall risk (Progressing)       Start:  06/12/25    Expected End:  08/08/25            Pt will improve 5xSTS to <14s without UE support (Progressing)       Start:  06/12/25    Expected End:  08/08/25            Pt will report no pain with ADL's and return to home exercise program  (Progressing)       Start:  06/12/25    Expected End:  08/08/25              Resolved       STG       Pt will report reduction in knee pain by 50% (Met)       Start:  06/12/25    Expected End:  07/10/25    Resolved:  07/15/25         Pt will demonstrate TKE with SLR and no lag to demonstrate improve quad function (Met)       Start:  06/12/25    Expected End:  07/10/25    Resolved:  07/15/25         Pt will improve TUG by 10s without AD (Met)       Start:  06/12/25    Expected End:  07/10/25    Resolved:  07/15/25             Sp Roberts, PT, DPT

## 2025-07-31 ENCOUNTER — TELEPHONE (OUTPATIENT)
Dept: FAMILY MEDICINE | Facility: CLINIC | Age: 70
End: 2025-07-31
Payer: MEDICARE

## 2025-07-31 ENCOUNTER — CLINICAL SUPPORT (OUTPATIENT)
Dept: REHABILITATION | Facility: HOSPITAL | Age: 70
End: 2025-07-31
Payer: MEDICARE

## 2025-07-31 DIAGNOSIS — R29.898 DECREASED STRENGTH OF LOWER EXTREMITY: Primary | ICD-10-CM

## 2025-07-31 PROCEDURE — 97112 NEUROMUSCULAR REEDUCATION: CPT | Mod: PN

## 2025-07-31 PROCEDURE — 97530 THERAPEUTIC ACTIVITIES: CPT | Mod: PN

## 2025-07-31 NOTE — PROGRESS NOTES
"  Outpatient Rehab  Physical Therapy    Patient Name: Abi Duran  MRN: [id number]  YOB: 1955  Encounter Date: 7/31/2025    Therapy Diagnosis:   Encounter Diagnosis   Name Primary?    Decreased strength of lower extremity Yes     Physician: Rod Orellana,*    Physician Orders: Eval and Treat  Medical Diagnosis: S/P total knee replacement, right  Surgical Diagnosis: Not applicable for this Episode   Surgical Date: Not applicable for this Episode  Days Since Last Surgery: Not applicable for this Episode    Visit # / Visits Authorized:  13 / 20  Insurance Authorization Period: 6/9/2025 to 12/31/2025  Date of Evaluation: 6/12/2025  Plan of Care Certification: 6/12/2025 to 8/8/2025      PT/PTA:     Number of PTA visits since last PT visit:   Time In: 1000   Time Out: 1055  Total Time (in minutes): 55   Total Billable Time (in minutes): 55    FOTO:  Intake Score: 48%  Survey Score 2: Not applicable for this Episode%  Survey Score 3: Not applicable for this Episode%    Precautions:       Subjective    Arrived today reporting "pain-free walking"         Objective    Arrives lacking 1 degree from TKE;     Quad set: good  SLR: able with no lag  Gait: decreased step length on LLE.  TUG: 10s without AD    Treatment:  TE:   NuStep 10 minutes; Level 3    SLR 1 x 8 - stopped due to fatigue    SL Hip Abd 3 x 8  Education: load management   NMR:    Hip Ext Shuttle 12# 3 x 10   Seated LAQ 10# DL machine 4 x 8  Seated Hip Abd machine 65# 3 x 10  TA:    DL Leg Press Machine 20# 4 x 5    Sled Push  Fwd<>Bwd 3 x 5 yards    Time Entry(in minutes): 24 TE; 16 NMR; 14 TA       Assessment & Plan   Assessment:  Abi did well today. She got emotional today and frustrated with lack of SLR strength which she had earlier this week. I informed her that due to lack of current strength and fatigue, she will not have some exercise capacity day to day at this stage. She verbalized understanding. She has trended toward 1 " visit of high loading and her 2nd visit to low-mod intensity training with more rest breaks.        The patient will continue to benefit from skilled outpatient physical therapy in order to address the deficits listed in the problem list on the initial evaluation, provide patient and family education, and maximize the patients level of independence in the home and community environments.     The patient's spiritual, cultural, and educational needs were considered, and the patient is agreeable to the plan of care and goals.           Plan:  Progress as per POC.     Goals:   Active       LTG       Pt will improve TUG to <12s without AD to demonstrate reduction in fall risk (Progressing)       Start:  06/12/25    Expected End:  08/08/25            Pt will improve 5xSTS to <14s without UE support (Progressing)       Start:  06/12/25    Expected End:  08/08/25            Pt will report no pain with ADL's and return to home exercise program  (Progressing)       Start:  06/12/25    Expected End:  08/08/25              Resolved       STG       Pt will report reduction in knee pain by 50% (Met)       Start:  06/12/25    Expected End:  07/10/25    Resolved:  07/15/25         Pt will demonstrate TKE with SLR and no lag to demonstrate improve quad function (Met)       Start:  06/12/25    Expected End:  07/10/25    Resolved:  07/15/25         Pt will improve TUG by 10s without AD (Met)       Start:  06/12/25    Expected End:  07/10/25    Resolved:  07/15/25             Sp Roberts, PT, DPT

## 2025-07-31 NOTE — TELEPHONE ENCOUNTER
Spoke with pt and she stated that she isnt scheduling an annual visit at this time. She has enough to deal with.

## 2025-08-05 ENCOUNTER — CLINICAL SUPPORT (OUTPATIENT)
Dept: REHABILITATION | Facility: HOSPITAL | Age: 70
End: 2025-08-05
Payer: MEDICARE

## 2025-08-05 DIAGNOSIS — R29.898 DECREASED STRENGTH OF LOWER EXTREMITY: Primary | ICD-10-CM

## 2025-08-05 PROCEDURE — 97530 THERAPEUTIC ACTIVITIES: CPT | Mod: PN

## 2025-08-05 PROCEDURE — 97112 NEUROMUSCULAR REEDUCATION: CPT | Mod: PN

## 2025-08-05 NOTE — PROGRESS NOTES
Outpatient Rehab    Physical Therapy Visit    Patient Name: Abi Duran  MRN: [id number]  YOB: 1955  Encounter Date: 8/5/2025    Therapy Diagnosis:   Encounter Diagnosis   Name Primary?    Decreased strength of lower extremity Yes     Physician: Rod Orellana,*    Physician Orders: Eval and Treat  Medical Diagnosis: S/P total knee replacement, right  Surgical Diagnosis: Not applicable for this Episode   Surgical Date: Not applicable for this Episode  Days Since Last Surgery: Not applicable for this Episode    Visit # / Visits Authorized:  14 / 20  Insurance Authorization Period: 6/9/2025 to 12/31/2025  Date of Evaluation: 6/12/2025  Plan of Care Certification: 6/12/2025 to 8/8/2025      PT/PTA:     Number of PTA visits since last PT visit:   Time In: 1000   Time Out: 1056  Total Time (in minutes): 56   Total Billable Time (in minutes): 24    FOTO:  Intake Score (%): 48  Survey Score 2 (%): Not applicable for this Episode  Survey Score 3 (%): Not applicable for this Episode    Precautions:         Subjective   She has been pushing it. last week she hard a hard time doing a straight leg raise..  Pain reported as 0/10.      Objective            Treatment:  Therapeutic Exercise  TE 1: Heel prop x 5 minutes 5 lbs above and below  TE 2: LAQ with 10#, 4 x 8  TE 3: NuStep 8 minutes; Level 2  Balance/Neuromuscular Re-Education  NMR 1: Modified straight leg raise, 3 x 10  NMR 2: Supine SLR, 3 x 10  NMR 3: Side lying hip abduction, 2 x10  Therapeutic Activity  TA 1: Sled Push Fwd<>Bwd 4 x 5 yards, 20#  TA 2: DL Leg Press Machine 20# 4 x 5    Time Entry(in minutes):  Neuromuscular Re-Education Time Entry: 23  Therapeutic Activity Time Entry: 12  Therapeutic Exercise Time Entry: 21    Assessment & Plan   Assessment: Abi completed therapy with no complications. Treatment remained consistent in developing her quadriceps strength and terminal knee extension. She demonstrates a quad lag, albeit, it's  minimal. She lacks quadriceps endurance, limiting the load that's able to be applied to her leg. Treatment will continue to load heavy one day and focus on endurance the following to avoid overloading her and limiting the work that can be done in the following session.   Evaluation/Treatment Tolerance: Patient tolerated treatment well    The patient will continue to benefit from skilled outpatient physical therapy in order to address the deficits listed in the problem list on the initial evaluation, provide patient and family education, and maximize the patients level of independence in the home and community environments.     The patient's spiritual, cultural, and educational needs were considered, and the patient is agreeable to the plan of care and goals.           Plan: Continue per established POC.    Goals:   Active       LTG       Pt will improve TUG to <12s without AD to demonstrate reduction in fall risk (Progressing)       Start:  06/12/25    Expected End:  08/08/25            Pt will improve 5xSTS to <14s without UE support (Progressing)       Start:  06/12/25    Expected End:  08/08/25            Pt will report no pain with ADL's and return to home exercise program  (Progressing)       Start:  06/12/25    Expected End:  08/08/25              Resolved       STG       Pt will report reduction in knee pain by 50% (Met)       Start:  06/12/25    Expected End:  07/10/25    Resolved:  07/15/25         Pt will demonstrate TKE with SLR and no lag to demonstrate improve quad function (Met)       Start:  06/12/25    Expected End:  07/10/25    Resolved:  07/15/25         Pt will improve TUG by 10s without AD (Met)       Start:  06/12/25    Expected End:  07/10/25    Resolved:  07/15/25             Efrain Sherman, PT, DPT  Board Certified Orthopaedic Clinical Specialist

## 2025-08-07 ENCOUNTER — TELEPHONE (OUTPATIENT)
Dept: NEUROLOGY | Facility: CLINIC | Age: 70
End: 2025-08-07
Payer: MEDICARE

## 2025-08-07 ENCOUNTER — OFFICE VISIT (OUTPATIENT)
Dept: ORTHOPEDICS | Facility: CLINIC | Age: 70
End: 2025-08-07
Payer: MEDICARE

## 2025-08-07 VITALS — RESPIRATION RATE: 16 BRPM

## 2025-08-07 DIAGNOSIS — I89.0 LYMPHEDEMA OF LOWER EXTREMITY: ICD-10-CM

## 2025-08-07 DIAGNOSIS — G90.521 COMPLEX REGIONAL PAIN SYNDROME TYPE 1 OF RIGHT LOWER EXTREMITY: ICD-10-CM

## 2025-08-07 DIAGNOSIS — M79.2 NEURALGIA AND NEURITIS: ICD-10-CM

## 2025-08-07 DIAGNOSIS — M25.661 KNEE STIFFNESS, RIGHT: Primary | ICD-10-CM

## 2025-08-07 DIAGNOSIS — Z96.651 S/P TOTAL KNEE REPLACEMENT, RIGHT: Primary | ICD-10-CM

## 2025-08-07 DIAGNOSIS — M25.661 KNEE STIFFNESS, RIGHT: ICD-10-CM

## 2025-08-07 PROCEDURE — 99212 OFFICE O/P EST SF 10 MIN: CPT | Mod: PBBFAC,PO | Performed by: ORTHOPAEDIC SURGERY

## 2025-08-07 PROCEDURE — 99999 PR PBB SHADOW E&M-EST. PATIENT-LVL II: CPT | Mod: PBBFAC,,, | Performed by: ORTHOPAEDIC SURGERY

## 2025-08-07 PROCEDURE — 99214 OFFICE O/P EST MOD 30 MIN: CPT | Mod: S$PBB,,, | Performed by: ORTHOPAEDIC SURGERY

## 2025-08-07 NOTE — TELEPHONE ENCOUNTER
Spoke with pt and inquired if she wanted to forward her referral to a pain management doctpr here. She stated no she is not willing to see pain management and feels all they would do is put her on different medications and narcotics and she is unwilling to take these. She also stated she is unwilling to complete EMG testing. Pt stated she is not interested in making appt with clinic at this time and is scheduled to see referring physician in 2 months.   
81.6

## 2025-08-07 NOTE — PROGRESS NOTES
Past Medical History:   Diagnosis Date    Allergy     Arthritis     Coronary artery disease     GERD (gastroesophageal reflux disease)     Hypertension     Ulcer        Past Surgical History:   Procedure Laterality Date    CARDIAC SURGERY      angiogram    CHOLECYSTECTOMY      ESOPHAGOGASTRODUODENOSCOPY N/A 11/11/2024    Procedure: EGD (ESOPHAGOGASTRODUODENOSCOPY);  Surgeon: Patricio Garcia DO;  Location: [facility];  Service: Endoscopy;  Laterality: N/A;    EYE SURGERY      lasik    HYSTERECTOMY      KNEE JOINT MANIPULATION Right 9/10/2024    Procedure: MANIPULATION, KNEE;  Surgeon: Rod Orellana MD;  Location: [facility] OR;  Service: Orthopedics;  Laterality: Right;    KNEE SURGERY      scope    ROBOTIC ARTHROPLASTY, KNEE Right 6/25/2024    Procedure: ROBOTIC ARTHROPLASTY, KNEE, TOTAL;  Surgeon: Rod Orellana MD;  Location: [facility];  Service: Orthopedics;  Laterality: Right;  NO SPINAL !!// cruzito    TONSILLECTOMY, ADENOIDECTOMY, BILATERAL MYRINGOTOMY AND TUBES      TUBAL LIGATION         Current Outpatient Medications   Medication Sig    BORON ORAL Take by mouth.    HYDROcodone-acetaminophen (NORCO)  mg per tablet Take 1 tablet by mouth every 6 (six) hours as needed for Pain.    ondansetron (ZOFRAN) 4 MG tablet Take 1 tablet (4 mg total) by mouth every 6 (six) hours as needed for Nausea.    UNABLE TO FIND Take 1 capsule by mouth Daily. medication name:NMN supplement     No current facility-administered medications for this visit.       Review of patient's allergies indicates:   Allergen Reactions    Amoxicillin Nausea Only     Cannot tolerate side effects    Doxycycline Nausea Only    Erythromycin Nausea Only     Other reaction(s): Nausea    Imitrex [sumatriptan succinate] Swelling and Palpitations     Other reaction(s): swelling  Other reaction(s): chest pains    Phenergan [promethazine] Other (See Comments)     Other reaction(s): Muscle pain  Muscle pain    Sulfa (sulfonamide  antibiotics) Nausea And Vomiting       Family History   Problem Relation Name Age of Onset    Heart failure Mother      Stroke Mother      Macular degeneration Mother      Hypertension Mother      Heart attack Father      Emphysema Father      Lung cancer Father      Heart attack Sister      Breast cancer Maternal Grandmother      Heart attack Maternal Grandmother      Blindness Maternal Grandmother      Cataracts Maternal Grandmother      Breast cancer Paternal Grandmother      Heart attack Paternal Grandmother      Blindness Paternal Grandmother      Cataracts Paternal Grandmother      Diabetes Paternal Grandmother      Amblyopia Neg Hx      Cancer Neg Hx      Glaucoma Neg Hx      Retinal detachment Neg Hx      Strabismus Neg Hx      Thyroid disease Neg Hx         Social History     Socioeconomic History    Marital status:    Tobacco Use    Smoking status: Never    Smokeless tobacco: Never   Substance and Sexual Activity    Alcohol use: No    Drug use: No    Sexual activity: Yes     Partners: Male     Social Drivers of Health     Financial Resource Strain: Low Risk  (11/9/2024)    Overall Financial Resource Strain (CARDIA)     Difficulty of Paying Living Expenses: Not hard at all   Food Insecurity: No Food Insecurity (11/9/2024)    Hunger Vital Sign     Worried About Running Out of Food in the Last Year: Never true     Ran Out of Food in the Last Year: Never true   Transportation Needs: No Transportation Needs (11/9/2024)    TRANSPORTATION NEEDS     Transportation : No   Physical Activity: Sufficiently Active (9/11/2024)    Exercise Vital Sign     Days of Exercise per Week: 3 days     Minutes of Exercise per Session: 60 min   Stress: No Stress Concern Present (11/9/2024)    [employer] [place] [employer] - Occupational Stress Questionnaire     Feeling of Stress : Not at all   Housing Stability: Unknown (11/9/2024)    Housing Stability Vital Sign     Unable to Pay for Housing in the Last Year: No      Homeless in the Last Year: No       Chief Complaint:   No chief complaint on file.      Date of surgery:  June 25, 2024 right total knee   September 10, 2024 right knee manipulation    History of present illness:  70-year-old female underwent right robotic assisted total knee arthroplasty.  Patient had significant issues postoperatively with pain and arthrofibrosis.  She is able to get up to 116° in physical therapy with 105 actively.  She is also off the walker and just using a cane.  We tried some BF are to help with her quad atrophy but she was unable to tolerate that.  Unable to tolerate compression stockings as well.  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    Review of Systems:  Musculoskeletal:  See HPI        Physical Examination:    Vital Signs:    There were no vitals filed for this visit.      There is no height or weight on file to calculate BMI.    This a well-developed, well nourished patient in no acute distress.  They are alert and oriented and cooperative to examination.  Pt. walks with an improved gait with a cane     Examination of the right knee shows well-healed surgical incision.  No erythema drainage.   Still has a little swelling in the leg and knee.  Range of motion is about 3° to 115°.  Lot of tenderness over the knee kind of globally      X-rays:  Four views of both knees are review which show  right total knee arthroplasty with some patellar tilt     Assessment::  Status post right robotic assisted total knee arthroplasty  Right CRPS    Plan:  I reviewed the findings with her today.  Recommended just a knee sleeve.  We will try some lymphedema therapy.  Hopefully this can help with her lower extremity swelling.  We will also make some inquiries into a neurologist who might specialized in CRPS or neuralgias/neuropathy.  Follow up in 2 months.      This note was created using [employer] voice recognition software that  occasionally misinterpreted phrases or words.

## 2025-08-18 ENCOUNTER — CLINICAL SUPPORT (OUTPATIENT)
Dept: REHABILITATION | Facility: HOSPITAL | Age: 70
End: 2025-08-18
Attending: ORTHOPAEDIC SURGERY
Payer: MEDICARE

## 2025-08-18 DIAGNOSIS — I89.0 LYMPHEDEMA OF RIGHT LOWER EXTREMITY: Primary | ICD-10-CM

## 2025-08-18 PROCEDURE — 97530 THERAPEUTIC ACTIVITIES: CPT | Mod: PO

## 2025-08-18 PROCEDURE — 97167 OT EVAL HIGH COMPLEX 60 MIN: CPT | Mod: PO

## 2025-08-20 ENCOUNTER — PATIENT MESSAGE (OUTPATIENT)
Dept: ADMINISTRATIVE | Facility: HOSPITAL | Age: 70
End: 2025-08-20
Payer: MEDICARE

## 2025-08-20 ENCOUNTER — PATIENT MESSAGE (OUTPATIENT)
Dept: ORTHOPEDICS | Facility: CLINIC | Age: 70
End: 2025-08-20
Payer: MEDICARE

## 2025-08-20 DIAGNOSIS — Z96.651 S/P TOTAL KNEE REPLACEMENT, RIGHT: ICD-10-CM

## 2025-08-20 DIAGNOSIS — M25.661 KNEE STIFFNESS, RIGHT: ICD-10-CM

## 2025-08-20 PROBLEM — I89.0 LYMPHEDEMA OF RIGHT LOWER EXTREMITY: Status: ACTIVE | Noted: 2025-08-20

## 2025-08-20 RX ORDER — ONDANSETRON 4 MG/1
4 TABLET, FILM COATED ORAL EVERY 6 HOURS PRN
Qty: 30 TABLET | Refills: 0 | Status: SHIPPED | OUTPATIENT
Start: 2025-08-20

## 2025-08-20 RX ORDER — HYDROCODONE BITARTRATE AND ACETAMINOPHEN 10; 325 MG/1; MG/1
1 TABLET ORAL EVERY 6 HOURS PRN
Qty: 28 TABLET | Refills: 0 | Status: SHIPPED | OUTPATIENT
Start: 2025-08-20

## 2025-08-26 ENCOUNTER — CLINICAL SUPPORT (OUTPATIENT)
Dept: REHABILITATION | Facility: HOSPITAL | Age: 70
End: 2025-08-26
Payer: MEDICARE

## 2025-08-26 DIAGNOSIS — I89.0 LYMPHEDEMA OF RIGHT LOWER EXTREMITY: Primary | ICD-10-CM

## 2025-08-26 PROCEDURE — 97530 THERAPEUTIC ACTIVITIES: CPT | Mod: PO

## 2025-08-26 PROCEDURE — 97140 MANUAL THERAPY 1/> REGIONS: CPT | Mod: PO

## 2025-08-27 ENCOUNTER — TELEPHONE (OUTPATIENT)
Dept: ORTHOPEDICS | Facility: CLINIC | Age: 70
End: 2025-08-27
Payer: MEDICARE

## 2025-08-27 DIAGNOSIS — I89.0 LYMPHEDEMA OF LOWER EXTREMITY: Primary | ICD-10-CM

## (undated) DEVICE — PADDING CAST SPECIALIST 6X4YD

## (undated) DEVICE — SUT 0 36IN COATED VICRYL VI

## (undated) DEVICE — DRESSING MEPILEX 4X10IN

## (undated) DEVICE — BLADE SURG CARBON STEEL SZ11

## (undated) DEVICE — DRAPE STERI U-SHAPED 47X51IN

## (undated) DEVICE — PACK CUSTOM UNIV BASIN SLI

## (undated) DEVICE — WRAP DEMAYO LEG STERILE

## (undated) DEVICE — BLADE SAGITTAL 18 X 1.27 X 90M

## (undated) DEVICE — SOL NACL IRR 3000ML

## (undated) DEVICE — TOURNIQUET SB QC DP 34X4IN

## (undated) DEVICE — YANKAUER FLEX NO VENT HI CAP

## (undated) DEVICE — ADHESIVE DERMABOND ADVANCED

## (undated) DEVICE — KIT TRIATHLON TIBIAL SIZER

## (undated) DEVICE — SUT STRATAFIX PDS 1 CTX 18IN

## (undated) DEVICE — MANIFOLD 4 PORT

## (undated) DEVICE — SLEEVE SCD EXPRESS KNEE MEDIUM

## (undated) DEVICE — TOWEL OR DISP STRL BLUE 4/PK

## (undated) DEVICE — KIT TRIATHLON CR TIB PREP SZ3

## (undated) DEVICE — SOL NACL IRR 1000ML BTL

## (undated) DEVICE — BANDAGE ESMARK ELASTIC ST 6X9

## (undated) DEVICE — NDL SPINAL 18GX3.5 SPINOCAN

## (undated) DEVICE — DECANTER FLUID TRNSF WHITE 9IN

## (undated) DEVICE — ELECTRODE REM PLYHSV RETURN 9

## (undated) DEVICE — GLOVE SENSICARE PI GRN 8

## (undated) DEVICE — SOL POVIDONE PREP IODINE 4 OZ

## (undated) DEVICE — GOWN TOGA SYS PEELWY ZIP 2 XL

## (undated) DEVICE — DRAPE THREE-QTR REINF 53X77IN

## (undated) DEVICE — COVER TABLE 44X90 STERILE

## (undated) DEVICE — BONE PINS (3.2MM X 110MM)
Type: IMPLANTABLE DEVICE | Site: KNEE | Status: NON-FUNCTIONAL
Removed: 2024-06-25

## (undated) DEVICE — BONE PINS (4MM X 140MM)
Type: IMPLANTABLE DEVICE | Site: KNEE | Status: NON-FUNCTIONAL
Removed: 2024-06-25

## (undated) DEVICE — BLADE TONGUE DEPRESSOR STRL

## (undated) DEVICE — DRAPE ORTH SPLIT 77X108IN

## (undated) DEVICE — SUT STRATAFIX SPRL PS-2 3-0

## (undated) DEVICE — PACK SIRUS BASIC V SURG STRL

## (undated) DEVICE — STRAP OR TABLE 5IN X 72IN

## (undated) DEVICE — SPONGE BULKEE II ABSRB 6X6.75

## (undated) DEVICE — PADDING WYTEX UNDRCST 6INX4YD

## (undated) DEVICE — SYR 50CC LL

## (undated) DEVICE — APPLICATOR CHLORAPREP ORN 26ML

## (undated) DEVICE — DRAPE INCISE IOBAN 2 23X17IN

## (undated) DEVICE — BLADE SURG CARBON STEEL #10

## (undated) DEVICE — SUT 2/0 18IN COATED VICRYL

## (undated) DEVICE — CATH URETHRAL RED RUBBER 18FR

## (undated) DEVICE — PAD ABDOMINAL STERILE 8X10IN

## (undated) DEVICE — LINER SUCTION 3000CC

## (undated) DEVICE — INTERPULSE SET

## (undated) DEVICE — DRESSING GAUZE OIL EMUL 3X8

## (undated) DEVICE — BANDAGE MATRIX HK LOOP 6IN 5YD

## (undated) DEVICE — WRAP KNEE ACCU THERM GEL PACK

## (undated) DEVICE — KIT DRAPE RIO ONE PIECE W/POCK

## (undated) DEVICE — KIT CHECKPOINT MAKO

## (undated) DEVICE — PACK EXTREMITY ORTHOMAX

## (undated) DEVICE — KIT VIZADISC KNEE TRACKING

## (undated) DEVICE — TOGA FLYTE PEEL AWAY XLARGE

## (undated) DEVICE — BNDG COFLEX FOAM LF2 ST 6X5YD

## (undated) DEVICE — DRAPE STERI INSTRUMENT 1018

## (undated) DEVICE — BLADE MAKO NARROW

## (undated) DEVICE — ALCOHOL 70% ANTISEPTIC ISO 4OZ

## (undated) DEVICE — KIT TRIATHLON CR FEM PREP SZ3

## (undated) DEVICE — GLOVE SENSICARE PI ALOE 7.5